# Patient Record
Sex: MALE | Race: BLACK OR AFRICAN AMERICAN | Employment: UNEMPLOYED | URBAN - METROPOLITAN AREA
[De-identification: names, ages, dates, MRNs, and addresses within clinical notes are randomized per-mention and may not be internally consistent; named-entity substitution may affect disease eponyms.]

---

## 2021-04-20 ENCOUNTER — TRANSCRIBE ORDER (OUTPATIENT)
Dept: SCHEDULING | Age: 56
End: 2021-04-20

## 2021-04-20 DIAGNOSIS — M21.379 FOOT DROP: Primary | ICD-10-CM

## 2021-05-06 ENCOUNTER — HOSPITAL ENCOUNTER (OUTPATIENT)
Dept: CT IMAGING | Age: 56
Discharge: HOME | End: 2021-05-06
Payer: MEDICARE

## 2021-05-06 DIAGNOSIS — M21.379 FOOT DROP: ICD-10-CM

## 2021-05-06 PROCEDURE — 72131 CT LUMBAR SPINE W/O DYE: CPT

## 2021-07-12 ENCOUNTER — TRANSCRIBE ORDER (OUTPATIENT)
Dept: SCHEDULING | Age: 56
End: 2021-07-12

## 2021-07-12 DIAGNOSIS — M21.372 LEFT FOOT DROP: Primary | ICD-10-CM

## 2021-07-28 ENCOUNTER — HOSPITAL ENCOUNTER (OUTPATIENT)
Dept: MRI IMAGING | Age: 56
Discharge: HOME | End: 2021-07-28
Attending: PHYSICAL MEDICINE & REHABILITATION
Payer: MEDICARE

## 2021-07-28 DIAGNOSIS — M21.372 LEFT FOOT DROP: ICD-10-CM

## 2021-07-28 PROCEDURE — 72148 MRI LUMBAR SPINE W/O DYE: CPT

## 2022-04-20 ENCOUNTER — TRANSCRIBE ORDER (OUTPATIENT)
Dept: SCHEDULING | Age: 57
End: 2022-04-20

## 2022-04-20 DIAGNOSIS — N18.30 CHRONIC KIDNEY DISEASE, STAGE III (MODERATE) (HCC): Primary | ICD-10-CM

## 2022-04-27 ENCOUNTER — HOSPITAL ENCOUNTER (OUTPATIENT)
Dept: ULTRASOUND IMAGING | Age: 57
Discharge: HOME | End: 2022-04-27
Payer: MEDICARE

## 2022-04-27 DIAGNOSIS — N18.30 CHRONIC KIDNEY DISEASE, STAGE III (MODERATE) (HCC): ICD-10-CM

## 2022-04-27 PROCEDURE — 76770 US EXAM ABDO BACK WALL COMP: CPT

## 2024-05-28 ENCOUNTER — HOSPITAL ENCOUNTER (OUTPATIENT)
Facility: HOSPITAL | Age: 59
Discharge: HOME | End: 2024-05-30
Payer: MEDICARE

## 2024-05-28 DIAGNOSIS — R40.20 COMA, UNSPECIFIED COMA DEPTH (HCC): ICD-10-CM

## 2024-05-28 LAB
VAS LEFT CCA DIST EDV: 14.1 CM/S
VAS LEFT CCA DIST PSV: 57.8 CM/S
VAS LEFT CCA PROX EDV: 23.3 CM/S
VAS LEFT CCA PROX PSV: 106.2 CM/S
VAS LEFT ECA EDV: 7.2 CM/S
VAS LEFT ECA PSV: 89.3 CM/S
VAS LEFT ICA DIST EDV: 36.1 CM/S
VAS LEFT ICA DIST PSV: 97.8 CM/S
VAS LEFT ICA MID EDV: 29 CM/S
VAS LEFT ICA MID PSV: 73.8 CM/S
VAS LEFT ICA PROX EDV: 21.7 CM/S
VAS LEFT ICA PROX PSV: 69 CM/S
VAS LEFT ICA/CCA PSV: 1.7 NO UNITS
VAS LEFT VERTEBRAL EDV: 8.7 CM/S
VAS LEFT VERTEBRAL PSV: 42.8 CM/S
VAS RIGHT CCA DIST EDV: 15.2 CM/S
VAS RIGHT CCA DIST PSV: 68.9 CM/S
VAS RIGHT CCA PROX EDV: 12.7 CM/S
VAS RIGHT CCA PROX PSV: 78.3 CM/S
VAS RIGHT ECA EDV: 0 CM/S
VAS RIGHT ECA PSV: 85.6 CM/S
VAS RIGHT ICA DIST EDV: 34.3 CM/S
VAS RIGHT ICA DIST PSV: 101.4 CM/S
VAS RIGHT ICA MID EDV: 32.5 CM/S
VAS RIGHT ICA MID PSV: 90.5 CM/S
VAS RIGHT ICA PROX EDV: 23.5 CM/S
VAS RIGHT ICA PROX PSV: 89.3 CM/S
VAS RIGHT ICA/CCA PSV: 1.5 NO UNITS
VAS RIGHT VERTEBRAL EDV: 19.8 CM/S
VAS RIGHT VERTEBRAL PSV: 65.1 CM/S

## 2024-05-28 PROCEDURE — 93880 EXTRACRANIAL BILAT STUDY: CPT

## 2024-05-28 PROCEDURE — 93880 EXTRACRANIAL BILAT STUDY: CPT | Performed by: INTERNAL MEDICINE

## 2024-06-25 ENCOUNTER — HOSPITAL ENCOUNTER (INPATIENT)
Facility: HOSPITAL | Age: 59
LOS: 9 days | Discharge: NURSING, REHAB OR LONG-TERM CARE | End: 2024-07-05
Attending: EMERGENCY MEDICINE | Admitting: STUDENT IN AN ORGANIZED HEALTH CARE EDUCATION/TRAINING PROGRAM
Payer: MEDICARE

## 2024-06-25 DIAGNOSIS — R26.89 IMBALANCE: ICD-10-CM

## 2024-06-25 DIAGNOSIS — N18.6 ESRD (END STAGE RENAL DISEASE) (HCC): ICD-10-CM

## 2024-06-25 DIAGNOSIS — R29.898 WEAKNESS OF BOTH LOWER EXTREMITIES: ICD-10-CM

## 2024-06-25 DIAGNOSIS — I63.9 CEREBROVASCULAR ACCIDENT (CVA), UNSPECIFIED MECHANISM (HCC): Primary | ICD-10-CM

## 2024-06-25 DIAGNOSIS — G62.9 PERIPHERAL POLYNEUROPATHY: ICD-10-CM

## 2024-06-25 LAB
ALBUMIN SERPL-MCNC: 2.8 G/DL (ref 3.5–5)
ALBUMIN/GLOB SERPL: 0.7 (ref 1.1–2.2)
ALP SERPL-CCNC: 81 U/L (ref 45–117)
ALT SERPL-CCNC: 11 U/L (ref 12–78)
ANION GAP SERPL CALC-SCNC: 5 MMOL/L (ref 5–15)
AST SERPL-CCNC: 13 U/L (ref 15–37)
BASOPHILS # BLD: 0 K/UL (ref 0–0.1)
BASOPHILS NFR BLD: 1 % (ref 0–1)
BILIRUB SERPL-MCNC: 0.8 MG/DL (ref 0.2–1)
BUN SERPL-MCNC: 57 MG/DL (ref 6–20)
BUN/CREAT SERPL: 6 (ref 12–20)
CALCIUM SERPL-MCNC: 8.8 MG/DL (ref 8.5–10.1)
CHLORIDE SERPL-SCNC: 107 MMOL/L (ref 97–108)
CO2 SERPL-SCNC: 25 MMOL/L (ref 21–32)
CREAT SERPL-MCNC: 9.26 MG/DL (ref 0.7–1.3)
DIFFERENTIAL METHOD BLD: ABNORMAL
EOSINOPHIL # BLD: 0.2 K/UL (ref 0–0.4)
EOSINOPHIL NFR BLD: 2 % (ref 0–7)
ERYTHROCYTE [DISTWIDTH] IN BLOOD BY AUTOMATED COUNT: 12.6 % (ref 11.5–14.5)
GLOBULIN SER CALC-MCNC: 4 G/DL (ref 2–4)
GLUCOSE SERPL-MCNC: 154 MG/DL (ref 65–100)
HCT VFR BLD AUTO: 30.3 % (ref 36.6–50.3)
HGB BLD-MCNC: 10 G/DL (ref 12.1–17)
IMM GRANULOCYTES # BLD AUTO: 0 K/UL (ref 0–0.04)
IMM GRANULOCYTES NFR BLD AUTO: 0 % (ref 0–0.5)
LYMPHOCYTES # BLD: 1.5 K/UL (ref 0.8–3.5)
LYMPHOCYTES NFR BLD: 21 % (ref 12–49)
MCH RBC QN AUTO: 28.5 PG (ref 26–34)
MCHC RBC AUTO-ENTMCNC: 33 G/DL (ref 30–36.5)
MCV RBC AUTO: 86.3 FL (ref 80–99)
MONOCYTES # BLD: 0.5 K/UL (ref 0–1)
MONOCYTES NFR BLD: 7 % (ref 5–13)
NEUTS SEG # BLD: 4.9 K/UL (ref 1.8–8)
NEUTS SEG NFR BLD: 69 % (ref 32–75)
NRBC # BLD: 0 K/UL (ref 0–0.01)
NRBC BLD-RTO: 0 PER 100 WBC
PLATELET # BLD AUTO: 257 K/UL (ref 150–400)
PMV BLD AUTO: 9.9 FL (ref 8.9–12.9)
POTASSIUM SERPL-SCNC: 4.1 MMOL/L (ref 3.5–5.1)
PROT SERPL-MCNC: 6.8 G/DL (ref 6.4–8.2)
RBC # BLD AUTO: 3.51 M/UL (ref 4.1–5.7)
SODIUM SERPL-SCNC: 137 MMOL/L (ref 136–145)
WBC # BLD AUTO: 7.1 K/UL (ref 4.1–11.1)

## 2024-06-25 PROCEDURE — 99285 EMERGENCY DEPT VISIT HI MDM: CPT

## 2024-06-25 PROCEDURE — 93005 ELECTROCARDIOGRAM TRACING: CPT | Performed by: STUDENT IN AN ORGANIZED HEALTH CARE EDUCATION/TRAINING PROGRAM

## 2024-06-25 PROCEDURE — 80053 COMPREHEN METABOLIC PANEL: CPT

## 2024-06-25 PROCEDURE — 36415 COLL VENOUS BLD VENIPUNCTURE: CPT

## 2024-06-25 PROCEDURE — 85025 COMPLETE CBC W/AUTO DIFF WBC: CPT

## 2024-06-25 ASSESSMENT — LIFESTYLE VARIABLES
HOW OFTEN DO YOU HAVE A DRINK CONTAINING ALCOHOL: NEVER
HOW MANY STANDARD DRINKS CONTAINING ALCOHOL DO YOU HAVE ON A TYPICAL DAY: PATIENT DOES NOT DRINK

## 2024-06-26 ENCOUNTER — APPOINTMENT (OUTPATIENT)
Facility: HOSPITAL | Age: 59
End: 2024-06-26
Payer: MEDICARE

## 2024-06-26 PROBLEM — N18.9 CKD (CHRONIC KIDNEY DISEASE): Status: ACTIVE | Noted: 2024-06-26

## 2024-06-26 LAB
CK SERPL-CCNC: 96 U/L (ref 39–308)
EKG ATRIAL RATE: 80 BPM
EKG DIAGNOSIS: NORMAL
EKG P AXIS: 68 DEGREES
EKG P-R INTERVAL: 148 MS
EKG Q-T INTERVAL: 380 MS
EKG QRS DURATION: 86 MS
EKG QTC CALCULATION (BAZETT): 438 MS
EKG R AXIS: -15 DEGREES
EKG T AXIS: 85 DEGREES
EKG VENTRICULAR RATE: 80 BPM
GLUCOSE BLD STRIP.AUTO-MCNC: 152 MG/DL (ref 65–117)
GLUCOSE BLD STRIP.AUTO-MCNC: 160 MG/DL (ref 65–117)
GLUCOSE BLD STRIP.AUTO-MCNC: 182 MG/DL (ref 65–117)
MAGNESIUM SERPL-MCNC: 2.2 MG/DL (ref 1.6–2.4)
SERVICE CMNT-IMP: ABNORMAL
URATE SERPL-MCNC: 7.9 MG/DL (ref 3.5–7.2)

## 2024-06-26 PROCEDURE — 6360000002 HC RX W HCPCS: Performed by: STUDENT IN AN ORGANIZED HEALTH CARE EDUCATION/TRAINING PROGRAM

## 2024-06-26 PROCEDURE — 82962 GLUCOSE BLOOD TEST: CPT

## 2024-06-26 PROCEDURE — 76770 US EXAM ABDO BACK WALL COMP: CPT

## 2024-06-26 PROCEDURE — 97530 THERAPEUTIC ACTIVITIES: CPT

## 2024-06-26 PROCEDURE — 83735 ASSAY OF MAGNESIUM: CPT

## 2024-06-26 PROCEDURE — 84550 ASSAY OF BLOOD/URIC ACID: CPT

## 2024-06-26 PROCEDURE — 6360000002 HC RX W HCPCS: Performed by: INTERNAL MEDICINE

## 2024-06-26 PROCEDURE — 2580000003 HC RX 258: Performed by: STUDENT IN AN ORGANIZED HEALTH CARE EDUCATION/TRAINING PROGRAM

## 2024-06-26 PROCEDURE — 1100000003 HC PRIVATE W/ TELEMETRY

## 2024-06-26 PROCEDURE — 36415 COLL VENOUS BLD VENIPUNCTURE: CPT

## 2024-06-26 PROCEDURE — 97161 PT EVAL LOW COMPLEX 20 MIN: CPT

## 2024-06-26 PROCEDURE — 82550 ASSAY OF CK (CPK): CPT

## 2024-06-26 PROCEDURE — 2580000003 HC RX 258: Performed by: INTERNAL MEDICINE

## 2024-06-26 RX ORDER — SODIUM CHLORIDE 0.9 % (FLUSH) 0.9 %
5-40 SYRINGE (ML) INJECTION PRN
Status: DISCONTINUED | OUTPATIENT
Start: 2024-06-26 | End: 2024-07-05 | Stop reason: HOSPADM

## 2024-06-26 RX ORDER — GLUCAGON 1 MG/ML
1 KIT INJECTION PRN
Status: DISCONTINUED | OUTPATIENT
Start: 2024-06-26 | End: 2024-07-05 | Stop reason: HOSPADM

## 2024-06-26 RX ORDER — LOSARTAN POTASSIUM AND HYDROCHLOROTHIAZIDE 25; 100 MG/1; MG/1
TABLET ORAL
Status: ON HOLD | COMMUNITY
Start: 2023-07-10 | End: 2024-07-05 | Stop reason: HOSPADM

## 2024-06-26 RX ORDER — ATORVASTATIN CALCIUM 10 MG/1
1 TABLET, FILM COATED ORAL DAILY
COMMUNITY
End: 2024-06-26 | Stop reason: ALTCHOICE

## 2024-06-26 RX ORDER — HYDRALAZINE HYDROCHLORIDE 20 MG/ML
10 INJECTION INTRAMUSCULAR; INTRAVENOUS EVERY 6 HOURS PRN
Status: DISCONTINUED | OUTPATIENT
Start: 2024-06-26 | End: 2024-06-28

## 2024-06-26 RX ORDER — DEXTROSE MONOHYDRATE 100 MG/ML
INJECTION, SOLUTION INTRAVENOUS CONTINUOUS PRN
Status: DISCONTINUED | OUTPATIENT
Start: 2024-06-26 | End: 2024-07-05 | Stop reason: HOSPADM

## 2024-06-26 RX ORDER — TRAZODONE HYDROCHLORIDE 100 MG/1
TABLET ORAL
COMMUNITY
Start: 2024-06-13 | End: 2024-06-26 | Stop reason: ALTCHOICE

## 2024-06-26 RX ORDER — SODIUM CHLORIDE 9 MG/ML
INJECTION, SOLUTION INTRAVENOUS CONTINUOUS
Status: DISCONTINUED | OUTPATIENT
Start: 2024-06-26 | End: 2024-06-27

## 2024-06-26 RX ORDER — GLIMEPIRIDE 2 MG/1
TABLET ORAL
COMMUNITY
Start: 2022-09-19 | End: 2024-06-26 | Stop reason: ALTCHOICE

## 2024-06-26 RX ORDER — SODIUM CHLORIDE 9 MG/ML
INJECTION, SOLUTION INTRAVENOUS PRN
Status: DISCONTINUED | OUTPATIENT
Start: 2024-06-26 | End: 2024-07-05 | Stop reason: HOSPADM

## 2024-06-26 RX ORDER — ACETAMINOPHEN 325 MG/1
650 TABLET ORAL EVERY 6 HOURS PRN
Status: DISCONTINUED | OUTPATIENT
Start: 2024-06-26 | End: 2024-07-05 | Stop reason: HOSPADM

## 2024-06-26 RX ORDER — HEPARIN SODIUM 5000 [USP'U]/ML
5000 INJECTION, SOLUTION INTRAVENOUS; SUBCUTANEOUS EVERY 8 HOURS SCHEDULED
Status: DISCONTINUED | OUTPATIENT
Start: 2024-06-26 | End: 2024-06-28

## 2024-06-26 RX ORDER — SODIUM CHLORIDE 0.9 % (FLUSH) 0.9 %
5-40 SYRINGE (ML) INJECTION EVERY 12 HOURS SCHEDULED
Status: DISCONTINUED | OUTPATIENT
Start: 2024-06-26 | End: 2024-07-05 | Stop reason: HOSPADM

## 2024-06-26 RX ORDER — INSULIN LISPRO 100 [IU]/ML
0-4 INJECTION, SOLUTION INTRAVENOUS; SUBCUTANEOUS
Status: DISCONTINUED | OUTPATIENT
Start: 2024-06-26 | End: 2024-07-05 | Stop reason: HOSPADM

## 2024-06-26 RX ORDER — INSULIN LISPRO 100 [IU]/ML
0-4 INJECTION, SOLUTION INTRAVENOUS; SUBCUTANEOUS NIGHTLY
Status: DISCONTINUED | OUTPATIENT
Start: 2024-06-26 | End: 2024-07-05 | Stop reason: HOSPADM

## 2024-06-26 RX ORDER — ONDANSETRON 2 MG/ML
4 INJECTION INTRAMUSCULAR; INTRAVENOUS EVERY 6 HOURS PRN
Status: DISCONTINUED | OUTPATIENT
Start: 2024-06-26 | End: 2024-06-28

## 2024-06-26 RX ORDER — AMLODIPINE BESYLATE 10 MG/1
TABLET ORAL
COMMUNITY
Start: 2023-05-01 | End: 2024-06-26 | Stop reason: ALTCHOICE

## 2024-06-26 RX ORDER — INSULIN GLARGINE 100 [IU]/ML
INJECTION, SOLUTION SUBCUTANEOUS NIGHTLY
Status: ON HOLD | COMMUNITY
Start: 2021-11-08 | End: 2024-07-05 | Stop reason: HOSPADM

## 2024-06-26 RX ORDER — ACETAMINOPHEN 650 MG/1
650 SUPPOSITORY RECTAL EVERY 6 HOURS PRN
Status: DISCONTINUED | OUTPATIENT
Start: 2024-06-26 | End: 2024-07-05 | Stop reason: HOSPADM

## 2024-06-26 RX ADMIN — SODIUM CHLORIDE, PRESERVATIVE FREE 10 ML: 5 INJECTION INTRAVENOUS at 10:07

## 2024-06-26 RX ADMIN — HYDRALAZINE HYDROCHLORIDE 10 MG: 20 INJECTION INTRAMUSCULAR; INTRAVENOUS at 10:06

## 2024-06-26 RX ADMIN — HEPARIN SODIUM 5000 UNITS: 5000 INJECTION INTRAVENOUS; SUBCUTANEOUS at 07:09

## 2024-06-26 RX ADMIN — HEPARIN SODIUM 5000 UNITS: 5000 INJECTION INTRAVENOUS; SUBCUTANEOUS at 14:44

## 2024-06-26 RX ADMIN — SODIUM CHLORIDE: 9 INJECTION, SOLUTION INTRAVENOUS at 14:44

## 2024-06-26 RX ADMIN — HEPARIN SODIUM 5000 UNITS: 5000 INJECTION INTRAVENOUS; SUBCUTANEOUS at 22:20

## 2024-06-26 NOTE — ED NOTES
Bed alarm placed,  patient on monitor x 3, and placed in a gown.   
  TempSrc:    Oral   SpO2:  98% 97% 98%   Weight: 83 kg (182 lb 15.7 oz)      Height: 1.88 m (6' 2\")        Deterioration Index (DI): Deterioration Index: 19.49  Deterioration Index (DI) Interventions Performed:    O2 Flow Rate:    O2 Device: O2 Device: None (Room air)  Cardiac Rhythm:    Critical Lab Results: [unfilled]  Cultures: Cultures:  NIH Score: NIH     Active LDA's:   Peripheral IV 06/25/24 Left Antecubital (Active)     Active Central Lines:                          Active Wounds:    Active Esparza's:    Active Feeding Tubes:      Administered Medications:   Medications   sodium chloride flush 0.9 % injection 5-40 mL (has no administration in time range)   sodium chloride flush 0.9 % injection 5-40 mL (has no administration in time range)   0.9 % sodium chloride infusion (has no administration in time range)   heparin (porcine) injection 5,000 Units (has no administration in time range)   acetaminophen (TYLENOL) tablet 650 mg (has no administration in time range)     Or   acetaminophen (TYLENOL) suppository 650 mg (has no administration in time range)     Last documented pain medication administration: none  Pertinent or High Risk Medications/Drips: no   If Yes, please provide details:   Blood Product Administration: no  If Yes, please provide details:   Process Protocols/Bundles:     Recommendation  Incomplete STAT orders: None  Overdue Medications: none  Patient Belongings:    Additional Comments:   If any further questions, please call Sending RN at [phone]      Admitting Unit Notification  Name of person notified and time: VALERIA Jackson [id number]      Electronically signed by: Electronically signed by Humberto Bryant RN on 6/26/2024 at 6:44 AM

## 2024-06-26 NOTE — PROGRESS NOTES
Occupational Therapy   06.26.2024    Orders acknowledged and chart reviewed in prep for OT evaluation. Patient BRENTON. Will defer and follow up as able and medically appropriate. Thank you.     Tamia Dangelo MS, OTR/L

## 2024-06-26 NOTE — PROGRESS NOTES
Pt had a large emesis this morning.  Pt states that this happens almost daily.  Does not feel nauseated at this time, had a bowel movement this morning and no abdominal pain or distention noted.

## 2024-06-26 NOTE — PROGRESS NOTES
Admission Medication History Technician Note:    Hemodialysis patient: None    Patient preferred pharmacy Confirmed:    [id number] - [place], [place] - [address]  - P [phone] - F [phone]  [address]  [facility] [address]  Phone: [phone] Fax: [phone]      Information obtained from¹: Patient and Rx Query    Does patient manage their medications: yes    Comments/Recommendations: Updated PTA meds/reviewed patient's allergies.    1)  Medication issues identified: per patient not on or taking Trazodone, however it was filled on 6/13/24 for 90 day supply.    2)  Medication changes (since last review):  Added      Removed  - Atorvastatin  - Amlodipine  - Glimepiride  - Trazodone    Adjusted  Lantus: Old: 25 units nightly -->New: 40-45 units nightly      3)  Pertinent Pharmacy Findings:  Identified High Alert Medication Information  None     ¹RxQuery pharmacy benefit data reflects medications filled and processed through the patient's insurance, however this data does NOT capture whether the medication was picked up or is currently being taken by the patient.    Allergies:  Patient has no known allergies.    Chief Complaint for this Admission:    Chief Complaint   Patient presents with    Extremity Weakness     Pt arrives via EMS from home with cc of extremity and generalized weakness. Pt had a stroke 2 months ago. Pt ambulated to bed from stretcher with assistance and gait was unsteady. Pt also had two falls today with no injuries, LOC.      Prior to Admission Medications:   Current Outpatient Medications   Medication Instructions    insulin glargine (LANTUS SOLOSTAR) 100 UNIT/ML injection pen Inject into the skin nightly 40-45 units    losartan-hydroCHLOROthiazide (HYZAAR) 100-25 MG per tablet 1 Tablet 1 Tablet Once A Day       Thank you,  Carmen Puga  Medication History Pharmacy Technician

## 2024-06-26 NOTE — ED PROVIDER NOTES
[facility] EMERGENCY DEPT  EMERGENCY DEPARTMENT ENCOUNTER         Pt Name: Gerald Yuen  MRN: [id number]  Birthdate 1965  Date of evaluation: 6/25/2024  Provider: Michelle Marsh MD   PCP: Bro Lynch APRN - NP  Note Started: 12:38 AM 6/26/24     CHIEF COMPLAINT       Chief Complaint   Patient presents with   • Extremity Weakness     Pt arrives via EMS from home with cc of extremity and generalized weakness. Pt had a stroke 2 months ago. Pt ambulated to bed from stretcher with assistance and gait was unsteady. Pt also had two falls today with no injuries, LOC.         HISTORY OF PRESENT ILLNESS: 1 or more elements      History From: Patient, EMS, and Patient's Daughter  HPI Limitations: None     Gerald Yuen is a 58 y.o. male whose medical history is listed below and includes insulin-dependent diabetes, neuropathy, prior CVA, presents to the emergency department with weakness in his legs which has been ongoing for the past few days.  He has no not able to go up and down the stairs due to the weakness.  Today had 2 falls because his legs could no longer hold him up, says that he was able to stand up after the first fall on his own, the second time required some assistance.  During the falls he felt on his benign, did not hit his head or lose consciousness.  Denies any injuries or pain from the falls.  No recent fever, chills or other illness.  He says that he is not had a good appetite and very poor p.o. intake over the last few days.  Also says that he has had similar episodes like this previously and is not sure what caused them.      Please see more comprehensive history below under MDM  Nursing Notes were all reviewed in real time as they are made available. Any disagreements addressed in the HPI/MDM.     REVIEW OF SYSTEMS      Review of Systems   Constitutional:  Positive for fatigue. Negative for chills and fever.   HENT:  Negative for facial swelling and trouble swallowing.    Eyes:  Negative for

## 2024-06-26 NOTE — H&P
Hospitalist Admission Note    NAME: Gerald Yuen   :  1965   MRN:  [id number]     Date/Time:  2024 8:49 AM    Patient PCP: Bro Lynch APRN - NP  ______________________________________________________________________  Given the patient's current clinical presentation, I have a high level of concern for decompensation if discharged from the emergency department.  Given the patient's current clinical presentation, I have a high level of concern for decompensation if patient is discharged from the emergency department.  Patient will be admitted as an inpatient with an estimated LOS of 2 days    My assessment of this patient's clinical condition and my plan of care is as follows.    Assessment / Plan:    SHAHRAM on CKD, unspecified  -He was told in the past that his kidneys were weak but no recent baseline creatinine.  Suspect progression of underlying diabetic nephropathy or hypertensive nephrosclerosis  -Hold losartan/HCTZ  -Check renal ultrasound  -Consult nephrology  -Serial labs    HTN  -Holding losartan HCTZ as above  -IV hydralazine as needed    DM2  -Check A1c  -Hold Lantus due to SHAHRAM  -Start with correctional sliding scale insulin    Generalized weakness, s/p fall x 2  -Denies head trauma.  -PT OT eval and treat    Lumbosacral spondylosis  Lumbar spinal stenosis  -Denies significant back pain or radiation of pain down his legs.  Monitor    Medical Decision Making:  Labs reviewed by myself: CBC, BMP  Diagnostic data reviewed by myself:  CXR  Toxic drug monitoring:  Discussed case with emergency room physician . After discussion I am in agreement that acuity of patient's medical condition necessitates hospital stay.        Code Status: Full  Surrogate Decision Maker: Children are next of kin.  Parents still alive    DVT Prophylaxis: Heparin subcu    Baseline: .  Uses a cane.  Lives with a brother in with his parents.    Subjective:   CHIEF COMPLAINT: Worsening weakness    HISTORY  turgor normal  Skin:     Not pale.  Not Jaundiced  No rashes   Psych:  Good insight.  Not anxious or agitated.  Neurologic: EOMs intact. No facial asymmetry. No aphasia or slurred speech. Symmetrical strength, Sensation grossly intact. Alert and oriented X 4    LAB DATA REVIEWED:    Recent Results (from the past 12 hour(s))   EKG 12 Lead    Collection Time: 06/25/24 10:58 PM   Result Value Ref Range    Ventricular Rate 80 BPM    Atrial Rate 80 BPM    P-R Interval 148 ms    QRS Duration 86 ms    Q-T Interval 380 ms    QTc Calculation (Bazett) 438 ms    P Axis 68 degrees    R Axis -15 degrees    T Axis 85 degrees    Diagnosis       Normal sinus rhythm  Cannot rule out Inferior infarct , age undetermined  Possible Anterior infarct , age undetermined  No previous ECGs available     CBC with Auto Differential    Collection Time: 06/25/24 11:16 PM   Result Value Ref Range    WBC 7.1 4.1 - 11.1 K/uL    RBC 3.51 (L) 4.10 - 5.70 M/uL    Hemoglobin 10.0 (L) 12.1 - 17.0 g/dL    Hematocrit 30.3 (L) 36.6 - 50.3 %    MCV 86.3 80.0 - 99.0 FL    MCH 28.5 26.0 - 34.0 PG    MCHC 33.0 30.0 - 36.5 g/dL    RDW 12.6 11.5 - 14.5 %    Platelets 257 150 - 400 K/uL    MPV 9.9 8.9 - 12.9 FL    Nucleated RBCs 0.0 0  WBC    nRBC 0.00 0.00 - 0.01 K/uL    Neutrophils % 69 32 - 75 %    Lymphocytes % 21 12 - 49 %    Monocytes % 7 5 - 13 %    Eosinophils % 2 0 - 7 %    Basophils % 1 0 - 1 %    Immature Granulocytes % 0 0.0 - 0.5 %    Neutrophils Absolute 4.9 1.8 - 8.0 K/UL    Lymphocytes Absolute 1.5 0.8 - 3.5 K/UL    Monocytes Absolute 0.5 0.0 - 1.0 K/UL    Eosinophils Absolute 0.2 0.0 - 0.4 K/UL    Basophils Absolute 0.0 0.0 - 0.1 K/UL    Immature Granulocytes Absolute 0.0 0.00 - 0.04 K/UL    Differential Type AUTOMATED     Comprehensive Metabolic Panel    Collection Time: 06/25/24 11:16 PM   Result Value Ref Range    Sodium 137 136 - 145 mmol/L    Potassium 4.1 3.5 - 5.1 mmol/L    Chloride 107 97 - 108 mmol/L    CO2 25 21 - 32 mmol/L

## 2024-06-26 NOTE — CONSULTS
06/26/24        I have been asked to see this patient by Mp Coleman MD  for advice/opinion re: -----                                                        Assessment:          SHAHRAM on CKD  CKD stage IV  Diabetic nephropathy  Generalized weakness  Diabetic neuropathy  Hypertension associate with CKD   Discussion:     Renal ultrasound report reviewed and kidney size is not small  Will evaluate for proteinuria  Agree with holding losartan hydrochlorothiazide  D/w Hospitalist.    Plan:   Rehydrate with IV fluids and monitor urine output  If no improvement of kidney function, would need initiation of dialysis.                   Thanks for consulting me. Renal service will follow patient with you.Please don't hesitate to contact me if any questions arise of if I can assist in any manner.      Mary Lenz MD  Cell no- [phone]  Available on perfect serve.          Signed By: Mary Lenz MD     June 26, 2024           Consult Date: 6/26/2024    Inpatient consult to Nephrology  Consult performed by: Mary Lenz MD  Consult ordered by: Mp Coleman MD            Subjective   HISTORY OF PRESENTING ILLNESS :  Gerald Yuen is a 58 y.o.,male ,Black /  with    longstanding history of of diabetes with complications including diabetic neuropathy and diabetic nephropathy, chronic kidney disease, hypertension who presents to the hospital with generalized weakness and inability to ambulate.  Labs in the ED confirmed severe kidney disease.  He states he has not been able to void for the last 2 days.     he states that he has not been taking any NSAIDs.  He said in the past he saw a kidney doctor with [facility]-does not remember the specific doctor's name    PMH:  Past Medical History:   Diagnosis Date    Cerebral artery  RN)          US RETROPERITONEAL COMPLETE    (Results Pending)        Patient Active Problem List   Diagnosis    CKD (chronic kidney disease)      Full Code     Care Plan discussed with:  Patient x    Family  x    RN     Dialysis RN     Consultant:     ED     Intensivist     Hospitalist         Comments   >50% of visit spent in counseling and coordination of care         This dictation was done by dragon, computer voice recognition software.  Often unanticipated grammatical, syntax, phones and other interpretive errors are inadvertently transcribed.  Please excuse errors that have escaped final proofreading. Please contact me if you suspect dictation or transcription errors.      Signed By: Mary Lenz MD     June 26, 2024       Dr Mary Lenz    Office No- [phone]  [place] [facility]  [address]  [address]  [place] [address]    Fax: [phone]

## 2024-06-26 NOTE — PLAN OF CARE
Problem: Safety - Adult  Goal: Free from fall injury  Outcome: Progressing     Problem: Chronic Conditions and Co-morbidities  Goal: Patient's chronic conditions and co-morbidity symptoms are monitored and maintained or improved  Outcome: Progressing     Problem: Physical Therapy - Adult  Goal: By Discharge: Performs mobility at highest level of function for planned discharge setting.  See evaluation for individualized goals.  Description: FUNCTIONAL STATUS PRIOR TO ADMISSION: Patient was independent and active without use of DME, though reports inactivity 2/2 weakness/low energy/decreased appetite over the last month.     HOME SUPPORT PRIOR TO ADMISSION: The patient lived with parents and brother but did not require assistance. Pt reports Dad requires assistance from Mom, brother had multiple surgeries and doesn't move very well.     Physical Therapy Goals  Initiated 6/26/2024  1.  Patient will move from supine to sit and sit to supine, scoot up and down, and roll side to side in bed with independence within 7 day(s).    2.  Patient will perform sit to stand with modified independence within 7 day(s).  3.  Patient will transfer from bed to chair and chair to bed with modified independence using the least restrictive device within 7 day(s).  4.  Patient will ambulate with modified independence for 50 feet with the least restrictive device within 7 day(s).   6/26/2024 1320 by Spring Brooks, PT  Outcome: Progressing     Problem: Skin/Tissue Integrity  Goal: Absence of new skin breakdown  Description: 1.  Monitor for areas of redness and/or skin breakdown  2.  Assess vascular access sites hourly  3.  Every 4-6 hours minimum:  Change oxygen saturation probe site  4.  Every 4-6 hours:  If on nasal continuous positive airway pressure, respiratory therapy assess nares and determine need for appliance change or resting period.  Outcome: Progressing

## 2024-06-26 NOTE — ED TRIAGE NOTES
Pt arrived with a cc of lower extremity weakness and increased fatigue over the last 3 days. Pt also complained of dizziness especially with ambulation. Pt had an unsteady gait when moving from EMS stretcher to bed. Pt ambulates with cane.

## 2024-06-26 NOTE — PLAN OF CARE
Problem: Physical Therapy - Adult  Goal: By Discharge: Performs mobility at highest level of function for planned discharge setting.  See evaluation for individualized goals.  Description: FUNCTIONAL STATUS PRIOR TO ADMISSION: Patient was independent and active without use of DME, though reports inactivity 2/2 weakness/low energy/decreased appetite over the last month.     HOME SUPPORT PRIOR TO ADMISSION: The patient lived with parents and brother but did not require assistance. Pt reports Dad requires assistance from Mom, brother had multiple surgeries and doesn't move very well.     Physical Therapy Goals  Initiated 6/26/2024  1.  Patient will move from supine to sit and sit to supine, scoot up and down, and roll side to side in bed with independence within 7 day(s).    2.  Patient will perform sit to stand with modified independence within 7 day(s).  3.  Patient will transfer from bed to chair and chair to bed with modified independence using the least restrictive device within 7 day(s).  4.  Patient will ambulate with modified independence for 50 feet with the least restrictive device within 7 day(s).   Outcome: Progressing      PHYSICAL THERAPY EVALUATION    Patient: Gerald Yuen (58 y.o. male)  Date: 6/26/2024  Primary Diagnosis: CKD (chronic kidney disease) [N18.9]       Precautions: Restrictions/Precautions: Bed Alarm, Fall Risk                      ASSESSMENT :   Pt seen for PT evaluation following admission for generalized weakness and falls, SHAHRAM. Pt encountered semi-reclined in bed, agreeable to participate in therapy. Pt demos good strength throughout BUE/BLE, transferred to sit at EOB with S. Pt performed stand step transfer to bedside chair, uncontrolled descent when sitting in chair. Pt reported lightheadedness, found with symptomatic orthostatic hypotension. BLE elevated and pt reclined in bedside chair, remained orthostatic. Chair > bed stand step transfer with CGA, posterior LOB with

## 2024-06-27 ENCOUNTER — APPOINTMENT (OUTPATIENT)
Facility: HOSPITAL | Age: 59
End: 2024-06-27
Payer: MEDICARE

## 2024-06-27 LAB
25(OH)D3 SERPL-MCNC: 13.6 NG/ML (ref 30–100)
ALBUMIN SERPL-MCNC: 2.9 G/DL (ref 3.5–5)
ALBUMIN/GLOB SERPL: 0.7 (ref 1.1–2.2)
ALP SERPL-CCNC: 78 U/L (ref 45–117)
ALT SERPL-CCNC: 14 U/L (ref 12–78)
ANION GAP SERPL CALC-SCNC: 5 MMOL/L (ref 5–15)
AST SERPL-CCNC: 12 U/L (ref 15–37)
BASOPHILS # BLD: 0 K/UL (ref 0–0.1)
BASOPHILS NFR BLD: 1 % (ref 0–1)
BILIRUB SERPL-MCNC: 0.9 MG/DL (ref 0.2–1)
BUN SERPL-MCNC: 59 MG/DL (ref 6–20)
BUN/CREAT SERPL: 7 (ref 12–20)
CALCIUM SERPL-MCNC: 9 MG/DL (ref 8.5–10.1)
CHLORIDE SERPL-SCNC: 111 MMOL/L (ref 97–108)
CO2 SERPL-SCNC: 24 MMOL/L (ref 21–32)
COMMENT:: NORMAL
CREAT SERPL-MCNC: 8.9 MG/DL (ref 0.7–1.3)
CREAT UR-MCNC: 189 MG/DL
DIFFERENTIAL METHOD BLD: ABNORMAL
EOSINOPHIL # BLD: 0.1 K/UL (ref 0–0.4)
EOSINOPHIL NFR BLD: 3 % (ref 0–7)
ERYTHROCYTE [DISTWIDTH] IN BLOOD BY AUTOMATED COUNT: 12.6 % (ref 11.5–14.5)
EST. AVERAGE GLUCOSE BLD GHB EST-MCNC: 151 MG/DL
FOLATE SERPL-MCNC: 10.2 NG/ML (ref 5–21)
GLOBULIN SER CALC-MCNC: 4 G/DL (ref 2–4)
GLUCOSE BLD STRIP.AUTO-MCNC: 133 MG/DL (ref 65–117)
GLUCOSE BLD STRIP.AUTO-MCNC: 146 MG/DL (ref 65–117)
GLUCOSE BLD STRIP.AUTO-MCNC: 181 MG/DL (ref 65–117)
GLUCOSE BLD STRIP.AUTO-MCNC: 181 MG/DL (ref 65–117)
GLUCOSE SERPL-MCNC: 143 MG/DL (ref 65–100)
HBA1C MFR BLD: 6.9 % (ref 4–5.6)
HCT VFR BLD AUTO: 31 % (ref 36.6–50.3)
HGB BLD-MCNC: 9.8 G/DL (ref 12.1–17)
IMM GRANULOCYTES # BLD AUTO: 0 K/UL (ref 0–0.04)
IMM GRANULOCYTES NFR BLD AUTO: 0 % (ref 0–0.5)
LYMPHOCYTES # BLD: 1.5 K/UL (ref 0.8–3.5)
LYMPHOCYTES NFR BLD: 30 % (ref 12–49)
MCH RBC QN AUTO: 28.2 PG (ref 26–34)
MCHC RBC AUTO-ENTMCNC: 31.6 G/DL (ref 30–36.5)
MCV RBC AUTO: 89.3 FL (ref 80–99)
MONOCYTES # BLD: 0.3 K/UL (ref 0–1)
MONOCYTES NFR BLD: 7 % (ref 5–13)
NEUTS SEG # BLD: 3.1 K/UL (ref 1.8–8)
NEUTS SEG NFR BLD: 59 % (ref 32–75)
NRBC # BLD: 0 K/UL (ref 0–0.01)
NRBC BLD-RTO: 0 PER 100 WBC
PHOSPHATE SERPL-MCNC: 2.7 MG/DL (ref 2.6–4.7)
PLATELET # BLD AUTO: 229 K/UL (ref 150–400)
PMV BLD AUTO: 9.6 FL (ref 8.9–12.9)
POTASSIUM SERPL-SCNC: 4.3 MMOL/L (ref 3.5–5.1)
PROT SERPL-MCNC: 6.9 G/DL (ref 6.4–8.2)
PROT UR-MCNC: 632 MG/DL (ref 0–11.9)
PROT/CREAT UR-RTO: 3.3
RBC # BLD AUTO: 3.47 M/UL (ref 4.1–5.7)
SERVICE CMNT-IMP: ABNORMAL
SODIUM SERPL-SCNC: 140 MMOL/L (ref 136–145)
SPECIMEN HOLD: NORMAL
VIT B12 SERPL-MCNC: 1535 PG/ML (ref 193–986)
WBC # BLD AUTO: 5.1 K/UL (ref 4.1–11.1)

## 2024-06-27 PROCEDURE — 85025 COMPLETE CBC W/AUTO DIFF WBC: CPT

## 2024-06-27 PROCEDURE — 82784 ASSAY IGA/IGD/IGG/IGM EACH: CPT

## 2024-06-27 PROCEDURE — 97116 GAIT TRAINING THERAPY: CPT

## 2024-06-27 PROCEDURE — 97530 THERAPEUTIC ACTIVITIES: CPT | Performed by: OCCUPATIONAL THERAPIST

## 2024-06-27 PROCEDURE — 97535 SELF CARE MNGMENT TRAINING: CPT | Performed by: OCCUPATIONAL THERAPIST

## 2024-06-27 PROCEDURE — 82962 GLUCOSE BLOOD TEST: CPT

## 2024-06-27 PROCEDURE — 36415 COLL VENOUS BLD VENIPUNCTURE: CPT

## 2024-06-27 PROCEDURE — 82607 VITAMIN B-12: CPT

## 2024-06-27 PROCEDURE — 70551 MRI BRAIN STEM W/O DYE: CPT

## 2024-06-27 PROCEDURE — 99222 1ST HOSP IP/OBS MODERATE 55: CPT | Performed by: INTERNAL MEDICINE

## 2024-06-27 PROCEDURE — 84156 ASSAY OF PROTEIN URINE: CPT

## 2024-06-27 PROCEDURE — 72148 MRI LUMBAR SPINE W/O DYE: CPT

## 2024-06-27 PROCEDURE — 80053 COMPREHEN METABOLIC PANEL: CPT

## 2024-06-27 PROCEDURE — 82306 VITAMIN D 25 HYDROXY: CPT

## 2024-06-27 PROCEDURE — 84165 PROTEIN E-PHORESIS SERUM: CPT

## 2024-06-27 PROCEDURE — 83036 HEMOGLOBIN GLYCOSYLATED A1C: CPT

## 2024-06-27 PROCEDURE — 84100 ASSAY OF PHOSPHORUS: CPT

## 2024-06-27 PROCEDURE — 97165 OT EVAL LOW COMPLEX 30 MIN: CPT | Performed by: OCCUPATIONAL THERAPIST

## 2024-06-27 PROCEDURE — 6360000002 HC RX W HCPCS: Performed by: INTERNAL MEDICINE

## 2024-06-27 PROCEDURE — 82746 ASSAY OF FOLIC ACID SERUM: CPT

## 2024-06-27 PROCEDURE — 1100000003 HC PRIVATE W/ TELEMETRY

## 2024-06-27 PROCEDURE — 97530 THERAPEUTIC ACTIVITIES: CPT

## 2024-06-27 PROCEDURE — 6360000002 HC RX W HCPCS: Performed by: STUDENT IN AN ORGANIZED HEALTH CARE EDUCATION/TRAINING PROGRAM

## 2024-06-27 PROCEDURE — 2580000003 HC RX 258: Performed by: INTERNAL MEDICINE

## 2024-06-27 PROCEDURE — 82570 ASSAY OF URINE CREATININE: CPT

## 2024-06-27 PROCEDURE — 86334 IMMUNOFIX E-PHORESIS SERUM: CPT

## 2024-06-27 RX ADMIN — HEPARIN SODIUM 5000 UNITS: 5000 INJECTION INTRAVENOUS; SUBCUTANEOUS at 08:31

## 2024-06-27 RX ADMIN — HYDRALAZINE HYDROCHLORIDE 10 MG: 20 INJECTION INTRAMUSCULAR; INTRAVENOUS at 21:59

## 2024-06-27 RX ADMIN — SODIUM CHLORIDE: 9 INJECTION, SOLUTION INTRAVENOUS at 15:11

## 2024-06-27 RX ADMIN — SODIUM CHLORIDE: 9 INJECTION, SOLUTION INTRAVENOUS at 02:36

## 2024-06-27 RX ADMIN — HEPARIN SODIUM 5000 UNITS: 5000 INJECTION INTRAVENOUS; SUBCUTANEOUS at 16:36

## 2024-06-27 RX ADMIN — HYDRALAZINE HYDROCHLORIDE 10 MG: 20 INJECTION INTRAMUSCULAR; INTRAVENOUS at 06:07

## 2024-06-27 NOTE — PLAN OF CARE
Problem: Safety - Adult  Goal: Free from fall injury  6/27/2024 0015 by Sarai Cox LPN  Outcome: Progressing  6/26/2024 1835 by Blanca Heck RN  Outcome: Progressing     Problem: Chronic Conditions and Co-morbidities  Goal: Patient's chronic conditions and co-morbidity symptoms are monitored and maintained or improved  6/27/2024 0015 by Sarai Cox LPN  Outcome: Progressing  6/26/2024 1835 by Blanca Heck RN  Outcome: Progressing     Problem: Physical Therapy - Adult  Goal: By Discharge: Performs mobility at highest level of function for planned discharge setting.  See evaluation for individualized goals.  Description: FUNCTIONAL STATUS PRIOR TO ADMISSION: Patient was independent and active without use of DME, though reports inactivity 2/2 weakness/low energy/decreased appetite over the last month.     HOME SUPPORT PRIOR TO ADMISSION: The patient lived with parents and brother but did not require assistance. Pt reports Dad requires assistance from Mom, brother had multiple surgeries and doesn't move very well.     Physical Therapy Goals  Initiated 6/26/2024  1.  Patient will move from supine to sit and sit to supine, scoot up and down, and roll side to side in bed with independence within 7 day(s).    2.  Patient will perform sit to stand with modified independence within 7 day(s).  3.  Patient will transfer from bed to chair and chair to bed with modified independence using the least restrictive device within 7 day(s).  4.  Patient will ambulate with modified independence for 50 feet with the least restrictive device within 7 day(s).   6/26/2024 1320 by Spring Brooks, BRIGIDA  Outcome: Progressing     Problem: Skin/Tissue Integrity  Goal: Absence of new skin breakdown  Description: 1.  Monitor for areas of redness and/or skin breakdown  2.  Assess vascular access sites hourly  3.  Every 4-6 hours minimum:  Change oxygen saturation probe site  4.  Every 4-6 hours:  If on nasal continuous positive  airway pressure, respiratory therapy assess nares and determine need for appliance change or resting period.  6/27/2024 0015 by Sarai Cox LPN  Outcome: Progressing  6/26/2024 1835 by Blanca Heck RN  Outcome: Progressing

## 2024-06-27 NOTE — CARE COORDINATION
Care Management Initial Assessment       RUR: 14% low risk  Readmission? No  1st IM letter given? Yes   1st  letter given: No    Met with pt at bedside to discuss discharge recommendation of IPR and to verify demographics. Confirmed information on face sheet, with exception of PCP; pt stated Janakfrederickezequiel Syed left practice and he believes the name of his new PCP is Lorena Iniguez (CM unable to confirm a Lorena Iniguez at a local practice, will follow up) and that his last visit was a few weeks ago. Per pt, he lives with parents and a brother, is independent for ADLs, uses a cane PRN, dad drives him to his appointments. Remote hx of either SNF or IPR when pt lived in [place], he is unsure level of care or name of facility. Discussed IPR recommendation, pt verbalized understanding and agreement with recommendation, CM advised that insurance and bed availability could be limiting factors for choice, pt verbalized understanding, agreeable to CM sending multiple referrals to check for insurance coverage and bed availability for pt to review available options when known. Referrals sent.     06/27/24 5815   Service Assessment   Patient Orientation Alert and Oriented   Cognition Alert   History Provided By Patient   Primary Caregiver Self   Support Systems Parent;Family Members   Patient's Healthcare Decision Maker is: Legal Next of Kin   PCP Verified by CM No  (pt stated PCP left practice, stated he thinks Lorena Iniguez is new PCP, CM unable to locate this name, will follow up)   Last Visit to PCP Within last 3 months   Prior Functional Level Independent in ADLs/IADLs   Current Functional Level Assistance with the following:;Bathing;Cooking;Housework;Shopping;Mobility   Can patient return to prior living arrangement Other (see comment)  (after IPR)   Ability to make needs known: Good   Family able to assist with home care needs: Other (comment)  (limited IADL assist, family unable to provide physical assistance)

## 2024-06-27 NOTE — PROGRESS NOTES
06/27/24        I have been asked to see this patient by Stefany Kim MD  for advice/opinion re: -----                                                        Assessment:          SHAHRAM on CKD  CKD stage IV  Diabetic nephropathy  Generalized weakness  Diabetic neuropathy  Hypertension associate with CKD   Discussion:     Renal ultrasound report reviewed and kidney size is not small  UPCR 3.3 g.  Clinical picture is suggestive of diabetic nephropathy in which kidney size is on the larger side.  Clinically he either has CKD stage V or an episode of SHAHRAM on CKD  Spoke to him about dialysis and need for dialysis.  He is agreeable to start dialysis  Agree with holding losartan hydrochlorothiazide  Appreciate neurology input  Plan:   N.p.o. from midnight  Discontinue IV fluids  Tunneled hemodialysis catheter tomorrow and first treatment of dialysis tomorrow  Second treatment of dialysis on Saturday  Kidney biopsy next week for definitive diagnosis of CKD stage V   to find OP dialysis centers.  Request HARINI so we can follow-up on him                 Thanks for consulting me. Renal service will follow patient with you.Please don't hesitate to contact me if any questions arise of if I can assist in any manner.      Mary Lenz MD  Cell no- [phone]  Available on perfect serve.          Signed By: Mary Lenz MD     June 27, 2024           Consult Date: 6/27/2024        Subjective   Seen and examined.  No new complaints.  Out of bed in chair.  Receiving IV fluids    PMH:  Past Medical History:   Diagnosis Date    Cerebral artery occlusion with cerebral infarction (HCC)     Chronic kidney disease     Diabetes mellitus (HCC)      PSH:  History reviewed. No pertinent surgical history.    Social history:        Family history:  No history of CKD or ESRD in the

## 2024-06-27 NOTE — CONSULTS
Date of Consultation:  June 27, 2024    Referring Physician: MD Judy     Reason for Consultation:  weakness     Chief Complaint   Patient presents with    Extremity Weakness     Pt arrives via EMS from home with cc of extremity and generalized weakness. Pt had a stroke 2 months ago. Pt ambulated to bed from stretcher with assistance and gait was unsteady. Pt also had two falls today with no injuries, LOC.        History of Present Illness:   Gerald Yuen is a 58 y.o. male with history of CKD, chronic left foot drop in the setting of severe lumbar stenosis and lumbar radiculopathy, and type 2 diabetes with diabetic peripheral neuropathy who presents with 2-week history of worsening gait, imbalance, weakness in the lower extremities.    Patient reports that for the last 6 months to a year he has noticed that the bottom of his feet are numb.  He has been told that he has diabetic neuropathy.  He does not have a neurologist.  He also reports that he has had episodes in the last 6 months where he feels that he has to stop walking and hold onto something due to feeling that his legs are going to give out or that he will fall.  He mostly is concerned with his balance, as he describes significant unsteadiness with walking.    He does have chronic back issues but denies any back pain.  He has chronic left foot drop.  He had an MRI of the lumbar spine in 2021 which showed severe lumbar radiculopathy and canal stenosis. He has not had any surgery.     He denies any other respiratory issues, facial weakness, difficulty chewing, swallowing or speaking, head drop/vision changes that are acute, ptosis. Denies any bladder or bowel incontinence. Has had several falls in the last 2 weeks.     Past Medical History:   Diagnosis Date    Cerebral artery occlusion with cerebral infarction (HCC)     Chronic kidney disease     Diabetes mellitus (HCC)         History reviewed. No pertinent surgical history.     History reviewed. No  light touch and pp   Facial movements symmetric; no facial droop  Hearing intact to soft rub bilaterally   Shoulder shrug symmetric and strong   Tongue protrusion full and midline     Motor:   Normal tone    Drift: No evidence of pronator drift   Finger tapping equal and symmetric      Strength testing:   deltoid triceps biceps Wrist ext. Wrist flex. intrinsics   Right 5 5 5 5 5 5   Left 5 5 5 5 5 5      Hip flex. Hip ext. Knee ext.  Knee flex Dorsi flex Plantar flex   Right  5 NT 5 5 5 5   Left  4 NT 5 5 3 chronic 5       Sensory:  Intact to light touch throughout, diminished to pp distal to the mid shins bilaterally.   Absent to vibration L toe, intact at the knee.   Diminished to vibration in the R toe, intact in the knee.   Intact to vibration in the UE   Absent proprioception in the feet, intact in the hands.     Reflexes:   Biceps Triceps  Brachiorad Patellar Achilles Plantar Hoffmans   Right  2 2 2 - - Down NT   Left  2 2 2 - - Down NT     Cerebellar testing:  No dysmetria. Normal rapid alternating movements; finger-to-nose and heel-to- shin testing are within normal limits.    Data:     Lab Results   Component Value Date/Time     06/27/2024 05:05 AM    K 4.3 06/27/2024 05:05 AM     06/27/2024 05:05 AM    BUN 59 06/27/2024 05:05 AM    WBC 5.1 06/27/2024 05:05 AM    HCT 31.0 06/27/2024 05:05 AM    HGB 9.8 06/27/2024 05:05 AM     06/27/2024 05:05 AM       Imaging:  Reviewed independently.    IMPRESSION/RECOMMENDATIONS:  Gerald Yuen is a 58 y.o. male who presents with subjective bilateral lower extremity weakness and balance issues more pronounced in the last 2 weeks, with chronic left foot drop, history of lumbar spinal stenosis and foraminal stenosis, diabetic induced neuropathy in the lower extremities.  His exam is remarkable for a length-dependent peripheral neuropathy as well as a chronic left foot drop and mild left psoas weakness, which I suspect is chronic given his MRI of the

## 2024-06-27 NOTE — PROGRESS NOTES
Comprehensive Nutrition Assessment    Type and Reason for Visit:  Initial, Positive Nutrition Screen    Nutrition Recommendations/Plan:   Adjust to 5 carb choice diet; discontinue potassium/phos restrictions   Add Glucerna BID     Malnutrition Assessment:  Malnutrition Status:  Insufficient data (06/27/24 1538)      Nutrition Assessment:    Patient medically noted for SHAHRAM on CKD, HTN, DM, and generalized weakness. Chart reviewed due to MST referral. Patient sitting up in the chair at time of visit. RN at bedside in the process of obtaining labs; NFPE deferred. He reports a 30# weight loss in 2-3 weeks with a UBW closer to 217#. No weight history to review per EMR. He states his appetite has been nonexistent. He has not tried supplements before. Feels appetite is starting to improve since admission. Will adjust to CCD; potassium and phos are normal so will discontinue restrictions in diet. Agreeable to trial of Glucerna shakes. Menu at bedside. Encouraged intake of meals as tolerated.     Nutrition Related Findings:    K+ 4.3, Phos 2.7, -[phone], A1c 6.9   BM 6/26   Humalog   Wound Type: None       Current Nutrition Intake & Therapies:          ADULT DIET; Regular; Low Potassium (Less than 3000 mg/day); Low Phosphorus (Less than 1000 mg)    Anthropometric Measures:  Height: 188 cm (6' 2\")  Ideal Body Weight (IBW): 190 lbs (86 kg)       Current Body Weight: 83 kg (182 lb 15.7 oz),   IBW.    Current BMI (kg/m2): 23.5                          BMI Categories: Normal Weight (BMI 18.5-24.9)    Estimated Daily Nutrient Needs:  Energy Requirements Based On: Formula  Weight Used for Energy Requirements: Current  Energy (kcal/day): 2249 kcals (BMR x 1.3AF)  Weight Used for Protein Requirements: Current  Protein (g/day): 83-100g (1.0-1.2 g/kg bw)  Method Used for Fluid Requirements: 1 ml/kcal  Fluid (ml/day): 2250 mL    Nutrition Diagnosis:   Inadequate protein-energy intake related to  (decreased appetite) as

## 2024-06-27 NOTE — PROGRESS NOTES
End of Shift Note    Bedside shift change report given to RN (oncoming nurse) by Sarai Cox LPN (offgoing nurse).  Report included the following information SBAR, Kardex, ED Summary, OR Summary, Intake/Output, MAR, and Accordion    Shift worked:  7p-7a     Shift summary and any significant changes:     Pt hypertensive   Given hydralazine for high BP  Pt bedrest for orthostatic eposide w/ PT  Pt voiding , UA sent off  No pain      Concerns for physician to address:  Blood pressure      Zone phone for oncoming shift:   [phone]     Activity:     Number times ambulated in hallways past shift: 0  Number of times OOB to chair past shift: 1    Cardiac:   Cardiac Monitoring: Yes           Access:  Current line(s): PIV     Genitourinary:   Urinary status: voiding    Respiratory:      Chronic home O2 use?: NO  Incentive spirometer at bedside: YES       GI:     Current diet:  ADULT DIET; Regular; Low Potassium (Less than 3000 mg/day); Low Phosphorus (Less than 1000 mg)  Passing flatus: YES  Tolerating current diet: YES       Pain Management:   Patient states pain is manageable on current regimen: YES    Skin:     Interventions: PT/OT consult, limit briefs, and internal/external urinary devices    Patient Safety:  Fall Score:    Interventions: bed/chair alarm, assistive device (walker, cane. etc), gripper socks, pt to call before getting OOB, and stay with me (per policy)       Length of Stay:  Expected LOS: 3  Actual LOS: 1      Sarai Cox LPN

## 2024-06-27 NOTE — PLAN OF CARE
Problem: Physical Therapy - Adult  Goal: By Discharge: Performs mobility at highest level of function for planned discharge setting.  See evaluation for individualized goals.  Description: FUNCTIONAL STATUS PRIOR TO ADMISSION: Patient was independent and active without use of DME, though reports inactivity 2/2 weakness/low energy/decreased appetite over the last month.     HOME SUPPORT PRIOR TO ADMISSION: The patient lived with parents and brother but did not require assistance. Pt reports Dad requires assistance from Mom, brother had multiple surgeries and doesn't move very well.     Physical Therapy Goals  Initiated 6/26/2024  1.  Patient will move from supine to sit and sit to supine, scoot up and down, and roll side to side in bed with independence within 7 day(s).    2.  Patient will perform sit to stand with modified independence within 7 day(s).  3.  Patient will transfer from bed to chair and chair to bed with modified independence using the least restrictive device within 7 day(s).  4.  Patient will ambulate with modified independence for 50 feet with the least restrictive device within 7 day(s).   Outcome: Progressing   PHYSICAL THERAPY TREATMENT    Patient: Gerald Yuen (58 y.o. male)  Date: 6/27/2024  Diagnosis: CKD (chronic kidney disease) [N18.9] CKD (chronic kidney disease)      Precautions: Bed Alarm, Fall Risk                      ASSESSMENT:  Patient continues to benefit from skilled PT services and is slowly progressing towards goals. Pt received for PT session supine in bed, agreeable to therapy. He performed bed mobility with rail and Supervision. Sit <> stand EOB with CGA-Min A and RW. Pt BP stable in all positions. Noted B knees flexing with fatigue in standing, per pt he vipul quickly and has burning in his legs (worst in feet). Endorses 6+ falls in last month. Returned to sitting, MMT performed with no overt weakness but L sided foot drop (per pt is chronic). Pt returned to

## 2024-06-27 NOTE — PLAN OF CARE
Problem: Safety - Adult  Goal: Free from fall injury  Outcome: Progressing  Flowsheets (Taken 6/27/2024 0830)  Free From Fall Injury: Instruct family/caregiver on patient safety     Problem: Chronic Conditions and Co-morbidities  Goal: Patient's chronic conditions and co-morbidity symptoms are monitored and maintained or improved  Outcome: Progressing     Problem: Skin/Tissue Integrity  Goal: Absence of new skin breakdown  Description: 1.  Monitor for areas of redness and/or skin breakdown  2.  Assess vascular access sites hourly  3.  Every 4-6 hours minimum:  Change oxygen saturation probe site  4.  Every 4-6 hours:  If on nasal continuous positive airway pressure, respiratory therapy assess nares and determine need for appliance change or resting period.  Outcome: Progressing     Problem: Occupational Therapy - Adult  Goal: By Discharge: Performs self-care activities at highest level of function for planned discharge setting.  See evaluation for individualized goals.  Description: FUNCTIONAL STATUS PRIOR TO ADMISSION:  Patient reports being independent with ADLs, IADLs and ambulation at baseline, but has declined over the past 3 months. He has had 6 falls in the past month due to BLE weakness.     HOME SUPPORT: Patient was living with his brother and elderly parents.     Occupational Therapy Goals:  Initiated 6/27/2024  1.  Patient will perform grooming standing at sink with Stand by Assist for 5 minutes within 7 day(s).  2.  Patient will perform lower body dressing with Stand by Assist within 7 day(s).  3.  Patient will perform toilet transfers with Stand by Assist  within 7 day(s).  4.  Patient will perform all aspects of toileting with Stand by Assist within 7 day(s).  5.  Patient will sponge bathing with Stand by Assist within 7 day(s).     6/27/2024 1510 by Perez Ingram, OTR/L  Outcome: Progressing     Problem: Nutrition Deficit:  Goal: Optimize nutritional status  Outcome: Progressing

## 2024-06-27 NOTE — PLAN OF CARE
Problem: Occupational Therapy - Adult  Goal: By Discharge: Performs self-care activities at highest level of function for planned discharge setting.  See evaluation for individualized goals.  Description: FUNCTIONAL STATUS PRIOR TO ADMISSION:  Patient reports being independent with ADLs, IADLs and ambulation at baseline, but has declined over the past 3 months. He has had 6 falls in the past month due to BLE weakness.     HOME SUPPORT: Patient was living with his brother and elderly parents.     Occupational Therapy Goals:  Initiated 6/27/2024  1.  Patient will perform grooming standing at sink with Stand by Assist for 5 minutes within 7 day(s).  2.  Patient will perform lower body dressing with Stand by Assist within 7 day(s).  3.  Patient will perform toilet transfers with Stand by Assist  within 7 day(s).  4.  Patient will perform all aspects of toileting with Stand by Assist within 7 day(s).  5.  Patient will sponge bathing with Stand by Assist within 7 day(s).     Outcome: Progressing    OCCUPATIONAL THERAPY EVALUATION    Patient: Gerald Yuen (58 y.o. male)  Date: 6/27/2024  Primary Diagnosis: CKD (chronic kidney disease) [N18.9]         Precautions: Bed Alarm, Fall Risk                  ASSESSMENT :  The patient is currently limited by BLE weakness, decreased activity tolerance, decreased safety awareness and decreased standing balance which is impairing his  functional independence. He was fully independent with ADLs, IADLs and ambulation 3 months ago, but has been experiencing BLE weakness, burning/tingling sensation in B feet and ankles and has had a decline in his standing balance over the past 3 months, having had 6 falls in the past month. He is now functioning below his independent baseline, performing ADLs at an independent to min A level and is supervision to min A for functional mobility. At this time the patient will continue to benefit from acute OT and depending on his progress acutely he  Exceptions  Safety Judgement: Decreased awareness of need for assistance    Hearing:   Hearing  Hearing: Within functional limits    Vision/Perceptual:    Vision - Basic Assessment  Prior Vision: Wears glasses all the time  Visual History: Diabetic retinopathy (receives shots in L eye.)        Perception  Overall Perceptual Status: WFL    Range of Motion:   AROM: Within functional limits         Strength:  Strength: Generally decreased, functional (generally decreased, grossly 4/5 in BLEs , withe the exception of having baseline L foot drop. BUE s are WFL,5/5 t/o)      Coordination:  Coordination: Within functional limits            Tone & Sensation:   Tone: Normal  Sensation: Impaired (burning and tingling in feet with weight bearing.)          Functional Mobility and Transfers for ADLs:    Bed Mobility:     Bed Mobility Training  Overall Level of Assistance: Supervision  Supine to Sit: Supervision  Scooting: Supervision    Transfers:     Transfer Training  Transfer Training: Yes  Sit to Stand: Contact-guard assistance  Stand to Sit: Minimum assistance  Bed to Chair: Minimum assistance (ambulating with a RW)  Ambulation:  CGA to min A ambulating with a RW  Balance:     Balance  Sitting: Intact  Standing: Impaired  Standing - Static: Constant support;Good  Standing - Dynamic: Constant support;Fair        ADL Assessment:     Feeding: Independent       Grooming: Contact guard assistance       UE Bathing: Supervision;Setup  UE Bathing Skilled Clinical Factors: if seated.    LE Bathing: Minimal assistance       UE Dressing: Supervision;Setup       LE Dressing: Minimal assistance       Toileting: Minimal assistance         Pain Rating:  Burning pain in ankles and feet with weight bearing.   Pain Intervention(s):   rest    Activity Tolerance:   Fair   BP stable with positional changes.   After treatment:   Patient left in no apparent distress sitting up in chair, Call bell within reach, and Bed/ chair alarm

## 2024-06-27 NOTE — PROGRESS NOTES
Hospitalist Progress Note    NAME:   Gerald Yuen   : 1965   MRN: [id number]     Date/Time: 2024 9:12 AM  Patient PCP: Bro Lynch APRN - NP    Estimated discharge date:  Barriers: Nephrology clearance, neurology clearance      Assessment / Plan:    SHAHRAM on CKD, unspecified  -He was told in the past that his kidneys were weak but no recent baseline creatinine.  Suspect progression of underlying diabetic nephropathy or hypertensive nephrosclerosis  -Hold losartan/HCTZ  - renal ultrasound showed no acute abnormality  -Consult nephrology  -Serial labs     HTN  -Holding losartan HCTZ as above  -IV hydralazine as needed     DM2    -Continue correctional sliding scale insulin     Generalized weakness, s/p fall x 2  -Denies head trauma.  -PT OT eval and treat     Lumbosacral spondylosis  Lumbar spinal stenosis  Weakness bilateral lower extremities    Neurology consultation  Follow-up brain MRI  Follow-up lumbar spine MRI          Medical Decision Making:   I personally reviewed labs: CBC, BMP  I personally reviewed imaging:  I personally reviewed EKG: Yes  Toxic drug monitoring:   Discussed case with: Nurse, IDR        Code Status: Full  DVT Prophylaxis: Heparin  GI Prophylaxis:    Subjective:     Chief Complaint / Reason for Physician Visit  \"\".  Discussed with RN events overnight.       Objective:     VITALS:   Last 24hrs VS reviewed since prior progress note. Most recent are:  Patient Vitals for the past 24 hrs:   BP Temp Temp src Pulse Resp SpO2   24 0830 (!) 146/74 97.7 °F (36.5 °C) Oral 87 16 95 %   24 0600 (!) 186/90 -- -- -- -- --   24 0345 136/77 -- -- -- -- --   24 0337 (!) 191/99 -- -- 85 17 100 %   24 2307 (!) 163/85 98.4 °F (36.9 °C) -- 85 17 98 %   24 2035 (!) 168/87 98.4 °F (36.9 °C) Oral 81 16 99 %   24 1444 (!) 163/82 98.2 °F (36.8 °C) -- 88 16 100 %   24 1142 122/67 -- -- 82 -- --   24 1139 111/64 -- -- 82 -- --

## 2024-06-28 ENCOUNTER — APPOINTMENT (OUTPATIENT)
Facility: HOSPITAL | Age: 59
End: 2024-06-28
Payer: MEDICARE

## 2024-06-28 ENCOUNTER — APPOINTMENT (OUTPATIENT)
Facility: HOSPITAL | Age: 59
End: 2024-06-28
Attending: INTERNAL MEDICINE
Payer: MEDICARE

## 2024-06-28 LAB
ALBUMIN SERPL-MCNC: 2.5 G/DL (ref 3.5–5)
ALBUMIN/GLOB SERPL: 0.7 (ref 1.1–2.2)
ALP SERPL-CCNC: 70 U/L (ref 45–117)
ALT SERPL-CCNC: 14 U/L (ref 12–78)
ANION GAP SERPL CALC-SCNC: 6 MMOL/L (ref 5–15)
AST SERPL-CCNC: 13 U/L (ref 15–37)
BASOPHILS # BLD: 0 K/UL (ref 0–0.1)
BASOPHILS NFR BLD: 1 % (ref 0–1)
BILIRUB SERPL-MCNC: 0.6 MG/DL (ref 0.2–1)
BUN SERPL-MCNC: 63 MG/DL (ref 6–20)
BUN/CREAT SERPL: 7 (ref 12–20)
CALCIUM SERPL-MCNC: 8 MG/DL (ref 8.5–10.1)
CHLORIDE SERPL-SCNC: 109 MMOL/L (ref 97–108)
CO2 SERPL-SCNC: 24 MMOL/L (ref 21–32)
CREAT SERPL-MCNC: 8.77 MG/DL (ref 0.7–1.3)
DIFFERENTIAL METHOD BLD: ABNORMAL
ECHO AO ROOT DIAM: 2.7 CM
ECHO AO ROOT INDEX: 1.29 CM/M2
ECHO AV AREA PEAK VELOCITY: 3.7 CM2
ECHO AV AREA PEAK VELOCITY: 3.8 CM2
ECHO AV AREA VTI: 4.1 CM2
ECHO AV AREA/BSA VTI: 2 CM2/M2
ECHO AV CUSP MM: 1.7 CM
ECHO AV MEAN GRADIENT: 3 MMHG
ECHO AV MEAN VELOCITY: 0.7 M/S
ECHO AV PEAK GRADIENT: 5 MMHG
ECHO AV PEAK GRADIENT: 5 MMHG
ECHO AV PEAK VELOCITY: 1.1 M/S
ECHO AV PEAK VELOCITY: 1.1 M/S
ECHO AV VTI: 18.8 CM
ECHO BSA: 2.08 M2
ECHO LA VOL A-L A2C: 52 ML (ref 18–58)
ECHO LA VOL A-L A4C: 39 ML (ref 18–58)
ECHO LA VOL MOD A2C: 48 ML (ref 18–58)
ECHO LA VOL MOD A4C: 37 ML (ref 18–58)
ECHO LA VOLUME AREA LENGTH: 49 ML
ECHO LA VOLUME INDEX A-L A2C: 25 ML/M2 (ref 16–34)
ECHO LA VOLUME INDEX A-L A4C: 19 ML/M2 (ref 16–34)
ECHO LA VOLUME INDEX AREA LENGTH: 23 ML/M2 (ref 16–34)
ECHO LA VOLUME INDEX MOD A2C: 23 ML/M2 (ref 16–34)
ECHO LA VOLUME INDEX MOD A4C: 18 ML/M2 (ref 16–34)
ECHO LV E' LATERAL VELOCITY: 7 CM/S
ECHO LV E' SEPTAL VELOCITY: 6 CM/S
ECHO LVOT AREA: 3.8 CM2
ECHO LVOT AV VTI INDEX: 1.14
ECHO LVOT DIAM: 2.2 CM
ECHO LVOT MEAN GRADIENT: 3 MMHG
ECHO LVOT PEAK GRADIENT: 5 MMHG
ECHO LVOT PEAK GRADIENT: 5 MMHG
ECHO LVOT PEAK VELOCITY: 1.1 M/S
ECHO LVOT PEAK VELOCITY: 1.2 M/S
ECHO LVOT STROKE VOLUME INDEX: 39.1 ML/M2
ECHO LVOT SV: 81.7 ML
ECHO LVOT VTI: 21.5 CM
ECHO MV A VELOCITY: 1.24 M/S
ECHO MV AREA VTI: 4.1 CM2
ECHO MV E DECELERATION TIME (DT): 185.9 MS
ECHO MV E VELOCITY: 0.61 M/S
ECHO MV E/A RATIO: 0.49
ECHO MV E/E' LATERAL: 8.71
ECHO MV E/E' RATIO (AVERAGED): 9.44
ECHO MV E/E' SEPTAL: 10.17
ECHO MV LVOT VTI INDEX: 0.93
ECHO MV MAX VELOCITY: 1.3 M/S
ECHO MV MEAN GRADIENT: 3 MMHG
ECHO MV MEAN VELOCITY: 0.8 M/S
ECHO MV PEAK GRADIENT: 7 MMHG
ECHO MV VTI: 20.1 CM
ECHO PV MAX VELOCITY: 1.1 M/S
ECHO PV PEAK GRADIENT: 5 MMHG
ECHO RV FREE WALL PEAK S': 17 CM/S
ECHO RV INTERNAL DIMENSION: 2.6 CM
ECHO RVOT PEAK GRADIENT: 1 MMHG
ECHO RVOT PEAK VELOCITY: 0.6 M/S
EOSINOPHIL # BLD: 0.2 K/UL (ref 0–0.4)
EOSINOPHIL NFR BLD: 4 % (ref 0–7)
ERYTHROCYTE [DISTWIDTH] IN BLOOD BY AUTOMATED COUNT: 12.8 % (ref 11.5–14.5)
GLOBULIN SER CALC-MCNC: 3.6 G/DL (ref 2–4)
GLUCOSE BLD STRIP.AUTO-MCNC: 163 MG/DL (ref 65–117)
GLUCOSE BLD STRIP.AUTO-MCNC: 163 MG/DL (ref 65–117)
GLUCOSE BLD STRIP.AUTO-MCNC: 174 MG/DL (ref 65–117)
GLUCOSE BLD STRIP.AUTO-MCNC: 221 MG/DL (ref 65–117)
GLUCOSE SERPL-MCNC: 192 MG/DL (ref 65–100)
HBV SURFACE AB SER QL: NONREACTIVE
HBV SURFACE AB SER-ACNC: <3.1 MIU/ML
HBV SURFACE AG SER QL: <0.1 INDEX
HBV SURFACE AG SER QL: NEGATIVE
HCT VFR BLD AUTO: 26.9 % (ref 36.6–50.3)
HGB BLD-MCNC: 8.5 G/DL (ref 12.1–17)
IMM GRANULOCYTES # BLD AUTO: 0 K/UL (ref 0–0.04)
IMM GRANULOCYTES NFR BLD AUTO: 0 % (ref 0–0.5)
LYMPHOCYTES # BLD: 1.6 K/UL (ref 0.8–3.5)
LYMPHOCYTES NFR BLD: 28 % (ref 12–49)
MCH RBC QN AUTO: 28.1 PG (ref 26–34)
MCHC RBC AUTO-ENTMCNC: 31.6 G/DL (ref 30–36.5)
MCV RBC AUTO: 89.1 FL (ref 80–99)
MONOCYTES # BLD: 0.4 K/UL (ref 0–1)
MONOCYTES NFR BLD: 7 % (ref 5–13)
NEUTS SEG # BLD: 3.3 K/UL (ref 1.8–8)
NEUTS SEG NFR BLD: 60 % (ref 32–75)
NRBC # BLD: 0 K/UL (ref 0–0.01)
NRBC BLD-RTO: 0 PER 100 WBC
PLATELET # BLD AUTO: 247 K/UL (ref 150–400)
PMV BLD AUTO: 9.9 FL (ref 8.9–12.9)
POTASSIUM SERPL-SCNC: 4.2 MMOL/L (ref 3.5–5.1)
PROT SERPL-MCNC: 6.1 G/DL (ref 6.4–8.2)
RBC # BLD AUTO: 3.02 M/UL (ref 4.1–5.7)
SERVICE CMNT-IMP: ABNORMAL
SODIUM SERPL-SCNC: 139 MMOL/L (ref 136–145)
WBC # BLD AUTO: 5.5 K/UL (ref 4.1–11.1)

## 2024-06-28 PROCEDURE — 85025 COMPLETE CBC W/AUTO DIFF WBC: CPT

## 2024-06-28 PROCEDURE — 86706 HEP B SURFACE ANTIBODY: CPT

## 2024-06-28 PROCEDURE — 6370000000 HC RX 637 (ALT 250 FOR IP): Performed by: INTERNAL MEDICINE

## 2024-06-28 PROCEDURE — 6360000002 HC RX W HCPCS: Performed by: STUDENT IN AN ORGANIZED HEALTH CARE EDUCATION/TRAINING PROGRAM

## 2024-06-28 PROCEDURE — 90935 HEMODIALYSIS ONE EVALUATION: CPT

## 2024-06-28 PROCEDURE — 36415 COLL VENOUS BLD VENIPUNCTURE: CPT

## 2024-06-28 PROCEDURE — 1100000003 HC PRIVATE W/ TELEMETRY

## 2024-06-28 PROCEDURE — 5A1D70Z PERFORMANCE OF URINARY FILTRATION, INTERMITTENT, LESS THAN 6 HOURS PER DAY: ICD-10-PCS | Performed by: INTERNAL MEDICINE

## 2024-06-28 PROCEDURE — 71045 X-RAY EXAM CHEST 1 VIEW: CPT

## 2024-06-28 PROCEDURE — 70547 MR ANGIOGRAPHY NECK W/O DYE: CPT

## 2024-06-28 PROCEDURE — 82962 GLUCOSE BLOOD TEST: CPT

## 2024-06-28 PROCEDURE — 97116 GAIT TRAINING THERAPY: CPT

## 2024-06-28 PROCEDURE — 99233 SBSQ HOSP IP/OBS HIGH 50: CPT

## 2024-06-28 PROCEDURE — 70544 MR ANGIOGRAPHY HEAD W/O DYE: CPT

## 2024-06-28 PROCEDURE — 84207 ASSAY OF VITAMIN B-6: CPT

## 2024-06-28 PROCEDURE — 6360000002 HC RX W HCPCS: Performed by: INTERNAL MEDICINE

## 2024-06-28 PROCEDURE — 2580000003 HC RX 258: Performed by: STUDENT IN AN ORGANIZED HEALTH CARE EDUCATION/TRAINING PROGRAM

## 2024-06-28 PROCEDURE — 2580000003 HC RX 258: Performed by: INTERNAL MEDICINE

## 2024-06-28 PROCEDURE — 02HV33Z INSERTION OF INFUSION DEVICE INTO SUPERIOR VENA CAVA, PERCUTANEOUS APPROACH: ICD-10-PCS | Performed by: INTERNAL MEDICINE

## 2024-06-28 PROCEDURE — 76937 US GUIDE VASCULAR ACCESS: CPT

## 2024-06-28 PROCEDURE — 2500000003 HC RX 250 WO HCPCS: Performed by: STUDENT IN AN ORGANIZED HEALTH CARE EDUCATION/TRAINING PROGRAM

## 2024-06-28 PROCEDURE — 80053 COMPREHEN METABOLIC PANEL: CPT

## 2024-06-28 PROCEDURE — 87340 HEPATITIS B SURFACE AG IA: CPT

## 2024-06-28 PROCEDURE — 6370000000 HC RX 637 (ALT 250 FOR IP)

## 2024-06-28 PROCEDURE — 93306 TTE W/DOPPLER COMPLETE: CPT

## 2024-06-28 RX ORDER — HYDRALAZINE HYDROCHLORIDE 20 MG/ML
10 INJECTION INTRAMUSCULAR; INTRAVENOUS EVERY 6 HOURS PRN
Status: DISCONTINUED | OUTPATIENT
Start: 2024-06-28 | End: 2024-07-05 | Stop reason: HOSPADM

## 2024-06-28 RX ORDER — ATORVASTATIN CALCIUM 40 MG/1
40 TABLET, FILM COATED ORAL NIGHTLY
Status: DISCONTINUED | OUTPATIENT
Start: 2024-06-28 | End: 2024-07-05 | Stop reason: HOSPADM

## 2024-06-28 RX ORDER — SODIUM CHLORIDE 0.9 % (FLUSH) 0.9 %
5-40 SYRINGE (ML) INJECTION PRN
Status: DISCONTINUED | OUTPATIENT
Start: 2024-06-28 | End: 2024-06-29

## 2024-06-28 RX ORDER — POLYETHYLENE GLYCOL 3350 17 G/17G
17 POWDER, FOR SOLUTION ORAL DAILY PRN
Status: DISCONTINUED | OUTPATIENT
Start: 2024-06-28 | End: 2024-07-05 | Stop reason: HOSPADM

## 2024-06-28 RX ORDER — ONDANSETRON 4 MG/1
4 TABLET, ORALLY DISINTEGRATING ORAL EVERY 8 HOURS PRN
Status: DISCONTINUED | OUTPATIENT
Start: 2024-06-28 | End: 2024-07-05 | Stop reason: HOSPADM

## 2024-06-28 RX ORDER — HEPARIN SODIUM 5000 [USP'U]/ML
5000 INJECTION, SOLUTION INTRAVENOUS; SUBCUTANEOUS EVERY 8 HOURS SCHEDULED
Status: DISCONTINUED | OUTPATIENT
Start: 2024-06-28 | End: 2024-07-05 | Stop reason: HOSPADM

## 2024-06-28 RX ORDER — SODIUM CHLORIDE 9 MG/ML
INJECTION, SOLUTION INTRAVENOUS PRN
Status: DISCONTINUED | OUTPATIENT
Start: 2024-06-28 | End: 2024-06-29

## 2024-06-28 RX ORDER — AMLODIPINE BESYLATE 5 MG/1
10 TABLET ORAL DAILY
Status: DISCONTINUED | OUTPATIENT
Start: 2024-06-28 | End: 2024-07-03

## 2024-06-28 RX ORDER — HEPARIN 100 UNIT/ML
300 SYRINGE INTRAVENOUS ONCE
Status: COMPLETED | OUTPATIENT
Start: 2024-06-28 | End: 2024-06-28

## 2024-06-28 RX ORDER — SODIUM CHLORIDE 0.9 % (FLUSH) 0.9 %
5-40 SYRINGE (ML) INJECTION EVERY 12 HOURS SCHEDULED
Status: DISCONTINUED | OUTPATIENT
Start: 2024-06-28 | End: 2024-06-29

## 2024-06-28 RX ORDER — HEPARIN SODIUM 200 [USP'U]/100ML
200 INJECTION, SOLUTION INTRAVENOUS ONCE
Status: DISCONTINUED | OUTPATIENT
Start: 2024-06-28 | End: 2024-07-05 | Stop reason: HOSPADM

## 2024-06-28 RX ORDER — CLOPIDOGREL BISULFATE 75 MG/1
75 TABLET ORAL DAILY
Status: DISCONTINUED | OUTPATIENT
Start: 2024-06-28 | End: 2024-07-05 | Stop reason: HOSPADM

## 2024-06-28 RX ORDER — ONDANSETRON 2 MG/ML
4 INJECTION INTRAMUSCULAR; INTRAVENOUS EVERY 6 HOURS PRN
Status: DISCONTINUED | OUTPATIENT
Start: 2024-06-28 | End: 2024-07-05 | Stop reason: HOSPADM

## 2024-06-28 RX ORDER — LIDOCAINE HYDROCHLORIDE 20 MG/ML
20 INJECTION, SOLUTION INFILTRATION; PERINEURAL ONCE
Status: COMPLETED | OUTPATIENT
Start: 2024-06-28 | End: 2024-06-28

## 2024-06-28 RX ORDER — VITAMIN B COMPLEX
5000 TABLET ORAL DAILY
Status: DISCONTINUED | OUTPATIENT
Start: 2024-06-28 | End: 2024-07-05 | Stop reason: HOSPADM

## 2024-06-28 RX ORDER — ASPIRIN 81 MG/1
81 TABLET, CHEWABLE ORAL DAILY
Status: DISCONTINUED | OUTPATIENT
Start: 2024-06-28 | End: 2024-07-05 | Stop reason: HOSPADM

## 2024-06-28 RX ADMIN — HEPARIN SODIUM 5000 UNITS: 5000 INJECTION INTRAVENOUS; SUBCUTANEOUS at 02:20

## 2024-06-28 RX ADMIN — HEPARIN SODIUM 40 ML: 200 INJECTION, SOLUTION INTRAVENOUS at 14:48

## 2024-06-28 RX ADMIN — CLOPIDOGREL BISULFATE 75 MG: 75 TABLET ORAL at 09:00

## 2024-06-28 RX ADMIN — ASPIRIN 81 MG: 81 TABLET, CHEWABLE ORAL at 09:00

## 2024-06-28 RX ADMIN — SODIUM CHLORIDE, PRESERVATIVE FREE 10 ML: 5 INJECTION INTRAVENOUS at 08:13

## 2024-06-28 RX ADMIN — Medication 260 UNITS: at 14:47

## 2024-06-28 RX ADMIN — SODIUM CHLORIDE, PRESERVATIVE FREE 10 ML: 5 INJECTION INTRAVENOUS at 21:05

## 2024-06-28 RX ADMIN — HEPARIN SODIUM 5000 UNITS: 5000 INJECTION INTRAVENOUS; SUBCUTANEOUS at 12:08

## 2024-06-28 RX ADMIN — Medication 5000 UNITS: at 12:08

## 2024-06-28 RX ADMIN — HEPARIN SODIUM 5000 UNITS: 5000 INJECTION INTRAVENOUS; SUBCUTANEOUS at 21:03

## 2024-06-28 RX ADMIN — HEPARIN SODIUM 5000 UNITS: 5000 INJECTION INTRAVENOUS; SUBCUTANEOUS at 08:13

## 2024-06-28 RX ADMIN — LIDOCAINE HYDROCHLORIDE 5 ML: 20 INJECTION, SOLUTION INFILTRATION; PERINEURAL at 14:47

## 2024-06-28 RX ADMIN — ATORVASTATIN CALCIUM 40 MG: 40 TABLET, FILM COATED ORAL at 21:00

## 2024-06-28 ASSESSMENT — PAIN - FUNCTIONAL ASSESSMENT: PAIN_FUNCTIONAL_ASSESSMENT: NONE - DENIES PAIN

## 2024-06-28 NOTE — PROGRESS NOTES
Occupational Therapy    Attempted to see patient for OT treatment, but the patient is currently off the floor, with plans for HD cath placement and first HD session today. OT will follow back on Monday at this point to see patient for treatment.     Perez Ingram, OTR/L

## 2024-06-28 NOTE — PLAN OF CARE
Problem: Safety - Adult  Goal: Free from fall injury  6/27/2024 2240 by Sarai Cox LPN  Outcome: Progressing  6/27/2024 1629 by Chloe Stapleton RN  Outcome: Progressing  Flowsheets (Taken 6/27/2024 0830)  Free From Fall Injury: Instruct family/caregiver on patient safety     Problem: Chronic Conditions and Co-morbidities  Goal: Patient's chronic conditions and co-morbidity symptoms are monitored and maintained or improved  6/27/2024 2240 by Sarai Cox LPN  Outcome: Progressing  6/27/2024 1629 by Chloe Stapleton RN  Outcome: Progressing     Problem: Physical Therapy - Adult  Goal: By Discharge: Performs mobility at highest level of function for planned discharge setting.  See evaluation for individualized goals.  Description: FUNCTIONAL STATUS PRIOR TO ADMISSION: Patient was independent and active without use of DME, though reports inactivity 2/2 weakness/low energy/decreased appetite over the last month.     HOME SUPPORT PRIOR TO ADMISSION: The patient lived with parents and brother but did not require assistance. Pt reports Dad requires assistance from Mom, brother had multiple surgeries and doesn't move very well.     Physical Therapy Goals  Initiated 6/26/2024  1.  Patient will move from supine to sit and sit to supine, scoot up and down, and roll side to side in bed with independence within 7 day(s).    2.  Patient will perform sit to stand with modified independence within 7 day(s).  3.  Patient will transfer from bed to chair and chair to bed with modified independence using the least restrictive device within 7 day(s).  4.  Patient will ambulate with modified independence for 50 feet with the least restrictive device within 7 day(s).   6/27/2024 1734 by Pia Ramos, BRIGIDA  Outcome: Progressing     Problem: Skin/Tissue Integrity  Goal: Absence of new skin breakdown  Description: 1.  Monitor for areas of redness and/or skin breakdown  2.  Assess vascular access sites hourly  3.  Every 4-6

## 2024-06-28 NOTE — PROGRESS NOTES
Speech Pathology Note    SLP orders received. Chart reviewed and discussed with RN. Note patient remains NPO for tunneled hemodialysis catheter placement on this date. Note MRI revealed \"Small foci of acute/subacute infarction involving the anterior right body of the corpus callosum/frontal lobe.\" Will defer SLP evaluation and will follow up as patient is cleared for PO intake.     Armen Garcia M.S., CCC-SLP

## 2024-06-28 NOTE — PROGRESS NOTES
06/28/24        I have been asked to see this patient by Maricarmen Weiss MD  for advice/opinion re: -----                                                        Assessment:          SHAHRAM on CKD  CKD stage IV or progressive CKD 5  Diabetic nephropathy  Generalized weakness  Diabetic neuropathy  Hypertension associate with CKD   Discussion:     Initially in the morning, on the phone he refused dialysis.  But after speaking to in person and explained to him again, he change his mind and agrees to dialysis.  He has consumed lunch though.  Renal ultrasound report reviewed and kidney size is not small  UPCR 3.3 g.  Clinical picture is suggestive of diabetic nephropathy in which kidney size is on the larger side.  Clinically he either has CKD stage V or an episode of SHAHRAM on CKD  Spoke to him about dialysis and need for dialysis.  He is agreeable to start dialysis  Agree with holding losartan hydrochlorothiazide  Appreciate neurology input  Plan:   Temporary hemodialysis catheter placement.  First dialysis treatment today  Second dialysis treatment tomorrow  Biopsy next week if feasible to determine SHAHRAM on CKD versus CKD 5  Placement in outpatient dialysis center by case management                 Thanks for consulting me. Renal service will follow patient with you.Please don't hesitate to contact me if any questions arise of if I can assist in any manner.      Mary Lenz MD  Cell no- [phone]  Available on perfect serve.          Signed By: Mary Lenz MD     June 28, 2024           Consult Date: 6/28/2024        Subjective   Seen and examined.  Accompanied by RN.  Was eating lunch.  Asked me about what dialysis entails.  Explained to him the procedure of hemodialysis and the usual schedule of 4-hour treatment 3 times a week.  Answered his questions    PMH:  Past Medical  Collection Time: 06/28/24  4:47 AM   Result Value Ref Range    Hepatitis B Surface Ag <0.10 Index    Hep B S Ag Interp Negative NEG     POCT Glucose    Collection Time: 06/28/24  6:58 AM   Result Value Ref Range    POC Glucose 163 (H) 65 - 117 mg/dL    Performed by: Patrick Dejesus PCT    POCT Glucose    Collection Time: 06/28/24 12:09 PM   Result Value Ref Range    POC Glucose 174 (H) 65 - 117 mg/dL    Performed by: Tomer Ngo          MRA NECK WO CONTRAST   Final Result   No significant stenosis demonstrated.      Electronically signed by ELVIRA WOODS MD      MRA HEAD WO CONTRAST   Final Result   No significant abnormality.      Electronically signed by ELVIRA WOODS MD      MRI LUMBAR SPINE WO CONTRAST   Final Result      1. No acute fracture.   2. Unchanged severe central spinal canal stenosis at L3-L4.   3. New moderate-severe left foraminal stenosis at L4-L5.      Electronically signed by Mian Diaz      MRI BRAIN WO CONTRAST   Final Result   Small foci of acute/subacute infarction involving the anterior right body of the   corpus callosum/frontal lobe. Background of chronic white matter disease and   small chronic infarctions as above. Numerous chronic microhemorrhages may   indicate cerebral amyloid angiopathy.         Electronically signed by Jorge RODRIGUEZ RETROPERITONEAL COMPLETE   Final Result   No hydronephrosis.         Electronically signed by VIDYA FLORES      Vascular duplex carotid bilateral    (Results Pending)   IR NONTUNNELED VASCULAR CATHETER > 5 YEARS    (Results Pending)        Patient Active Problem List   Diagnosis    CKD (chronic kidney disease)      Full Code     Care Plan discussed with:  Patient x    Family  x    RN x    Dialysis RN     Consultant:     ED     Intensivist     Hospitalist         Comments   >50% of visit spent in counseling and coordination of care         This dictation was done by dragon, computer voice recognition software.  Often unanticipated grammatical,

## 2024-06-28 NOTE — PROGRESS NOTES
Patient tolerated PT services well and continues to progress toward PT POC goals. Progress to date less than anticipated 2/2 underlying medical status. Upon arrival, Pt admits increased hunger s/p change from NPO status d/t cancellation of tunnel cath for HD. Pt repeats several times during session goal for more information and MD Team consultation to \"tell me what this is all about before I make a decision\" to enable informed consent. Pt admits likely will do it in a few weeks, but only after learns more about dialysis process and lifelong outlook. Compassionate listening and pt education that undersigned will relay concerns to RN/MD Teams. When able, most tasks performed with cga/Bharat including bed mobility, functional transfers, room ambulation. Slightly unsteady gait and observed chair flop without reach back prior to sit. No LOB. Deferral of additional attempted activity per medical status and breakfast arrival. Per baseline status and ongoing functional mobility deficits, continued recommended dc to an inpatient/IPR setting prior to return home. Pt demo likely ability to benefit from and tolerate more aggressive 3 hour setting toward optimal PLOF return and recovery. Full PT note to follow.        06/28/24 0918 06/28/24 0919 06/28/24 0922   Vital Signs   BP (!) 176/89 129/76 (!) 141/60   MAP (Calculated) 118 94 87   MAP (mmHg) 114 89 83   Patient Position Supine Sitting Standing         **Pt AO x3 (thought it was end of May). Reports ~lightheadedness/tired and admits hungry and awaiting breakfast. No report dizziness. Chair flop at end of session**    Isabel Ramos, PT, DPT

## 2024-06-28 NOTE — PROGRESS NOTES
Jodi Mitchell notified via perfect serve that I was just notified by CT that contrast with ordered CTA cannot be given due to current creatinine levels and pt current refusing or diaylsis to be done. Call back [phone] given.

## 2024-06-28 NOTE — PROGRESS NOTES
HOSPITAL NEUROLOGY NOTE     Chief Complaint   Patient presents with    Extremity Weakness     Pt arrives via EMS from home with cc of extremity and generalized weakness. Pt had a stroke 2 months ago. Pt ambulated to bed from stretcher with assistance and gait was unsteady. Pt also had two falls today with no injuries, LOC.         HPI  History excerpted from Dr. Mitchell's note on 6/27/2024  Gerald Yuen is a 58 y.o. male  with history of CKD, chronic left foot drop in the setting of severe lumbar stenosis and lumbar radiculopathy, and type 2 diabetes with diabetic peripheral neuropathy who presents with 2-week history of worsening gait, imbalance, weakness in the lower extremities.     Patient reports that for the last 6 months to a year he has noticed that the bottom of his feet are numb.  He has been told that he has diabetic neuropathy.  He does not have a neurologist.  He also reports that he has had episodes in the last 6 months where he feels that he has to stop walking and hold onto something due to feeling that his legs are going to give out or that he will fall.  He mostly is concerned with his balance, as he describes significant unsteadiness with walking.     He does have chronic back issues but denies any back pain.  He has chronic left foot drop.  He had an MRI of the lumbar spine in 2021 which showed severe lumbar radiculopathy and canal stenosis. He has not had any surgery.      He denies any other respiratory issues, facial weakness, difficulty chewing, swallowing or speaking, head drop/vision changes that are acute, ptosis. Denies any bladder or bowel incontinence. Has had several falls in the last 2 weeks.         INTERIM DATA: At the time of my evaluation this morning, patient was awake, alert, and oriented.  He denied any new or worsening weakness in his extremities, numbness, visual disturbances, speech, or swallowing difficulty.  He verbalized experiencing bilateral lower extremity  Prediabetes  >6.5              Consider Diabetes          LDL : pending     IMPRESSION:  Gerald Yuen is a 58 y.o. male who presents with subjective bilateral lower extremity weakness and balance issues more pronounced in the last 2 weeks, with chronic left foot drop, history of lumbar spinal stenosis and foraminal stenosis, diabetic induced neuropathy in the lower extremities.  His current neurological examination revealed no focal sensory deficits.  Slight weakness left lower extremity and a length dependent peripheral neuropathy as well as chronic left foot drop and mild left psoas weakness, suspected to be chronic given his MRI of the lumbar spine in 2021.    His symptoms could be multifactorial.  Differential diagnosis include spinal stenosis and lumbar radiculopathy given his left foot drop, neuropathy likely related due to his diabetes causing sensation of unsteady gait, recrudescence of stroke symptoms, acute stroke.  There is no signs and symptoms to suspect any other neuromuscular condition such as GBS.    - Brain MRI without contrast revealed small foci of acute/subacute infarction involving the anterior right body of the corpus callosum/frontal lobe. Background of chronic white matter disease and small chronic infarctions as above. Numerous chronic microhemorrhages may indicate cerebral amyloid angiopathy.    - MRI lumbar spine without contrast showed no acute fracture. Unchanged severe central spinal canal stenosis at L3-L4. New moderate-severe left foraminal stenosis at L4-L5.    - MRA head: Pending    - MRA neck: Pending    - TTE: pending .  Patient did say he had an echo completed recently but was available to review.    - Recent carotids completed on 5/28/2024 showed mild stenosis in bilateral internal carotid artery.  Heterogeneous plaque in bilateral internal carotid artery.  Normal antegrade flow involving bilateral vertebral artery.      RECOMMENDATIONS:  Acute stroke involving anterior

## 2024-06-28 NOTE — PLAN OF CARE
Problem: Safety - Adult  Goal: Free from fall injury  6/28/2024 0848 by Jeni Jeff RN  Outcome: Progressing  6/27/2024 2240 by Sarai Cox LPN  Outcome: Progressing     Problem: Chronic Conditions and Co-morbidities  Goal: Patient's chronic conditions and co-morbidity symptoms are monitored and maintained or improved  6/28/2024 0848 by Jeni Jeff RN  Outcome: Progressing  6/27/2024 2240 by Sarai Cox LPN  Outcome: Progressing     Problem: Skin/Tissue Integrity  Goal: Absence of new skin breakdown  Description: 1.  Monitor for areas of redness and/or skin breakdown  2.  Assess vascular access sites hourly  3.  Every 4-6 hours minimum:  Change oxygen saturation probe site  4.  Every 4-6 hours:  If on nasal continuous positive airway pressure, respiratory therapy assess nares and determine need for appliance change or resting period.  6/28/2024 0848 by Jeni Jeff RN  Outcome: Progressing  6/27/2024 2240 by Sarai Cox LPN  Outcome: Progressing     Problem: Nutrition Deficit:  Goal: Optimize nutritional status  6/28/2024 0848 by Jeni Jeff RN  Outcome: Progressing  6/27/2024 2240 by Sarai Cox LPN  Outcome: Progressing

## 2024-06-28 NOTE — PROGRESS NOTES
06/28/24 0918 06/28/24 0919 06/28/24 0922   Vital Signs   BP (!) 176/89 129/76 (!) 141/60   MAP (Calculated) 118 94 87   MAP (mmHg) 114 89 83   Patient Position Supine Sitting Standing       **Pt AO x3 (thought it was end of May). Reports ~lightheadedness/tired and admits hungry and awaiting breakfast. No report dizziness. Chair flop at end of session**    Isabel Ramos, PT, DPT

## 2024-06-28 NOTE — PROGRESS NOTES
I understand  the patient has changed his mind and does not want to do dialysis.  I spoke to him in the morning and he confirmed it.  Will cancel dialysis procedure today.  His diet can be resumed

## 2024-06-28 NOTE — PROGRESS NOTES
End of Shift Note    Bedside shift change report given to RN (oncoming nurse) by Sarai Cox LPN (offgoing nurse).  Report included the following information SBAR, Kardex, ED Summary, OR Summary, Intake/Output, MAR, and Accordion    Shift worked:  7p-7a     Shift summary and any significant changes:     Pt hypertensive   MRI during beginning of shft   Given hydralazine for high BP x1 , resolved  Pt bedrest for orthostatic eposide w/ PT  Bed alarm on   Pt voiding in urinal low output    NPO midnight for dialysis placed   Pt expressed concern for surgery and \"does not want to move forward with surgery to seek other options\"   No pain      Concerns for physician to address:  Blood pressure     Zone phone for oncoming shift:   [phone]     Activity:     Number times ambulated in hallways past shift: 0  Number of times OOB to chair past shift: 1    Cardiac:   Cardiac Monitoring: Yes           Access:  Current line(s): PIV     Genitourinary:   Urinary status: voiding    Respiratory:      Chronic home O2 use?: NO  Incentive spirometer at bedside: YES       GI:     Current diet:  Diet NPO  Passing flatus: YES  Tolerating current diet: YES       Pain Management:   Patient states pain is manageable on current regimen: YES    Skin:     Interventions: PT/OT consult, limit briefs, and internal/external urinary devices    Patient Safety:  Fall Score:    Interventions: bed/chair alarm, assistive device (walker, cane. etc), gripper socks, pt to call before getting OOB, and stay with me (per policy)       Length of Stay:  Expected LOS: 7  Actual LOS: 2      Sarai Cox LPN

## 2024-06-28 NOTE — FLOWSHEET NOTE
Primary RN SBAR: Marimar Medrano, RN  Patient Education provided: Hd procedure, common complications explained, access care, infection control  Incapacitated Nurse edu. provided: YES  Preferred Education method and Primary language: english, verbal  Hospital associated wait time; reason: NA  Hepatitis B Surface Ag   Date/Time Value Ref Range Status   06/28/2024 04:47 AM <0.10 Index Final     Hep B S Ag Interp   Date/Time Value Ref Range Status   06/28/2024 04:47 AM Negative NEG   Final     Hep B S Ab   Date/Time Value Ref Range Status   06/28/2024 04:47 AM <3.10 mIU/mL Final     Hep B S Ab Interp   Date/Time Value Ref Range Status   06/28/2024 04:47 AM NONREACTIVE NR   Final     Comment:     (NOTE)  The [employer] Centaur Anti-HBs2 assay is traceable to the World Health   Organization (WHO) Hepatitis B Immunoglobulin 1st International   Reference Preparation (1977). Samples with a calculated value of 10   mIU/mL or greater are considered reactive (protective) in accordance   with the CDC guidelines. The accepted criteria for immunity to HBV is   anti-HBs activity greater than or equal to 10 mIU/mL, as defined by   the WHO International Reference Preparation.  Assay performance has not been established in pregnant women,   patients who are immunosuppressed or immunocompromised, nor have   performance characteristics been established in conjunction with   other 's assays for specific HBV serologic markers. This   assay does not differentiate between vaccine induced immune response   and a response due to infection with HBV. Passively acquired anti-HBs   may be identified following patient transfusion, receipt of   immunoglobulin products, etc.       PRE HD   06/28/24 1720   Vital Signs   BP (!) 198/112   Temp 97.7 °F (36.5 °C)   Pulse 81   Respirations 16   Pain Assessment   Pain Assessment None - Denies Pain   Treatment   Time On 1726   Time Off 1926   Treatment Goal 1L/2H   Observations & Evaluations   Level of

## 2024-06-28 NOTE — PROGRESS NOTES
Hospitalist Progress Note    NAME:   Gerald Yuen   : 1965   MRN: [id number]     Date/Time: 2024 3:00 PM  Patient PCP: Bro Lynch APRN - NP    Estimated discharge date: TBD  Barriers: Nephrology clearance, neurology clearance, stroke workup, new HD  Echo, MRa    Assessment / Plan:    SHAHRAM on CKD, ?  Stage IV  -He was told in the past that his kidneys were weak but no recent baseline creatinine.  Suspect progression of underlying diabetic nephropathy   -Hold losartan/HCTZ  - renal ultrasound showed no acute abnormality  Appreciate nephrology consult  Patient agreeable for dialysis today  Temporary dialysis cath placement and postdialysis today, second Allises tomorrow  Planning biopsy next week if feasible to determine SHAHRAM on CKD versus CKD stage V  Will need outpatient dialysis center on discharge    Acute stroke involving anterior right body of corpus callosum/frontal lobe  Neurochecks  Appreciate neurology consult  Stroke pathway  Lipid panel, A1c 6.9  MRA head and neck  Echo  Recommended starting on dual antiplatelets aspirin 81 and Plavix 75 mg x 21 days and then aspirin monotherapy  Permissive hypertension today and will aim for blood pressure less than 140/90 tomorrow  PT OT eval-IPR  Speech eval    HTN  -Holding losartan HCTZ as above  -IV hydralazine as needed  -Allowing permissive hypertension today    Vitamin D deficiency  Started on ergocalciferol     DM2    -Continue correctional sliding scale insulin     Generalized weakness, s/p fall x 2  -Denies head trauma.  -PT OT eval and treat IPR     Lumbosacral spondylosis  Lumbar spinal stenosis  Weakness bilateral lower extremities  MRI spine  No acute fracture.  2. Unchanged severe central spinal canal stenosis at L3-L4.  3. New moderate-severe left foraminal stenosis at L4-L5.             Medical Decision Making:   I personally reviewed labs: CBC, BMP  I personally reviewed imaging:  I personally reviewed EKG: Yes  Toxic drug

## 2024-06-28 NOTE — PLAN OF CARE
Problem: Physical Therapy - Adult  Goal: By Discharge: Performs mobility at highest level of function for planned discharge setting.  See evaluation for individualized goals.  Description: FUNCTIONAL STATUS PRIOR TO ADMISSION: Patient was independent and active without use of DME, though reports inactivity 2/2 weakness/low energy/decreased appetite over the last month.     HOME SUPPORT PRIOR TO ADMISSION: The patient lived with parents and brother but did not require assistance. Pt reports Dad requires assistance from Mom, brother had multiple surgeries and doesn't move very well.     Physical Therapy Goals  Initiated 6/26/2024  1.  Patient will move from supine to sit and sit to supine, scoot up and down, and roll side to side in bed with independence within 7 day(s).    2.  Patient will perform sit to stand with modified independence within 7 day(s).  3.  Patient will transfer from bed to chair and chair to bed with modified independence using the least restrictive device within 7 day(s).  4.  Patient will ambulate with modified independence for 50 feet with the least restrictive device within 7 day(s).   Outcome: Progressing     PHYSICAL THERAPY TREATMENT    Patient: Gerald Yuen (58 y.o. male)  Date: 6/28/2024  Diagnosis: CKD (chronic kidney disease) [N18.9] CKD (chronic kidney disease)      Precautions: Bed Alarm, Fall Risk                      ASSESSMENT:    Patient tolerated PT services well and continues to progress toward PT POC goals. Progress to date less than anticipated 2/2 underlying medical status. Upon arrival, Pt admits increased hunger s/p change from NPO status d/t cancellation of tunnel cath for HD. Pt repeats several times during session goal for more information and MD Team consultation to \"tell me what this is all about before I make a decision\" to enable informed consent. Pt admits likely will do it in a few weeks, but only after learns more about dialysis process and lifelong outlook.  Transl. 2022 Jul 16;4(3):308872. doi: 10.1016/j.arrct.2022.420932. PMID: 94180930; PMCID: [id number].  4. Noman ESCOBAR, Isabelle S, Nelli W, Janice GEE. AM-PAC Short Forms Manual 4.0. Revised 2/2020.                                                                                                                                                                                                                              Pain Rating:  No report or s/s pain.    Pain Intervention(s):   Not applicable.    Activity Tolerance:   Good    After treatment:   Patient left in no apparent distress sitting up in chair, Call bell within reach, and Bed/ chair alarm activated      COMMUNICATION/EDUCATION:   The patient's plan of care was discussed with: registered nurse and MD Team (via Perfect Serve)    Patient Education  Education Given To: Patient  Education Provided: Role of Therapy;Transfer Training;Equipment;Plan of Care;Energy Conservation;Fall Prevention Strategies;Orientation;Precautions  Education Method: Demonstration;Verbal;Teach Back  Barriers to Learning: None  Education Outcome: Verbalized understanding;Demonstrated understanding;Continued education needed      Isabel Ramos, PT, DPT

## 2024-06-28 NOTE — PROGRESS NOTES
Name of Procedure: Anastacio  Right Neck     Vital Signs:  Stable     Fluids Removed: NA     Samples sent to lab: NA     Any complications related to procedure: none identified at this time

## 2024-06-28 NOTE — PROGRESS NOTES
Dr. Weiss notified via perfect serve telemetry orders have , would you like to continue telemetry montioring or discontinue telemetry monitoring? Call back number [phone] provided.

## 2024-06-28 NOTE — PROGRESS NOTES
Pt has expressed several times overnight that he does not want tunneled hemodialysis catheter placed.  Pt has still complied  with NPO guidelines. Message sent to Nephrology MD @ 8315. MD made aware to pt refusal. No new orders at this time.    @ 0790 spoke with Dr. Mary Lenz about patient refusal. Pt spoke with MD. MD will cancel procedure for today and pt is taken off NPO. Per dr. Mary Lenz.No new orders at this time.

## 2024-06-28 NOTE — PROGRESS NOTES
0955: spoke to primary nurse Cortney. Pt has refused to allow procedure to be done today. MD has put in diet order for pt, and nurse advised if we don't do it today it will not get done until Monday at the earliest.

## 2024-06-28 NOTE — CARE COORDINATION
1120 - CM acknowledges receipt of consult placed yesterday evening to arrange HD chair; per Nephro note advising pt has declined HD, CM will disregard at present and follow for changes to plan.    Initial note - [facility] and [facility] have both advised that they are out of network with pt's insurance; [facility] is reviewing referral, following for updates re dialysis. Per Nephro note this morning, pt has declined dialysis at this time. Pt not medically stable for discharge. Will need [facility] Medicare auth for IPR admission.    Mima Eng, KATHY  Care Management  [phone]

## 2024-06-29 ENCOUNTER — APPOINTMENT (OUTPATIENT)
Facility: HOSPITAL | Age: 59
End: 2024-06-29
Payer: MEDICARE

## 2024-06-29 LAB
ALBUMIN SERPL-MCNC: 2.5 G/DL (ref 3.5–5)
ALBUMIN/GLOB SERPL: 0.7 (ref 1.1–2.2)
ALP SERPL-CCNC: 71 U/L (ref 45–117)
ALT SERPL-CCNC: 12 U/L (ref 12–78)
ANION GAP SERPL CALC-SCNC: 4 MMOL/L (ref 5–15)
ANION GAP SERPL CALC-SCNC: 4 MMOL/L (ref 5–15)
AST SERPL-CCNC: 16 U/L (ref 15–37)
BASOPHILS # BLD: 0 K/UL (ref 0–0.1)
BASOPHILS NFR BLD: 0 % (ref 0–1)
BILIRUB SERPL-MCNC: 0.5 MG/DL (ref 0.2–1)
BUN SERPL-MCNC: 34 MG/DL (ref 6–20)
BUN SERPL-MCNC: 42 MG/DL (ref 6–20)
BUN/CREAT SERPL: 6 (ref 12–20)
BUN/CREAT SERPL: 6 (ref 12–20)
CALCIUM SERPL-MCNC: 8.2 MG/DL (ref 8.5–10.1)
CALCIUM SERPL-MCNC: 8.5 MG/DL (ref 8.5–10.1)
CHLORIDE SERPL-SCNC: 106 MMOL/L (ref 97–108)
CHLORIDE SERPL-SCNC: 108 MMOL/L (ref 97–108)
CHOLEST SERPL-MCNC: 204 MG/DL
CO2 SERPL-SCNC: 26 MMOL/L (ref 21–32)
CO2 SERPL-SCNC: 29 MMOL/L (ref 21–32)
CREAT SERPL-MCNC: 5.62 MG/DL (ref 0.7–1.3)
CREAT SERPL-MCNC: 6.97 MG/DL (ref 0.7–1.3)
CREAT UR-MCNC: 118 MG/DL
DIFFERENTIAL METHOD BLD: ABNORMAL
EOSINOPHIL # BLD: 0.2 K/UL (ref 0–0.4)
EOSINOPHIL NFR BLD: 3 % (ref 0–7)
ERYTHROCYTE [DISTWIDTH] IN BLOOD BY AUTOMATED COUNT: 12.5 % (ref 11.5–14.5)
ERYTHROCYTE [DISTWIDTH] IN BLOOD BY AUTOMATED COUNT: 12.7 % (ref 11.5–14.5)
EST. AVERAGE GLUCOSE BLD GHB EST-MCNC: 143 MG/DL
GLOBULIN SER CALC-MCNC: 3.8 G/DL (ref 2–4)
GLUCOSE BLD STRIP.AUTO-MCNC: 137 MG/DL (ref 65–117)
GLUCOSE BLD STRIP.AUTO-MCNC: 156 MG/DL (ref 65–117)
GLUCOSE BLD STRIP.AUTO-MCNC: 182 MG/DL (ref 65–117)
GLUCOSE BLD STRIP.AUTO-MCNC: 187 MG/DL (ref 65–117)
GLUCOSE BLD STRIP.AUTO-MCNC: 210 MG/DL (ref 65–117)
GLUCOSE SERPL-MCNC: 147 MG/DL (ref 65–100)
GLUCOSE SERPL-MCNC: 206 MG/DL (ref 65–100)
HBA1C MFR BLD: 6.6 % (ref 4–5.6)
HCT VFR BLD AUTO: 26.8 % (ref 36.6–50.3)
HCT VFR BLD AUTO: 29.7 % (ref 36.6–50.3)
HDLC SERPL-MCNC: 28 MG/DL
HDLC SERPL: 7.3 (ref 0–5)
HGB BLD-MCNC: 8.7 G/DL (ref 12.1–17)
HGB BLD-MCNC: 9.7 G/DL (ref 12.1–17)
IMM GRANULOCYTES # BLD AUTO: 0 K/UL (ref 0–0.04)
IMM GRANULOCYTES NFR BLD AUTO: 0 % (ref 0–0.5)
LACTATE SERPL-SCNC: 1.4 MMOL/L (ref 0.4–2)
LDLC SERPL CALC-MCNC: 106.8 MG/DL (ref 0–100)
LYMPHOCYTES # BLD: 1.6 K/UL (ref 0.8–3.5)
LYMPHOCYTES NFR BLD: 26 % (ref 12–49)
MAGNESIUM SERPL-MCNC: 2 MG/DL (ref 1.6–2.4)
MCH RBC QN AUTO: 28.4 PG (ref 26–34)
MCH RBC QN AUTO: 28.9 PG (ref 26–34)
MCHC RBC AUTO-ENTMCNC: 32.5 G/DL (ref 30–36.5)
MCHC RBC AUTO-ENTMCNC: 32.7 G/DL (ref 30–36.5)
MCV RBC AUTO: 87.6 FL (ref 80–99)
MCV RBC AUTO: 88.4 FL (ref 80–99)
MONOCYTES # BLD: 0.4 K/UL (ref 0–1)
MONOCYTES NFR BLD: 7 % (ref 5–13)
NEUTS SEG # BLD: 3.9 K/UL (ref 1.8–8)
NEUTS SEG NFR BLD: 64 % (ref 32–75)
NRBC # BLD: 0 K/UL (ref 0–0.01)
NRBC # BLD: 0 K/UL (ref 0–0.01)
NRBC BLD-RTO: 0 PER 100 WBC
NRBC BLD-RTO: 0 PER 100 WBC
PHOSPHATE SERPL-MCNC: 2.2 MG/DL (ref 2.6–4.7)
PHOSPHATE SERPL-MCNC: 2.5 MG/DL (ref 2.6–4.7)
PLATELET # BLD AUTO: 241 K/UL (ref 150–400)
PLATELET # BLD AUTO: 258 K/UL (ref 150–400)
PMV BLD AUTO: 10.1 FL (ref 8.9–12.9)
PMV BLD AUTO: 9.7 FL (ref 8.9–12.9)
POTASSIUM SERPL-SCNC: 4 MMOL/L (ref 3.5–5.1)
POTASSIUM SERPL-SCNC: 4.2 MMOL/L (ref 3.5–5.1)
PROT SERPL-MCNC: 6.3 G/DL (ref 6.4–8.2)
PROT UR-MCNC: 478 MG/DL (ref 0–11.9)
PROT/CREAT UR-RTO: 4.1
RBC # BLD AUTO: 3.06 M/UL (ref 4.1–5.7)
RBC # BLD AUTO: 3.36 M/UL (ref 4.1–5.7)
SERVICE CMNT-IMP: ABNORMAL
SODIUM SERPL-SCNC: 138 MMOL/L (ref 136–145)
SODIUM SERPL-SCNC: 139 MMOL/L (ref 136–145)
TRIGL SERPL-MCNC: 346 MG/DL
VLDLC SERPL CALC-MCNC: 69.2 MG/DL
WBC # BLD AUTO: 6.2 K/UL (ref 4.1–11.1)
WBC # BLD AUTO: 6.8 K/UL (ref 4.1–11.1)

## 2024-06-29 PROCEDURE — 84100 ASSAY OF PHOSPHORUS: CPT

## 2024-06-29 PROCEDURE — 83735 ASSAY OF MAGNESIUM: CPT

## 2024-06-29 PROCEDURE — 85027 COMPLETE CBC AUTOMATED: CPT

## 2024-06-29 PROCEDURE — 85025 COMPLETE CBC W/AUTO DIFF WBC: CPT

## 2024-06-29 PROCEDURE — 84156 ASSAY OF PROTEIN URINE: CPT

## 2024-06-29 PROCEDURE — 6360000002 HC RX W HCPCS: Performed by: INTERNAL MEDICINE

## 2024-06-29 PROCEDURE — 6370000000 HC RX 637 (ALT 250 FOR IP): Performed by: INTERNAL MEDICINE

## 2024-06-29 PROCEDURE — 1100000003 HC PRIVATE W/ TELEMETRY

## 2024-06-29 PROCEDURE — 83605 ASSAY OF LACTIC ACID: CPT

## 2024-06-29 PROCEDURE — 2580000003 HC RX 258: Performed by: STUDENT IN AN ORGANIZED HEALTH CARE EDUCATION/TRAINING PROGRAM

## 2024-06-29 PROCEDURE — 82962 GLUCOSE BLOOD TEST: CPT

## 2024-06-29 PROCEDURE — 83036 HEMOGLOBIN GLYCOSYLATED A1C: CPT

## 2024-06-29 PROCEDURE — 80061 LIPID PANEL: CPT

## 2024-06-29 PROCEDURE — 6370000000 HC RX 637 (ALT 250 FOR IP)

## 2024-06-29 PROCEDURE — 80053 COMPREHEN METABOLIC PANEL: CPT

## 2024-06-29 PROCEDURE — 90935 HEMODIALYSIS ONE EVALUATION: CPT

## 2024-06-29 PROCEDURE — 36415 COLL VENOUS BLD VENIPUNCTURE: CPT

## 2024-06-29 PROCEDURE — 70450 CT HEAD/BRAIN W/O DYE: CPT

## 2024-06-29 PROCEDURE — 82570 ASSAY OF URINE CREATININE: CPT

## 2024-06-29 RX ORDER — HEPARIN SODIUM 1000 [USP'U]/ML
INJECTION, SOLUTION INTRAVENOUS; SUBCUTANEOUS
Status: DISPENSED
Start: 2024-06-29 | End: 2024-06-30

## 2024-06-29 RX ADMIN — SODIUM CHLORIDE, PRESERVATIVE FREE 10 ML: 5 INJECTION INTRAVENOUS at 21:06

## 2024-06-29 RX ADMIN — HEPARIN SODIUM 1300 UNITS: 1000 INJECTION INTRAVENOUS; SUBCUTANEOUS at 18:04

## 2024-06-29 RX ADMIN — CLOPIDOGREL BISULFATE 75 MG: 75 TABLET ORAL at 09:22

## 2024-06-29 RX ADMIN — ASPIRIN 81 MG: 81 TABLET, CHEWABLE ORAL at 09:22

## 2024-06-29 RX ADMIN — Medication 5000 UNITS: at 09:22

## 2024-06-29 RX ADMIN — AMLODIPINE BESYLATE 10 MG: 5 TABLET ORAL at 09:30

## 2024-06-29 RX ADMIN — HEPARIN SODIUM 5000 UNITS: 5000 INJECTION INTRAVENOUS; SUBCUTANEOUS at 21:03

## 2024-06-29 RX ADMIN — INSULIN LISPRO 1 UNITS: 100 INJECTION, SOLUTION INTRAVENOUS; SUBCUTANEOUS at 12:06

## 2024-06-29 RX ADMIN — HEPARIN SODIUM 5000 UNITS: 5000 INJECTION INTRAVENOUS; SUBCUTANEOUS at 06:51

## 2024-06-29 RX ADMIN — HEPARIN SODIUM 5000 UNITS: 5000 INJECTION INTRAVENOUS; SUBCUTANEOUS at 15:34

## 2024-06-29 RX ADMIN — ATORVASTATIN CALCIUM 40 MG: 40 TABLET, FILM COATED ORAL at 21:03

## 2024-06-29 RX ADMIN — SODIUM CHLORIDE, PRESERVATIVE FREE 10 ML: 5 INJECTION INTRAVENOUS at 09:32

## 2024-06-29 ASSESSMENT — PAIN SCALES - GENERAL: PAINLEVEL_OUTOF10: 0

## 2024-06-29 NOTE — CARE COORDINATION
Transition of Care Plan:    RUR: 15% (moderate RUR)   Prior Level of Functioning: Independent  Disposition: IPR and then home with new outpatient HD  If SNF or IPR: Date FOC offered: 06/27/24  Date FOC received: 06/27/24  Accepting facility: [facility] following, they are the only facility in network   Date authorization started with reference number: Will need [facility] auth   Date authorization received and expires: Pending auth start  Follow up appointments: PCP/Specialists as indicated  DME needed: None at this time  Transportation at discharge: Family to transport  IM/IMM Medicare/ letter given: N/a  Is patient a  and connected with [facility]? N/a   If yes, was  transfer form completed and [facility] notified? N/a  Caregiver Contact: Traci Yuen; parent; [phone]  Discharge Caregiver contacted prior to discharge? Pt to contact  Care Conference needed? Not at this time  Barriers to discharge: outpatient HD set-up, medical clearance, clinical improvement    0833 - Assumed transitions of care planning from RONALD Eng. Chart review complete. Noted patient needing new HD outpatient set-up, consult received to arrange care with [facility]. [facility] [place] @ [phone] appears to be closest clinic to pt's home address. CM will work to initiate HD chair if able over the weekend.     CM attempted to contact [facility] @ [phone]. Unable to make contact, unsure if they are available over the weekend. CM will continue to make attempts.     6269 - Attempted again contact [facility], unable to reach them. CM made [facility] aware of efforts to establish HD chair prior to transition to IPR. [facility] yet to accept. CARLITOS updated.     Tatum Caldera [facility]  Care Management  [facility]  [phone]      Tatum Caldera [facility]  Care Management  [facility]  [phone]

## 2024-06-29 NOTE — PROGRESS NOTES
End of Shift Note    Bedside shift change report given to  VALERIA Denise  (oncoming nurse) by Marimar Medrano RN .        Shift worked:  7a-7p   Shift summary and any significant changes:    HD done today.  No complaints of pain. Patient slept on and off throughout of the day.     Concerns for physician to address:  None   Zone phone for oncoming shift:   [phone]     Patient Information  Gerald Yuen  58 y.o.  6/25/2024 10:54 PM by Matthew Hart MD. Gerald Yuen was admitted from [facility]    Problem List  Patient Active Problem List    Diagnosis Date Noted    CKD (chronic kidney disease) 06/26/2024     Past Medical History:   Diagnosis Date    Cerebral artery occlusion with cerebral infarction (HCC)     Chronic kidney disease     Diabetes mellitus (HCC)        Core Measures:  CVA: yes  CHF: no  PNA: no    Activity:     Number times ambulated in hallways past shift: 0  Number of times OOB to chair past shift: 0    Cardiac:   Cardiac Monitoring: yes, SR    Access:   Current line(s): PIV, HD catheter right neck    Respiratory:   O2 Device: None (Room air)    GI:  Last BM (including prior to admit): 06/26/24  Current diet:  ADULT DIET; Regular; Low Fat/Low Chol/High Fiber/2 gm Na; Low Potassium (Less than 3000 mg/day); Low Phosphorus (Less than 1000 mg)  DIET ONE TIME MESSAGE;  Tolerating current diet: Yes    Pain Management:   Patient states pain is manageable on current regimen: yes    Skin:  Sanket Scale Score: 20  Interventions: N/A  Pressure injury: no    Patient Safety:  Fall Score: Gilbert Total Score: 85  Interventions: bed alarm  Self-release roll belt: No  Dexterity to release roll belt: yes   (must document dexterity  here by stating Yes or No here, otherwise this is a restraint and must follow restraint documentation policy.)    DVT prophylaxis:  DVT prophylaxis: meds    Active Consults:  IP CONSULT TO NEPHROLOGY  IP CONSULT TO NEUROLOGY  IP CONSULT TO NEUROLOGY  IP CONSULT TO CASE MANAGEMENT    Length of

## 2024-06-29 NOTE — PROGRESS NOTES
End of Shift Note    Bedside shift change report given to VALERIA Minaya  (oncoming nurse) by Camelia Choe RN .        Shift worked:  7p - 7a   Shift summary and any significant changes:    No acute changes. MRI done. 1 Ltr from Dialysis.         Concerns for physician to address:  None   Zone phone for oncoming shift:   [phone]     Patient Information  Gerald Yuen  58 y.o.  6/25/2024 10:54 PM by Matthew Hart MD. Gerald Yuen was admitted from [facility]    Problem List  Patient Active Problem List    Diagnosis Date Noted    CKD (chronic kidney disease) 06/26/2024     Past Medical History:   Diagnosis Date    Cerebral artery occlusion with cerebral infarction (HCC)     Chronic kidney disease     Diabetes mellitus (HCC)        Core Measures:  CVA: yes  CHF: no  PNA: no    Activity:     Number times ambulated in hallways past shift: 0  Number of times OOB to chair past shift: 0    Cardiac:   Cardiac Monitoring: yes, SR    Access:   Current line(s): PIV, HD catheter right neck    Respiratory:   O2 Device: None (Room air)    GI:  Last BM (including prior to admit): 06/26/24  Current diet:  Diet NPO  Tolerating current diet: Yes    Pain Management:   Patient states pain is manageable on current regimen: yes    Skin:  Sanket Scale Score: 20  Interventions: N/A  Pressure injury: no    Patient Safety:  Fall Score: Gilbert Total Score: 85  Interventions: bed alarm  Self-release roll belt: No  Dexterity to release roll belt: yes   (must document dexterity  here by stating Yes or No here, otherwise this is a restraint and must follow restraint documentation policy.)    DVT prophylaxis:  DVT prophylaxis: meds    Active Consults:  IP CONSULT TO NEPHROLOGY  IP CONSULT TO NEUROLOGY  IP CONSULT TO NEUROLOGY  IP CONSULT TO CASE MANAGEMENT    Length of Stay:  Expected LOS: 5  Actual LOS: 3    Camelia Choe RN

## 2024-06-29 NOTE — PROGRESS NOTES
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  Had HD 6/28.  Renal ultrasound report reviewed and kidney size is not small  UPCR 3.3 g.  Clinical picture is suggestive of diabetic nephropathy in which kidney size is on the larger side.  Clinically he either has CKD stage V or an episode of SHAHRAM on CKD  Spoke to him about dialysis and need for dialysis.  He is agreeable to continue dialysis  Agree with holding losartan hydrochlorothiazide  Appreciate neurology input  Plan:   Temporary hemodialysis catheter placement.  HD # 2 today.  Possible biopsy next week if feasible to determine SHAHRAM on CKD versus CKD 5  Placement in outpatient dialysis center by case management                       Signed By: Memo Gaspar MD     June 29, 2024           Consult Date: 6/29/2024        Subjective   Seen and examined.    Hiding under bed sheets        PMH:  Past Medical History:   Diagnosis Date    Cerebral artery occlusion with cerebral infarction (HCC)     Chronic kidney disease     Diabetes mellitus (HCC)      PSH:  Past Surgical History:   Procedure Laterality Date    IR NONTUNNELED VASCULAR CATHETER  6/28/2024    IR NONTUNNELED VASCULAR CATHETER 6/28/2024 Iqra Rolle APRN - NP [facility] IR       Social history:        Family history:  No history of CKD or ESRD in the family.     No Known Allergies    Current Facility-Administered Medications   Medication Dose Route Frequency Provider Last Rate Last Admin    aspirin chewable tablet 81 mg  81 mg Oral Daily Stephen

## 2024-06-29 NOTE — FLOWSHEET NOTE
06/29/24 1800   Vital Signs   BP (!) 157/86   Temp 98.1 °F (36.7 °C)   Pulse 83   Respirations 18   Pain Assessment   Pain Assessment None - Denies Pain   Post-Hemodialysis Assessment   Post-Treatment Procedures Blood returned;Catheter capped, clamped and heparinized x 2 ports   Machine Disinfection Process Acid/Vinegar Clean;Heat Disinfect;Exterior Machine Disinfection   Rinseback Volume (ml) 300 ml   Blood Volume Processed (Liters) 28.4 L   Dialyzer Clearance Clear   Duration of Treatment (minutes) 120 minutes   Hemodialysis Intake (ml) 500 ml   Hemodialysis Output (ml) 1500 ml   NET Removed (ml) 1000   Tolerated Treatment Good   Bilateral Breath Sounds Clear   Edema None   Time Off 1800   Patient Disposition Return to room   Observations & Evaluations   Level of Consciousness 0   Oriented X 4   Heart Rhythm Regular   Respiratory Quality/Effort Unlabored   O2 Device None (Room air)   Skin Condition/Temp Dry;Warm   Appetite Good   Abdomen Inspection Soft   Bowel Sounds (All Quadrants) Active     Primary RN SBAR: Marimar Medrano, RN  Comments: Pt tolerated treatment well without any complaints or complications. 1000ml removed.

## 2024-06-29 NOTE — FLOWSHEET NOTE
Primary RN SBAR: Marimar Medrano, VALERIA  Patient Education provided: HD treatment  Incapacitated Nurse claribel. provided: to patient  Preferred Education method and Primary language: Verbal, English  Hospital associated wait time; reason: 22 min wait for transport  Hepatitis B Surface Ag   Date/Time Value Ref Range Status   06/28/2024 04:47 AM <0.10 Index Final     Hep B S Ag Interp   Date/Time Value Ref Range Status   06/28/2024 04:47 AM Negative NEG   Final     Hep B S Ab   Date/Time Value Ref Range Status   06/28/2024 04:47 AM <3.10 mIU/mL Final     Hep B S Ab Interp   Date/Time Value Ref Range Status   06/28/2024 04:47 AM NONREACTIVE NR   Final     Comment:     (NOTE)  The [employer] Centaur Anti-HBs2 assay is traceable to the World Health   Organization (WHO) Hepatitis B Immunoglobulin 1st International   Reference Preparation (1977). Samples with a calculated value of 10   mIU/mL or greater are considered reactive (protective) in accordance   with the CDC guidelines. The accepted criteria for immunity to HBV is   anti-HBs activity greater than or equal to 10 mIU/mL, as defined by   the WHO International Reference Preparation.  Assay performance has not been established in pregnant women,   patients who are immunosuppressed or immunocompromised, nor have   performance characteristics been established in conjunction with   other 's assays for specific HBV serologic markers. This   assay does not differentiate between vaccine induced immune response   and a response due to infection with HBV. Passively acquired anti-HBs   may be identified following patient transfusion, receipt of   immunoglobulin products, etc.          06/29/24 1600   Vital Signs   BP (!) 199/99   Temp 98 °F (36.7 °C)   Pulse 86   Respirations 18   Pain Assessment   Pain Assessment None - Denies Pain   Treatment   Time On 1600   Treatment Goal 1000 ml   Observations & Evaluations   Level of Consciousness 0   Oriented X 4   Heart Rhythm Regular

## 2024-06-29 NOTE — PROGRESS NOTES
Hospitalist Progress Note    NAME:   Gerald Yuen   : 1965   MRN: [id number]     Date/Time: 2024 2:41 PM  Patient PCP: Bro Lynch APRN - NP    Estimated discharge date: TBD  Barriers: Nephrology clearance, neurology clearance, stroke workup, new HD  Echo, MRa    Assessment / Plan:    SHAHRAM on CKD, ?  Stage IV  -He was told in the past that his kidneys were weak but no recent baseline creatinine.  Suspect progression of underlying diabetic nephropathy   -Hold losartan/HCTZ  - renal ultrasound showed no acute abnormality  Appreciate nephrology consult  Status post temporary dialysis cath placement on , HD on ,   Planning biopsy next week if feasible to determine SHAHRAM on CKD versus CKD stage V  Will need outpatient dialysis center on discharge    Acute stroke involving anterior right body of corpus callosum/frontal lobe  Neurochecks  Appreciate neurology consult  Stroke pathway  Lipid panel, A1c 6.9  MRA head and neck unremarkable  Echo 60 to 65% EF, no thrombus mentioned on report  Recommended starting on dual antiplatelets aspirin 81 and Plavix 75 mg x 21 days and then aspirin monotherapy  Blood pressure goal less than 140/90 t  PT OT eval-IPR  Speech eval regular diet    HTN  -Holding losartan HCTZ as above  -IV hydralazine as needed  Started on amlodipine 10 mg  May add blood pressure medication as needed  Blood pressure currently uncontrolled    Vitamin D deficiency  Started on ergocalciferol     DM2    -Continue correctional sliding scale insulin     Generalized weakness, s/p fall x 2  -Denies head trauma.  -PT OT eval and treat IPR     Lumbosacral spondylosis  Lumbar spinal stenosis  Weakness bilateral lower extremities  MRI spine  No acute fracture.  2. Unchanged severe central spinal canal stenosis at L3-L4.  3. New moderate-severe left foraminal stenosis at L4-L5.             Medical Decision Making:   I personally reviewed labs: CBC, BMP  I personally reviewed imaging:

## 2024-06-29 NOTE — PLAN OF CARE
Problem: Safety - Adult  Goal: Free from fall injury  6/29/2024 1042 by Marimar Medrano RN  Outcome: Progressing  6/28/2024 2315 by Camelia Choe RN  Outcome: Progressing     Problem: Chronic Conditions and Co-morbidities  Goal: Patient's chronic conditions and co-morbidity symptoms are monitored and maintained or improved  6/29/2024 1042 by Marimar Medrano RN  Outcome: Progressing  6/28/2024 2315 by Camelia Choe RN  Outcome: Progressing     Problem: Skin/Tissue Integrity  Goal: Absence of new skin breakdown  Description: 1.  Monitor for areas of redness and/or skin breakdown  2.  Assess vascular access sites hourly  3.  Every 4-6 hours minimum:  Change oxygen saturation probe site  4.  Every 4-6 hours:  If on nasal continuous positive airway pressure, respiratory therapy assess nares and determine need for appliance change or resting period.  6/29/2024 1042 by Marimar Medrano RN  Outcome: Progressing  6/28/2024 2315 by Camelia Choe RN  Outcome: Progressing     Problem: Nutrition Deficit:  Goal: Optimize nutritional status  6/29/2024 1042 by Marimar eMdrano RN  Outcome: Progressing  6/28/2024 2315 by Camelia Choe RN  Outcome: Progressing     Problem: Discharge Planning  Goal: Discharge to home or other facility with appropriate resources  6/29/2024 1042 by Marimar Medrano RN  Outcome: Progressing  6/28/2024 2315 by Camelia Choe RN  Outcome: Progressing

## 2024-06-29 NOTE — PROGRESS NOTES
Noted small R frontal CVA in posterior area.Patient denies any speech or swallowing issues. He passed a Angela Swallow screen and is on a regular diet.   SLP evaluation deferred at this time.

## 2024-06-29 NOTE — CASE COMMUNICATION
COMMUNICATION NOTE - Neurology Service    Name:  Gerald Yuen     MRN: [id number]         Communication Note:   I discussed with the hospitalist team.  MRA No significant stenosis demonstrated.   Small foci of acute/subacute infarction involving the anterior right body of the  corpus callosum/frontal lobe.  DAPT for 21 days (81 mg ASA and 75 mg Plavix daily) followed by transition back to ASA monotherapy. Continue high intensity statin once daily. Avoid PPI and NSAIDs while on DAPT. Okay to take Tums if needed for acid reflux while on DAPT.  Obtain outpatient cardiac monitoring to eval for cardioembolic source of stroke  I explained in detail about the current condition.   Rest of the management per primary team.  Neurology will sign off.   Please do not hesitate to call with questions or concerns.  Please let us know if we can provide any additional information.

## 2024-06-30 LAB
ALBUMIN SERPL-MCNC: 2.8 G/DL (ref 3.5–5)
ANION GAP SERPL CALC-SCNC: 6 MMOL/L (ref 5–15)
BASOPHILS # BLD: 0 K/UL (ref 0–0.1)
BASOPHILS NFR BLD: 1 % (ref 0–1)
BUN SERPL-MCNC: 35 MG/DL (ref 6–20)
BUN/CREAT SERPL: 6 (ref 12–20)
CALCIUM SERPL-MCNC: 8.7 MG/DL (ref 8.5–10.1)
CHLORIDE SERPL-SCNC: 105 MMOL/L (ref 97–108)
CO2 SERPL-SCNC: 27 MMOL/L (ref 21–32)
CREAT SERPL-MCNC: 5.71 MG/DL (ref 0.7–1.3)
DIFFERENTIAL METHOD BLD: ABNORMAL
EOSINOPHIL # BLD: 0.2 K/UL (ref 0–0.4)
EOSINOPHIL NFR BLD: 3 % (ref 0–7)
ERYTHROCYTE [DISTWIDTH] IN BLOOD BY AUTOMATED COUNT: 12.6 % (ref 11.5–14.5)
GLUCOSE BLD STRIP.AUTO-MCNC: 183 MG/DL (ref 65–117)
GLUCOSE BLD STRIP.AUTO-MCNC: 184 MG/DL (ref 65–117)
GLUCOSE BLD STRIP.AUTO-MCNC: 187 MG/DL (ref 65–117)
GLUCOSE BLD STRIP.AUTO-MCNC: 191 MG/DL (ref 65–117)
GLUCOSE SERPL-MCNC: 136 MG/DL (ref 65–100)
HCT VFR BLD AUTO: 27.9 % (ref 36.6–50.3)
HGB BLD-MCNC: 8.8 G/DL (ref 12.1–17)
IMM GRANULOCYTES # BLD AUTO: 0 K/UL (ref 0–0.04)
IMM GRANULOCYTES NFR BLD AUTO: 0 % (ref 0–0.5)
LYMPHOCYTES # BLD: 1.9 K/UL (ref 0.8–3.5)
LYMPHOCYTES NFR BLD: 28 % (ref 12–49)
MCH RBC QN AUTO: 28 PG (ref 26–34)
MCHC RBC AUTO-ENTMCNC: 31.5 G/DL (ref 30–36.5)
MCV RBC AUTO: 88.9 FL (ref 80–99)
MONOCYTES # BLD: 0.6 K/UL (ref 0–1)
MONOCYTES NFR BLD: 9 % (ref 5–13)
NEUTS SEG # BLD: 4 K/UL (ref 1.8–8)
NEUTS SEG NFR BLD: 59 % (ref 32–75)
NRBC # BLD: 0 K/UL (ref 0–0.01)
NRBC BLD-RTO: 0 PER 100 WBC
PHOSPHATE SERPL-MCNC: 2.4 MG/DL (ref 2.6–4.7)
PLATELET # BLD AUTO: 257 K/UL (ref 150–400)
PMV BLD AUTO: 10.3 FL (ref 8.9–12.9)
POTASSIUM SERPL-SCNC: 4.1 MMOL/L (ref 3.5–5.1)
RBC # BLD AUTO: 3.14 M/UL (ref 4.1–5.7)
SERVICE CMNT-IMP: ABNORMAL
SODIUM SERPL-SCNC: 138 MMOL/L (ref 136–145)
WBC # BLD AUTO: 6.7 K/UL (ref 4.1–11.1)

## 2024-06-30 PROCEDURE — 1100000003 HC PRIVATE W/ TELEMETRY

## 2024-06-30 PROCEDURE — 6370000000 HC RX 637 (ALT 250 FOR IP): Performed by: INTERNAL MEDICINE

## 2024-06-30 PROCEDURE — 95813 EEG EXTND MNTR 61-119 MIN: CPT | Performed by: PSYCHIATRY & NEUROLOGY

## 2024-06-30 PROCEDURE — 6360000002 HC RX W HCPCS: Performed by: INTERNAL MEDICINE

## 2024-06-30 PROCEDURE — 85025 COMPLETE CBC W/AUTO DIFF WBC: CPT

## 2024-06-30 PROCEDURE — 80069 RENAL FUNCTION PANEL: CPT

## 2024-06-30 PROCEDURE — 6370000000 HC RX 637 (ALT 250 FOR IP)

## 2024-06-30 PROCEDURE — 2580000003 HC RX 258: Performed by: INTERNAL MEDICINE

## 2024-06-30 PROCEDURE — 36415 COLL VENOUS BLD VENIPUNCTURE: CPT

## 2024-06-30 PROCEDURE — 82962 GLUCOSE BLOOD TEST: CPT

## 2024-06-30 PROCEDURE — 2580000003 HC RX 258: Performed by: STUDENT IN AN ORGANIZED HEALTH CARE EDUCATION/TRAINING PROGRAM

## 2024-06-30 RX ORDER — 0.9 % SODIUM CHLORIDE 0.9 %
250 INTRAVENOUS SOLUTION INTRAVENOUS ONCE
Status: COMPLETED | OUTPATIENT
Start: 2024-06-30 | End: 2024-06-30

## 2024-06-30 RX ADMIN — CLOPIDOGREL BISULFATE 75 MG: 75 TABLET ORAL at 10:46

## 2024-06-30 RX ADMIN — SODIUM CHLORIDE 250 ML: 9 INJECTION, SOLUTION INTRAVENOUS at 18:00

## 2024-06-30 RX ADMIN — ASPIRIN 81 MG: 81 TABLET, CHEWABLE ORAL at 10:46

## 2024-06-30 RX ADMIN — Medication 5000 UNITS: at 10:47

## 2024-06-30 RX ADMIN — SODIUM CHLORIDE, PRESERVATIVE FREE 10 ML: 5 INJECTION INTRAVENOUS at 10:47

## 2024-06-30 RX ADMIN — ATORVASTATIN CALCIUM 40 MG: 40 TABLET, FILM COATED ORAL at 21:08

## 2024-06-30 RX ADMIN — SODIUM CHLORIDE, PRESERVATIVE FREE 10 ML: 5 INJECTION INTRAVENOUS at 21:08

## 2024-06-30 RX ADMIN — AMLODIPINE BESYLATE 10 MG: 5 TABLET ORAL at 10:45

## 2024-06-30 RX ADMIN — HEPARIN SODIUM 5000 UNITS: 5000 INJECTION INTRAVENOUS; SUBCUTANEOUS at 05:44

## 2024-06-30 ASSESSMENT — PAIN SCALES - GENERAL
PAINLEVEL_OUTOF10: 0

## 2024-06-30 NOTE — PROGRESS NOTES
End of Shift Note    Bedside shift change report given to Marimar RN  (oncoming nurse) by Camelia Choe RN .        Shift worked:  7p - 7a   Shift summary and any significant changes:    Rapid response called during shift change.  CT done.  Neuro consult ordered.      Concerns for physician to address:  None   Zone phone for oncoming shift:   [phone]     Patient Information  Gerald Yuen  58 y.o.  6/25/2024 10:54 PM by Matthew Hart MD. Gerald Yuen was admitted from [facility]    Problem List  Patient Active Problem List    Diagnosis Date Noted    CKD (chronic kidney disease) 06/26/2024     Past Medical History:   Diagnosis Date    Cerebral artery occlusion with cerebral infarction (HCC)     Chronic kidney disease     Diabetes mellitus (HCC)        Core Measures:  CVA: yes  CHF: no  PNA: no    Activity:     Number times ambulated in hallways past shift: 0  Number of times OOB to chair past shift: 0    Cardiac:   Cardiac Monitoring: yes, SR    Access:   Current line(s): PIV, HD catheter right neck    Respiratory:   O2 Device: None (Room air)    GI:  Last BM (including prior to admit): 06/26/24  Current diet:  ADULT DIET; Regular; Low Fat/Low Chol/High Fiber/2 gm Na; Low Potassium (Less than 3000 mg/day); Low Phosphorus (Less than 1000 mg)  DIET ONE TIME MESSAGE;  Tolerating current diet: Yes    Pain Management:   Patient states pain is manageable on current regimen: yes    Skin:  Sanket Scale Score: 20  Interventions: N/A  Pressure injury: no    Patient Safety:  Fall Score: Gilbert Total Score: 85  Interventions: bed alarm  Self-release roll belt: No  Dexterity to release roll belt: yes   (must document dexterity  here by stating Yes or No here, otherwise this is a restraint and must follow restraint documentation policy.)    DVT prophylaxis:  DVT prophylaxis: meds    Active Consults:  IP CONSULT TO NEPHROLOGY  IP CONSULT TO NEUROLOGY  IP CONSULT TO NEUROLOGY  IP CONSULT TO CASE MANAGEMENT  IP CONSULT TO

## 2024-06-30 NOTE — PROGRESS NOTES
Responded to Rapid response call and on arrival patient  received in bathroom with staff after a syncopal episode. Patient assisted to bed and vitals obtained with observed SpO2 100% and HR 94bpm on room air. No respiratory intervention needed at this time.

## 2024-06-30 NOTE — CARE COORDINATION
Consult noted to arrange outpatient HD chair with [facility]; chart review completed. CM called [facility] [phone], no answer, recording stated that new referrals should be faxed to [phone] for review. Faxed referral packet, unit CM to follow up during business hours.    Mima Eng [facility]  Care Management  [phone]

## 2024-06-30 NOTE — PLAN OF CARE
Problem: Safety - Adult  Goal: Free from fall injury  6/30/2024 1025 by Marimar Medrano RN  Outcome: Progressing  6/29/2024 2203 by Camelia Choe RN  Outcome: Progressing     Problem: Chronic Conditions and Co-morbidities  Goal: Patient's chronic conditions and co-morbidity symptoms are monitored and maintained or improved  6/30/2024 1025 by Marimar Medrano RN  Outcome: Progressing  6/29/2024 2203 by Camelia Choe RN  Outcome: Progressing     Problem: Skin/Tissue Integrity  Goal: Absence of new skin breakdown  Description: 1.  Monitor for areas of redness and/or skin breakdown  2.  Assess vascular access sites hourly  3.  Every 4-6 hours minimum:  Change oxygen saturation probe site  4.  Every 4-6 hours:  If on nasal continuous positive airway pressure, respiratory therapy assess nares and determine need for appliance change or resting period.  6/30/2024 1025 by Marimar Medrano RN  Outcome: Progressing  6/29/2024 2203 by Camelia Choe RN  Outcome: Progressing     Problem: Nutrition Deficit:  Goal: Optimize nutritional status  6/30/2024 1025 by Marimar Medrano RN  Outcome: Progressing  6/29/2024 2203 by Camelia Choe RN  Outcome: Progressing     Problem: Discharge Planning  Goal: Discharge to home or other facility with appropriate resources  6/30/2024 1025 by Marimar Medrano RN  Outcome: Progressing  6/29/2024 2203 by Camelia Choe RN  Outcome: Progressing

## 2024-06-30 NOTE — PROGRESS NOTES
RAPID RESPONSE TEAM    Overhead rapid response paged to room # 130/01  at [id number]    Reason for rapid response: Unresponsiveness     Initial assessment:   Upon arrival, patient is sitting on the toilet with the help of the tech. Patient's head is down, he is vomiting, very diaphoretic and will not respond to any stimuli. Patient opened eyes after a minute and eyes were deviated to the right. Patient still would not respond to any stimuli at this time. A few minutes later, patient came to and began talking again. Patient was brought back to the bed with maximum assistance. When back in the bed patient's vitals were as follows; HR: 91, BP: 200/102, RR:16, T:98.1, O2 sat 100%(room air). Blood glucose checked and was normal. When back in the bed patient said that he did not remember what had happened. Patient also stated that he has these episodes often at home and that they do not always happen on the toilet. Patient states that he usually does vomit with these episodes at home also.    Dr. MASOUD Hart at bedside, orders received for the following Interventions:   -STAT head CT  -CBC, BMP, Mag, Phos  -Rapid EEG  -Complete bedrest    Outcome:   pt to remain in room # 130/01         Please call with any questions or concerns    Ame Frost RN  Rapid Response Team  Ext [phone]     Recent Results (from the past 8 hour(s))   POCT Glucose    Collection Time: 06/29/24  8:31 PM   Result Value Ref Range    POC Glucose 187 (H) 65 - 117 mg/dL    Performed by: Sweta Duncan PCT    POCT Glucose    Collection Time: 06/29/24 10:55 PM   Result Value Ref Range    POC Glucose 137 (H) 65 - 117 mg/dL    Performed by: Дмитрий Denise RN    CBC    Collection Time: 06/29/24 11:06 PM   Result Value Ref Range    WBC 6.8 4.1 - 11.1 K/uL    RBC 3.36 (L) 4.10 - 5.70 M/uL    Hemoglobin 9.7 (L) 12.1 - 17.0 g/dL    Hematocrit 29.7 (L) 36.6 - 50.3 %    MCV 88.4 80.0 - 99.0 FL    MCH 28.9 26.0 - 34.0 PG    MCHC 32.7 30.0 - 36.5 g/dL    RDW 12.5 11.5 -  14.5 %    Platelets 258 150 - 400 K/uL    MPV 9.7 8.9 - 12.9 FL    Nucleated RBCs 0.0 0  WBC    nRBC 0.00 0.00 - 0.01 K/uL   Basic Metabolic Panel    Collection Time: 06/29/24 11:06 PM   Result Value Ref Range    Sodium 139 136 - 145 mmol/L    Potassium 4.0 3.5 - 5.1 mmol/L    Chloride 106 97 - 108 mmol/L    CO2 29 21 - 32 mmol/L    Anion Gap 4 (L) 5 - 15 mmol/L    Glucose 147 (H) 65 - 100 mg/dL    BUN 34 (H) 6 - 20 MG/DL    Creatinine 5.62 (H) 0.70 - 1.30 MG/DL    BUN/Creatinine Ratio 6 (L) 12 - 20      Est, Glom Filt Rate 11 (L) >60 ml/min/1.73m2    Calcium 8.5 8.5 - 10.1 MG/DL   Magnesium    Collection Time: 06/29/24 11:06 PM   Result Value Ref Range    Magnesium 2.0 1.6 - 2.4 mg/dL   Phosphorus    Collection Time: 06/29/24 11:06 PM   Result Value Ref Range    Phosphorus 2.2 (L) 2.6 - 4.7 MG/DL   Lactic Acid    Collection Time: 06/29/24 11:06 PM   Result Value Ref Range    Lactic Acid, Plasma 1.4 0.4 - 2.0 MMOL/L

## 2024-06-30 NOTE — PROGRESS NOTES
End of Shift Note    Bedside shift change report given to  VALERIA Laboy  (oncoming nurse) by Marimar Medrano RN .        Shift worked:  7a-7p   Shift summary and any significant changes:    No complaints of pain. No significant changes. See earlier note for orthostatics     Concerns for physician to address:  None   Zone phone for oncoming shift:   [phone]     Patient Information  Gerald Yuen  58 y.o.  6/25/2024 10:54 PM by Matthew Hart MD. Gerald Yuen was admitted from [facility]    Problem List  Patient Active Problem List    Diagnosis Date Noted    CKD (chronic kidney disease) 06/26/2024     Past Medical History:   Diagnosis Date    Cerebral artery occlusion with cerebral infarction (HCC)     Chronic kidney disease     Diabetes mellitus (HCC)        Core Measures:  CVA: yes  CHF: no  PNA: no    Activity:     Number times ambulated in hallways past shift: 0  Number of times OOB to chair past shift: 0    Cardiac:   Cardiac Monitoring: yes, SR    Access:   Current line(s): PIV, HD catheter right neck    Respiratory:   O2 Device: None (Room air)    GI:  Last BM (including prior to admit): 06/26/24  Current diet:  ADULT DIET; Regular; Low Fat/Low Chol/High Fiber/2 gm Na; Low Potassium (Less than 3000 mg/day); Low Phosphorus (Less than 1000 mg)  DIET ONE TIME MESSAGE;  Diet NPO  Tolerating current diet: Yes    Pain Management:   Patient states pain is manageable on current regimen: yes    Skin:  Sanket Scale Score: 20  Interventions: N/A  Pressure injury: no    Patient Safety:  Fall Score: Gilbert Total Score: 85  Interventions: bed alarm  Self-release roll belt: No  Dexterity to release roll belt: yes   (must document dexterity  here by stating Yes or No here, otherwise this is a restraint and must follow restraint documentation policy.)    DVT prophylaxis:  DVT prophylaxis: meds    Active Consults:  IP CONSULT TO NEPHROLOGY  IP CONSULT TO NEUROLOGY  IP CONSULT TO NEUROLOGY  IP CONSULT TO CASE MANAGEMENT  IP CONSULT TO

## 2024-06-30 NOTE — PROGRESS NOTES
06/30/24        I have been asked to see this patient by Maricarmen Weiss MD  for advice/opinion re: -----                                                        Assessment:          SHAHRAM on CKD  CKD stage IV or progressive CKD 5  Diabetic nephropathy  Generalized weakness  Diabetic neuropathy  Hypertension associate with CKD   Discussion:     Had refused HD previously.  Had HD 6/28 and 6/29  Renal ultrasound report reviewed and kidney size is not small  UPCR 3.3 g.  Clinical picture is suggestive of diabetic nephropathy in which kidney size is on the larger side.  Clinically he either has CKD stage V or an episode of SHAHRAM on CKD  Spoke to him about dialysis and need for dialysis.  He is agreeable to continue dialysis  Agree with holding losartan hydrochlorothiazide  Appreciate neurology input  Plan:   Temporary hemodialysis catheter placement.  No acute need for HD today.  Reassess in AM.  If ESRD, biopsy will not be of benefit.  Placement in outpatient dialysis center by case management      DW pt and family at bedside.    ADDENDUM: (4702) After a thorough review of his chart, I see where he has carried a diagnosis of CKD-5 and has been seen by Dr. Snyder.  It looks as if he was supposed to get an AVF last year but did not.  I suspect that he has ESRD.  I will call Dr. Snyder's office tomorrow to confirm this information.  He will need a permcath.  I will make him NPO after MN and hold heparin.  Hopefully IR can do tomorrow.  If the patient has been following with Dr. Snyder, then we'll need to set up  outpatient HD at one of his units.                       Signed By: Memo Gaspar MD     June 30, 2024           Consult Date: 6/30/2024        Subjective   Seen and examined.    \"I feel fine.\"  No N/V.  No dyspnea.          PMH:  Past Medical History:   Diagnosis Date    Cerebral  Status: He is oriented to person, place, and time.          Data Review:   Recent Results (from the past 24 hour(s))   POCT Glucose    Collection Time: 06/29/24 11:41 AM   Result Value Ref Range    POC Glucose 210 (H) 65 - 117 mg/dL    Performed by: Mitchell Minaya RN    Protein / creatinine ratio, urine    Collection Time: 06/29/24 12:22 PM   Result Value Ref Range    Protein, Urine, Random 478 (H) 0.0 - 11.9 mg/dL    Creatinine, Ur 118.00 mg/dL    PROTEIN/CREAT RATIO URINE RAN 4.1     POCT Glucose    Collection Time: 06/29/24  3:33 PM   Result Value Ref Range    POC Glucose 182 (H) 65 - 117 mg/dL    Performed by: Mitchell Minaya RN    POCT Glucose    Collection Time: 06/29/24  8:31 PM   Result Value Ref Range    POC Glucose 187 (H) 65 - 117 mg/dL    Performed by: Sweta DESHPANDE    POCT Glucose    Collection Time: 06/29/24 10:55 PM   Result Value Ref Range    POC Glucose 137 (H) 65 - 117 mg/dL    Performed by: Дмитрий Denise RN    CBC    Collection Time: 06/29/24 11:06 PM   Result Value Ref Range    WBC 6.8 4.1 - 11.1 K/uL    RBC 3.36 (L) 4.10 - 5.70 M/uL    Hemoglobin 9.7 (L) 12.1 - 17.0 g/dL    Hematocrit 29.7 (L) 36.6 - 50.3 %    MCV 88.4 80.0 - 99.0 FL    MCH 28.9 26.0 - 34.0 PG    MCHC 32.7 30.0 - 36.5 g/dL    RDW 12.5 11.5 - 14.5 %    Platelets 258 150 - 400 K/uL    MPV 9.7 8.9 - 12.9 FL    Nucleated RBCs 0.0 0  WBC    nRBC 0.00 0.00 - 0.01 K/uL   Basic Metabolic Panel    Collection Time: 06/29/24 11:06 PM   Result Value Ref Range    Sodium 139 136 - 145 mmol/L    Potassium 4.0 3.5 - 5.1 mmol/L    Chloride 106 97 - 108 mmol/L    CO2 29 21 - 32 mmol/L    Anion Gap 4 (L) 5 - 15 mmol/L    Glucose 147 (H) 65 - 100 mg/dL    BUN 34 (H) 6 - 20 MG/DL    Creatinine 5.62 (H) 0.70 - 1.30 MG/DL    BUN/Creatinine Ratio 6 (L) 12 - 20      Est, Glom Filt Rate 11 (L) >60 ml/min/1.73m2    Calcium 8.5 8.5 - 10.1 MG/DL   Magnesium    Collection Time: 06/29/24 11:06 PM   Result Value Ref Range    Magnesium 2.0 1.6 - 2.4 mg/dL

## 2024-06-30 NOTE — PROGRESS NOTES
Headband: removed  Date/Time: 6/30/24 0120  Recorder: recording stopped  Skin: intact  Highest Seizure [place] Percentage past hour: 0%    Patient could not tolerate the full two hours because he was uncomfortable, recording stopped after 1 hour 15 minutes.      General info regarding Seizure [place] %:  Minimum duration of study is 2 hours. If Seizure Br[place]en[place] has remained 0% throughout the entire 2-hour duration, communicate with provider to stop the recording.     Seizure [place] 0-10% - Continue to monitor and complete 2-hour study.  Seizure [place] 11-89% - Epileptiform activity present. Notify provider for next steps.  Seizure [place] >/= 90% - Epileptiform activity consistent with Status Epilepticus. Immediately notify provider.     *Patients with Seizure Brackenridge above 10% that persists may require a study longer than 2 hours. Maximum recording duration is 24 hours. Please update provider with a persistent increase in Seizure Brackenridge above 10%.

## 2024-06-30 NOTE — PROGRESS NOTES
Pt orthostatic positive   Supine BP high , cannot add midodrine  Recommend compression stocking   Will do 250 ivf bolus- New HD pt

## 2024-06-30 NOTE — PROGRESS NOTES
Hospitalist Progress Note    NAME:   Gerald Yuen   : 1965   MRN: [id number]     Date/Time: 2024 12:07 PM  Patient PCP: Bro Lynch APRN - NP    Estimated discharge date:7/3  Barriers: Nephrology clearance, , new HD, changing to permanent cath      Assessment / Plan:    SHAHRAM on CKD, ?  Stage IV  -He was told in the past that his kidneys were weak but no recent baseline creatinine.  Suspect progression of underlying diabetic nephropathy   -Hold losartan/HCTZ  - renal ultrasound showed no acute abnormality  Appreciate nephrology consult  Status post temporary dialysis cath placement on , HD on ,   Planning biopsy next week if feasible to determine SHAHRAM on CKD versus CKD stage V  Will need outpatient dialysis center on discharge    -will check with nephrology whether planning permanent cath on Monday.  If so we will place order    Acute stroke involving anterior right body of corpus callosum/frontal lobe  Neurochecks  Appreciate neurology consult  Stroke pathway  Lipid panel, A1c 6.9  MRA head and neck unremarkable  Echo 60 to 65% EF, no thrombus mentioned on report  Recommended starting on dual antiplatelets aspirin 81 and Plavix 75 mg x 21 days and then aspirin monotherapy  Avoid PPI and NSAIDs while on DAPT. Okay to take Tums if needed for acid reflux while on DAPT.   Blood pressure goal less than 140/90 t  PT OT eval-IPR  Speech eval regular diet  Will need outpatient event monitor-cardiology consult placed for the       overnight events  Syncopal episode while in toilet  Repeat CT head stable  Rapid EEG no seizure burden  Repeat neurology consult pending  ?  Orthostatic versus vasovagal event given patient no dialysis and received dialysis x 2  Check orthostatics  Will hold off adding new blood pressure medications today  Blood pressure 148/87      HTN  -Holding losartan HCTZ as above  -IV hydralazine as needed  Started on amlodipine 10 mg  May add blood pressure

## 2024-06-30 NOTE — PROGRESS NOTES
Tech walked patient to the bathroom.  Patient passed out and vomited while on the toilet.  RR called overhead at 2242.  Patient assisted to bed.  Vitals checked.  Blood sugar checked.  Blood drawn.  Patient cleaned up and taken to STAT CT.

## 2024-06-30 NOTE — PROGRESS NOTES
Orthostatic Vitals:      6/30/2024     4:09 PM   Orthostatic Vitals   Orthostatic B/P and Pulse? Yes   Blood Pressure Lying 158/87   Pulse Lying 87 PER MINUTE   Blood Pressure Sitting 127/64   Pulse Sitting 88 PER MINUTE   Blood Pressure Standing 83/46   Pulse Standing 92 PER MINUTE      Patient reported dizziness and lightheadedness while standing. He had to hold onto the side of the bed.

## 2024-06-30 NOTE — SIGNIFICANT EVENT
Rapid Response Note    S:   Rapid response called overhead for episode of unresponsiveness.  On my arrival, patient seated on toilet slumped to 1 side minimally responsive.    Shortly after, patient waking up however still groggy.  Was helped by staff to bed.    O:   Vitals were reviewed showing heart rate 91, blood pressure 200/102, respiratory rate normal, temperature normal.  Blood glucose was obtained and was normal in the 130s.    A/P:  Patient with witnessed episode of unresponsiveness while in the toilet.  Full episode lasted several minutes, he woke up and shortly after was back to apparent baseline, conversational and answering questions appropriately.  Discussed with the patient as well as his family and he states that he has had these episodes off and on for some time and family confirm this.  -Stat head CT was obtained which was negative for acute findings  -Labs were sent and reviewed with no acute findings  -Rapid EEG was obtained showing seizure burden of 0% after 1 hour and 15 minutes  -Bedrest for now  -Re-ordered Neurology consult        Matthew Hart MD  6/30/2024      I have spent 31 minutes of critical care time involved in lab review, consultations with specialist, family decision- making, bedside attention and documentation. During this entire length of time I was immediately available to the patient .  Management of conditions posed significant threat to the following systems and required intervention and frequent monitoring due to high risk of deterioration posting threat to life.      Critical Care:  The reason for providing this level of medical care for this critically ill patient was due to a critical illness that impaired one or more vital organ systems, such that there was a high probability of imminent or life threatening deterioration in the patient's condition. This care involved high complexity decision making to assess, manipulate, and support vital system functions, to treat

## 2024-07-01 ENCOUNTER — APPOINTMENT (OUTPATIENT)
Facility: HOSPITAL | Age: 59
End: 2024-07-01
Attending: INTERNAL MEDICINE
Payer: MEDICARE

## 2024-07-01 ENCOUNTER — HOSPITAL ENCOUNTER (INPATIENT)
Facility: HOSPITAL | Age: 59
Discharge: HOME | End: 2024-07-04
Payer: MEDICARE

## 2024-07-01 VITALS
SYSTOLIC BLOOD PRESSURE: 169 MMHG | OXYGEN SATURATION: 98 % | DIASTOLIC BLOOD PRESSURE: 90 MMHG | HEART RATE: 80 BPM | RESPIRATION RATE: 15 BRPM

## 2024-07-01 PROBLEM — R41.82 ACUTE ALTERATION IN MENTAL STATUS: Status: ACTIVE | Noted: 2024-07-01

## 2024-07-01 PROBLEM — R55 CONVULSIVE SYNCOPE: Status: ACTIVE | Noted: 2024-07-01

## 2024-07-01 PROBLEM — I63.9 CEREBROVASCULAR ACCIDENT (CVA) (HCC): Status: ACTIVE | Noted: 2024-07-01

## 2024-07-01 PROBLEM — I63.50 CEREBRAL ARTERY OCCLUSION WITH CEREBRAL INFARCTION (HCC): Status: ACTIVE | Noted: 2024-07-01

## 2024-07-01 LAB
ALBUMIN SERPL-MCNC: 2.5 G/DL (ref 3.5–5)
ANION GAP SERPL CALC-SCNC: 4 MMOL/L (ref 5–15)
BASOPHILS # BLD: 0 K/UL (ref 0–0.1)
BASOPHILS NFR BLD: 0 % (ref 0–1)
BUN SERPL-MCNC: 45 MG/DL (ref 6–20)
BUN/CREAT SERPL: 7 (ref 12–20)
CALCIUM SERPL-MCNC: 7.9 MG/DL (ref 8.5–10.1)
CHLORIDE SERPL-SCNC: 109 MMOL/L (ref 97–108)
CO2 SERPL-SCNC: 27 MMOL/L (ref 21–32)
CREAT SERPL-MCNC: 6.76 MG/DL (ref 0.7–1.3)
DIFFERENTIAL METHOD BLD: ABNORMAL
ECHO BSA: 2.08 M2
EOSINOPHIL # BLD: 0.2 K/UL (ref 0–0.4)
EOSINOPHIL NFR BLD: 3 % (ref 0–7)
ERYTHROCYTE [DISTWIDTH] IN BLOOD BY AUTOMATED COUNT: 12.8 % (ref 11.5–14.5)
GLUCOSE BLD STRIP.AUTO-MCNC: 109 MG/DL (ref 65–117)
GLUCOSE BLD STRIP.AUTO-MCNC: 130 MG/DL (ref 65–117)
GLUCOSE BLD STRIP.AUTO-MCNC: 156 MG/DL (ref 65–117)
GLUCOSE SERPL-MCNC: 188 MG/DL (ref 65–100)
HCT VFR BLD AUTO: 27.6 % (ref 36.6–50.3)
HGB BLD-MCNC: 8.9 G/DL (ref 12.1–17)
IMM GRANULOCYTES # BLD AUTO: 0 K/UL (ref 0–0.04)
IMM GRANULOCYTES NFR BLD AUTO: 0 % (ref 0–0.5)
LYMPHOCYTES # BLD: 1.5 K/UL (ref 0.8–3.5)
LYMPHOCYTES NFR BLD: 20 % (ref 12–49)
MCH RBC QN AUTO: 28.7 PG (ref 26–34)
MCHC RBC AUTO-ENTMCNC: 32.2 G/DL (ref 30–36.5)
MCV RBC AUTO: 89 FL (ref 80–99)
MONOCYTES # BLD: 0.5 K/UL (ref 0–1)
MONOCYTES NFR BLD: 7 % (ref 5–13)
NEUTS SEG # BLD: 5.1 K/UL (ref 1.8–8)
NEUTS SEG NFR BLD: 70 % (ref 32–75)
NRBC # BLD: 0 K/UL (ref 0–0.01)
NRBC BLD-RTO: 0 PER 100 WBC
PHOSPHATE SERPL-MCNC: 2.2 MG/DL (ref 2.6–4.7)
PLATELET # BLD AUTO: 226 K/UL (ref 150–400)
PMV BLD AUTO: 10 FL (ref 8.9–12.9)
POTASSIUM SERPL-SCNC: 4.7 MMOL/L (ref 3.5–5.1)
RBC # BLD AUTO: 3.1 M/UL (ref 4.1–5.7)
SERVICE CMNT-IMP: ABNORMAL
SERVICE CMNT-IMP: ABNORMAL
SERVICE CMNT-IMP: NORMAL
SODIUM SERPL-SCNC: 140 MMOL/L (ref 136–145)
VAS LEFT CCA DIST EDV: 11.8 CM/S
VAS LEFT CCA DIST PSV: 74.4 CM/S
VAS LEFT CCA PROX EDV: 21.2 CM/S
VAS LEFT CCA PROX PSV: 108.9 CM/S
VAS LEFT ECA EDV: 8.4 CM/S
VAS LEFT ECA PSV: 119.8 CM/S
VAS LEFT ICA DIST EDV: 30.2 CM/S
VAS LEFT ICA DIST PSV: 85.5 CM/S
VAS LEFT ICA MID EDV: 24.1 CM/S
VAS LEFT ICA MID PSV: 76.9 CM/S
VAS LEFT ICA PROX EDV: 15.5 CM/S
VAS LEFT ICA PROX PSV: 63.4 CM/S
VAS LEFT ICA/CCA PSV: 1.1 NO UNITS
VAS LEFT SUBCLAVIAN PROX EDV: 0 CM/S
VAS LEFT SUBCLAVIAN PROX PSV: 137.1 CM/S
VAS LEFT VERTEBRAL EDV: 12.8 CM/S
VAS LEFT VERTEBRAL PSV: 52.5 CM/S
VAS RIGHT CCA DIST EDV: 14.6 CM/S
VAS RIGHT CCA DIST PSV: 63.7 CM/S
VAS RIGHT ECA EDV: 7.9 CM/S
VAS RIGHT ECA PSV: 168.2 CM/S
VAS RIGHT ICA DIST EDV: 22.8 CM/S
VAS RIGHT ICA DIST PSV: 75.6 CM/S
VAS RIGHT ICA MID EDV: 25.3 CM/S
VAS RIGHT ICA MID PSV: 76.9 CM/S
VAS RIGHT ICA PROX EDV: 21.6 CM/S
VAS RIGHT ICA PROX PSV: 70.7 CM/S
VAS RIGHT ICA/CCA PSV: 1.2 NO UNITS
VAS RIGHT SUBCLAVIAN PROX EDV: 0 CM/S
VAS RIGHT SUBCLAVIAN PROX PSV: 89.3 CM/S
VAS RIGHT VERTEBRAL EDV: 7.5 CM/S
VAS RIGHT VERTEBRAL PSV: 39.6 CM/S
WBC # BLD AUTO: 7.3 K/UL (ref 4.1–11.1)

## 2024-07-01 PROCEDURE — 93880 EXTRACRANIAL BILAT STUDY: CPT | Performed by: PSYCHIATRY & NEUROLOGY

## 2024-07-01 PROCEDURE — 6360000002 HC RX W HCPCS: Performed by: PHYSICIAN ASSISTANT

## 2024-07-01 PROCEDURE — 2580000003 HC RX 258: Performed by: STUDENT IN AN ORGANIZED HEALTH CARE EDUCATION/TRAINING PROGRAM

## 2024-07-01 PROCEDURE — C1769 GUIDE WIRE: HCPCS

## 2024-07-01 PROCEDURE — 6370000000 HC RX 637 (ALT 250 FOR IP): Performed by: INTERNAL MEDICINE

## 2024-07-01 PROCEDURE — 6360000002 HC RX W HCPCS: Performed by: STUDENT IN AN ORGANIZED HEALTH CARE EDUCATION/TRAINING PROGRAM

## 2024-07-01 PROCEDURE — 82962 GLUCOSE BLOOD TEST: CPT

## 2024-07-01 PROCEDURE — 85025 COMPLETE CBC W/AUTO DIFF WBC: CPT

## 2024-07-01 PROCEDURE — 95706 EEG WO VID 2-12HR INTMT MNTR: CPT

## 2024-07-01 PROCEDURE — 1100000003 HC PRIVATE W/ TELEMETRY

## 2024-07-01 PROCEDURE — 0JH63XZ INSERTION OF TUNNELED VASCULAR ACCESS DEVICE INTO CHEST SUBCUTANEOUS TISSUE AND FASCIA, PERCUTANEOUS APPROACH: ICD-10-PCS | Performed by: STUDENT IN AN ORGANIZED HEALTH CARE EDUCATION/TRAINING PROGRAM

## 2024-07-01 PROCEDURE — 02PYX3Z REMOVAL OF INFUSION DEVICE FROM GREAT VESSEL, EXTERNAL APPROACH: ICD-10-PCS | Performed by: STUDENT IN AN ORGANIZED HEALTH CARE EDUCATION/TRAINING PROGRAM

## 2024-07-01 PROCEDURE — 6370000000 HC RX 637 (ALT 250 FOR IP)

## 2024-07-01 PROCEDURE — 2500000003 HC RX 250 WO HCPCS: Performed by: STUDENT IN AN ORGANIZED HEALTH CARE EDUCATION/TRAINING PROGRAM

## 2024-07-01 PROCEDURE — 80069 RENAL FUNCTION PANEL: CPT

## 2024-07-01 PROCEDURE — 93880 EXTRACRANIAL BILAT STUDY: CPT

## 2024-07-01 PROCEDURE — 36415 COLL VENOUS BLD VENIPUNCTURE: CPT

## 2024-07-01 PROCEDURE — 02H633Z INSERTION OF INFUSION DEVICE INTO RIGHT ATRIUM, PERCUTANEOUS APPROACH: ICD-10-PCS | Performed by: STUDENT IN AN ORGANIZED HEALTH CARE EDUCATION/TRAINING PROGRAM

## 2024-07-01 PROCEDURE — 90935 HEMODIALYSIS ONE EVALUATION: CPT

## 2024-07-01 RX ORDER — HEPARIN SODIUM 200 [USP'U]/100ML
200 INJECTION, SOLUTION INTRAVENOUS ONCE
Status: DISCONTINUED | OUTPATIENT
Start: 2024-07-01 | End: 2024-07-05 | Stop reason: HOSPADM

## 2024-07-01 RX ORDER — LIDOCAINE HYDROCHLORIDE 20 MG/ML
20 INJECTION, SOLUTION INFILTRATION; PERINEURAL ONCE
Status: COMPLETED | OUTPATIENT
Start: 2024-07-01 | End: 2024-07-01

## 2024-07-01 RX ORDER — FENTANYL CITRATE 50 UG/ML
INJECTION, SOLUTION INTRAMUSCULAR; INTRAVENOUS PRN
Status: COMPLETED | OUTPATIENT
Start: 2024-07-01 | End: 2024-07-01

## 2024-07-01 RX ORDER — MIDAZOLAM HYDROCHLORIDE 5 MG/5ML
INJECTION, SOLUTION INTRAMUSCULAR; INTRAVENOUS PRN
Status: COMPLETED | OUTPATIENT
Start: 2024-07-01 | End: 2024-07-01

## 2024-07-01 RX ORDER — HEPARIN SODIUM 5000 [USP'U]/ML
10000 INJECTION, SOLUTION INTRAVENOUS; SUBCUTANEOUS ONCE
Status: COMPLETED | OUTPATIENT
Start: 2024-07-01 | End: 2024-07-01

## 2024-07-01 RX ADMIN — ASPIRIN 81 MG: 81 TABLET, CHEWABLE ORAL at 15:39

## 2024-07-01 RX ADMIN — CLOPIDOGREL BISULFATE 75 MG: 75 TABLET ORAL at 15:39

## 2024-07-01 RX ADMIN — Medication 5000 UNITS: at 15:39

## 2024-07-01 RX ADMIN — AMLODIPINE BESYLATE 10 MG: 5 TABLET ORAL at 15:39

## 2024-07-01 RX ADMIN — SODIUM CHLORIDE, PRESERVATIVE FREE 10 ML: 5 INJECTION INTRAVENOUS at 20:14

## 2024-07-01 RX ADMIN — LIDOCAINE HYDROCHLORIDE 15 ML: 20 INJECTION, SOLUTION INFILTRATION; PERINEURAL at 10:53

## 2024-07-01 RX ADMIN — HEPARIN SODIUM 3800 UNITS: 5000 INJECTION, SOLUTION INTRAVENOUS; SUBCUTANEOUS at 10:52

## 2024-07-01 RX ADMIN — FENTANYL CITRATE 25 MCG: 50 INJECTION, SOLUTION INTRAMUSCULAR; INTRAVENOUS at 10:38

## 2024-07-01 RX ADMIN — ATORVASTATIN CALCIUM 40 MG: 40 TABLET, FILM COATED ORAL at 20:13

## 2024-07-01 RX ADMIN — MIDAZOLAM HYDROCHLORIDE 1 MG: 1 INJECTION, SOLUTION INTRAMUSCULAR; INTRAVENOUS at 10:38

## 2024-07-01 RX ADMIN — SODIUM CHLORIDE, PRESERVATIVE FREE 10 ML: 5 INJECTION INTRAVENOUS at 15:40

## 2024-07-01 ASSESSMENT — PAIN SCALES - GENERAL
PAINLEVEL_OUTOF10: 0

## 2024-07-01 NOTE — PROGRESS NOTES
End of Shift Note    Bedside shift change report given to  VALERIA Shaw  (oncoming nurse) by Narda Arias RN .        Shift worked:  Nights    Shift summary and any significant changes:    No complaints of pain. No significant changes. See earlier note for orthostatics. Plan for IR today for tunneled catheter.      Concerns for physician to address:  None   Zone phone for oncoming shift:   [phone]     Patient Information  Gerald Yuen  58 y.o.  6/25/2024 10:54 PM by Matthew Hart MD. Gerald Yuen was admitted from [facility]    Problem List  Patient Active Problem List    Diagnosis Date Noted    CKD (chronic kidney disease) 06/26/2024     Past Medical History:   Diagnosis Date    Cerebral artery occlusion with cerebral infarction (HCC)     Chronic kidney disease     Diabetes mellitus (HCC)        Core Measures:  CVA: yes  CHF: no  PNA: no    Activity:  Level of Assistance: Other (Comment) (bedrst)  Number times ambulated in hallways past shift: 0  Number of times OOB to chair past shift: 0    Cardiac:   Cardiac Monitoring: yes, SR    Access:   Current line(s): PIV, HD catheter right neck    Respiratory:   O2 Device: None (Room air)    GI:  Last BM (including prior to admit): 06/26/24  Current diet:  ADULT DIET; Regular; Low Fat/Low Chol/High Fiber/2 gm Na; Low Potassium (Less than 3000 mg/day); Low Phosphorus (Less than 1000 mg)  DIET ONE TIME MESSAGE;  Diet NPO  Tolerating current diet: Yes    Pain Management:   Patient states pain is manageable on current regimen: yes    Skin:  Sanket Scale Score: 18  Interventions: N/A  Pressure injury: no    Patient Safety:  Fall Score: Gilbert Total Score: 85  Interventions: bed alarm  Self-release roll belt: No  Dexterity to release roll belt: yes   (must document dexterity  here by stating Yes or No here, otherwise this is a restraint and must follow restraint documentation policy.)    DVT prophylaxis:  DVT prophylaxis: meds    Active Consults:  IP CONSULT TO NEPHROLOGY  IP

## 2024-07-01 NOTE — PROGRESS NOTES
End of Shift Note     Bedside shift change report given to VALERIA Laboy  (oncoming nurse) by Valreia Marino RN .          Shift worked: days   Shift summary and any significant changes:     Patient had tunneled HD cath placed today  Patient had dialysis today  CM following for DC needs  Cardiology came to bedside  Nephro following         Concerns for physician to address:  None   Zone phone for oncoming shift:   [phone]      Patient Information  Gerald Yuen  58 y.o.  6/25/2024 10:54 PM by Matthew Hart MD. Gerald Yuen was admitted from [facility]     Problem List       Patient Active Problem List     Diagnosis Date Noted    CKD (chronic kidney disease) 06/26/2024      Past Medical History        Past Medical History:   Diagnosis Date    Cerebral artery occlusion with cerebral infarction (HCC)      Chronic kidney disease      Diabetes mellitus (HCC)              Core Measures:  CVA: yes  CHF: no  PNA: no     Activity:  Level of Assistance: Other (Comment) (bedrst)  Number times ambulated in hallways past shift: 0  Number of times OOB to chair past shift: 0     Cardiac:   Cardiac Monitoring: yes, SR     Access:   Current line(s): PIV, HD catheter right neck     Respiratory:   O2 Device: None (Room air)     GI:  Last BM (including prior to admit): 06/26/24  Current diet:  ADULT DIET; Regular; Low Fat/Low Chol/High Fiber/2 gm Na; Low Potassium (Less than 3000 mg/day); Low Phosphorus (Less than 1000 mg)  DIET ONE TIME MESSAGE;  Diet NPO  Tolerating current diet: Yes     Pain Management:   Patient states pain is manageable on current regimen: yes     Skin:  Sanket Scale Score: 18  Interventions: N/A  Pressure injury: no    Patient Safety:  Fall Score: Gilbert Total Score: 85  Interventions: bed alarm  Self-release roll belt: No  Dexterity to release roll belt: yes   (must document dexterity  here by stating Yes or No here, otherwise this is a restraint and must follow restraint documentation policy.)     DVT

## 2024-07-01 NOTE — PROCEDURES
PROCEDURE NOTE  Date: 6/30/2024   Name: Gerald Yuen  YOB: 1965    Procedures      Test Date: June 30, 2024    History: Patient with syncopal episode on the commode, rule out seizures    Medications: See chart    Patient consent: Correct patient identified    Description of procedure: This EEG was obtained using a 10 lead, 8 channel system positioned circumferentially without any parasagittal coverage (rapid EEG). Computer selected EEG is reviewed as well as background features and all clinically significant events. Clarity algorithm utilized and implemented to provide analysis of underlying activity and seizure detection used to facilitate reading.    Description of recording: This is a rapid EEG on the patient for a syncopal episode that occurred when he was on the commode just before midnight, and patient passed out, he is for EEG to rule out seizures, rule out convulsive syncope, rule out epilepsy.  This study began on June 29 at 11:59 PM and ended on June 30 at 1:15 AM for total time of study 1 hour and 16 minutes.  During this recording there was considerable muscle and movement and electrode artifact in the recording but the study overall was interpretable.  The patient had a somewhat poorly formed very low amplitude 8 to 9 Hz posteriorly located occipital alpha rhythm that did attenuate some with eye opening.  The overall rhythms were somewhat low in amplitude throughout the recording, and there was a mild to moderate generalized slowing with an increase in 2 to 6 Hz activity seen throughout the recording.  The patient appeared to be in a state of sleep during the recording with some sleep spindles and K complexes seen.  Hyperventilation was not performed.  Photic stimulation was not performed.  During this recording there were no clear spike or spike and wave discharges seen, and no recorded dysrhythmic or electrographic spells of any type seen, and no epileptiform discharges, focal

## 2024-07-01 NOTE — CONSULTS
IP Cardiology Consult       Date of consult:  07/01/24  Date of admission: 6/25/2024  Primary Cardiologist: VIKA  Physician Requesting consult: Dr Kim       Assessment:    Problem list:   SHAHRAM over CKD, ESRD - now on HD since 6/28   Acute CVA, anterior right body of corpus callosum / frontal lobe   Syncope 6/29 - while on toilet, previous history of syncopal episode  Diabetes  Hypertension  Hyperlipidemia    Lives with brother, currently not working, previously working as a     CT head 6/29  No acute intracranial hemorrhage, mass or territorial infarct.  Nonvisualization of patient's known right corpus callosal body\frontal lobe  acute infarct, seen on prior MR dated June 27, 2024.    MRI head  - 6/27  Small foci of acute/subacute infarction involving the anterior right body of the  corpus callosum/frontal lobe. Background of chronic white matter disease and  small chronic infarctions as above. Numerous chronic microhemorrhages may  indicate cerebral amyloid angiopathy.    Carotid duplex: Mild Carotid artery disease     Echo in 6/2024-     Left Ventricle: Normal left ventricular systolic function with a visually estimated EF of 60 - 65%. Left ventricle size is normal. Normal wall thickness. Normal wall motion.          Recommendations:    Continue aspirin and Plavix for CVA per neuro  Continue Lipitor 40 mg daily  Continue amlodipine 10 mg daily  Titrate blood pressure medication as needed  Continue to monitor on telemetry  Event monitor as outpatient to evaluate for arrhythmia, follow-up with clinic for the same    Thank you for this consult and allowing me to take part in this patients care.  Please call with questions.        [x]        High complexity decision making was performed      CC / Reason for consult: CVA    History of the presenting illness:  Gerald Yuen is a 58 y.o. male with past medical history of diabetes, CKD, CVA presented to emergency room with bilateral lower  PSV 63.7 cm/s    Right CCA dist EDV 14.6 cm/s    Right ICA dist PSV 75.6 cm/s    Right ICA dist EDV 22.8 cm/s    Right ICA mid PSV 76.9 cm/s    Right ICA mid EDV 25.3 cm/s    Right ICA prox PSV 70.7 cm/s    Right ICA prox EDV 21.6 cm/s    Right ECA .2 cm/s    Right ECA EDV 7.90 cm/s    Right vertebral PSV 39.6 cm/s    Right vertebral EDV 7.50 cm/s    Right ICA/CCA PSV 1.2 no units    Left subclavian prox .1 cm/s    Left subclavian prox EDV 0.0 cm/s    Left CCA dist PSV 74.4 cm/s    Left CCA dist EDV 11.8 cm/s    Left CCA prox .9 cm/s    Left CCA prox EDV 21.2 cm/s    Left ICA dist PSV 85.5 cm/s    Left ICA dist EDV 30.2 cm/s    Left ICA mid PSV 76.9 cm/s    Left ICA mid EDV 24.1 cm/s    Left ICA prox PSV 63.4 cm/s    Left ICA prox EDV 15.5 cm/s    Left ECA .8 cm/s    Left ECA EDV 8.40 cm/s    Left vertebral PSV 52.5 cm/s    Left vertebral EDV 12.80 cm/s    Left ICA/CCA PSV 1.10 no units    Body Surface Area 2.08 m2          IR TUNNELED CVC PLACE WO SQ PORT/PUMP > 5 YEARS    Result Date: 2024  PATIENT NAME: SUMAN PRITCHARD                                            AGE, : 58 years, 1965 MRN: [id number] DATE: 2024 11:11 AM SUPERVISING PHYSICIAN: Parrish Mello MD PERFORMING PROVIDER: Jaylyn Oliva PA-C PROCEDURE: Conversion of a temporary hemodialysis catheter to a tunneled Permacath HISTORY: End-stage renal disease MEDICATION: Antibiotic: Ancef 2g Versed:  1   mg Fentanyl: 25 mcg Intraprocedure time: 10 minutes TECHNIQUE: After informed consent was obtained, and the risks and benefits of the procedure including infection and bleeding were discussed, the patient was brought to the angiographic suite and placed on the angiographic table in a supine position. The right neck and chest, including the indwelling catheter, were prepped and draped using maximum sterile barrier technique which includes: cap and mask, sterile gown, sterile gloves, and sterile body drape. A timeout

## 2024-07-01 NOTE — PROGRESS NOTES
Occupational Therapy    Chart reviewed and interventions attempted. Pt BRENTON at HD. Will defer and continue to follow.

## 2024-07-01 NOTE — CARE COORDINATION
Transition of Care Plan:     RUR: 16% (moderate RUR)   Prior Level of Functioning: Independent  Disposition: IPR -HDH-P  and then home with new outpatient HD  If SNF or IPR: Date FOC offered: 06/27/24  Date FOC received: 06/27/24  Accepting facility: [facility] accepted  Date authorization started with reference number: Will need [facility] auth   Date authorization received and expires: Pending auth start  Follow up appointments: PCP/Specialists as indicated  DME needed: None at this time  Transportation at discharge: Family to transport  IM/IMM Medicare/ letter given: N/a  Is patient a [place] and connected with [facility]? N/a              If yes, was  transfer form completed and [facility] notified? N/a  Caregiver Contact: Traci Yuen; parent; [phone]  Discharge Caregiver contacted prior to discharge? Pt to contact  Care Conference needed? Not at this time  Barriers to discharge: outpatient HD set-up, medical clearance, clinical improvement, auth for IPR    0812 - Chart review complete. Appreciate weekend CM faxing referral. CM contacted [facility] intake @ [phone]. They have received referral and will review today.     IPR still following, will not accept/start auth until HD chair is confirmed.     0928 - [facility] intake called back, they are working patient up for the [facility] office. CM provided additional information requested. HARINI inquiring if patient is SHAHRAM on CKD or ESRD. CM shared latest nephro note from 06/30, will fax additional clarifying clinicals as they become available.     1137 - IPR at [facility], will start auth once therapy notes become available.     1404 - Patient approved for outpatient HD. Info added to AVS, awaiting acceptance letter from [facility].     Facility: [facility]   Address: [address] [place] [id number]   Phone: [phone]  Chair time: MWF @ 3:30pm   Pt to report at 2:45pm on first day of HD with ID and insurance card.     CM will hand off to IPR to keep  HARINI in communication about d/c from IPR.     Tatum Caldera, MSW  Care Management  [facility]  [id number]

## 2024-07-01 NOTE — PROGRESS NOTES
Name of Procedure: Permcath placement     Sedation medications given:      Versed: 1 mg     Fentanyl:  25 mcg     Sedation Tolerated: Well     Procedure and sedation times are the same.      Sedation Start: 1038  Sedation End: 1044     Vital Signs:  Stable     Fluids Removed: None     Samples sent to lab: None     Any complications related to procedure: none identified at this time    This patient is at an increased risk of falling because they have received sedating medications. Please evaluate and implement fall precautions/fall prevention practices as appropriate.      Verbal report given to patient's primary nurse.

## 2024-07-01 NOTE — PROGRESS NOTES
Noted cardiology consult order.  However, no prior known contact with [facility].  [facility] on for unassigned consults. Please call S.

## 2024-07-01 NOTE — PROGRESS NOTES
Hospitalist Progress Note    NAME:   Gerald Yuen   : 1965   MRN: [id number]     Date/Time: 2024 3:44 PM  Patient PCP: Bro Lynch APRN - NP    Estimated discharge date:7/3  Barriers: Nephrology clearance, , new HD, changing to permanent cath      Assessment / Plan:    SHAHRAM on CKD, ?  Stage IV  -He was told in the past that his kidneys were weak but no recent baseline creatinine.  Suspect progression of underlying diabetic nephropathy   -Hold losartan/HCTZ  - renal ultrasound showed no acute abnormality  Appreciate nephrology consult  Status post temporary dialysis cath placement on , HD on ,   Planning biopsy next week if feasible to determine SHAHRAM on CKD versus CKD stage V  Will need outpatient dialysis center on discharge    -will check with nephrology whether planning permanent cath on Monday.  If so we will place order    Acute stroke involving anterior right body of corpus callosum/frontal lobe  Neurochecks  Appreciate neurology consult  Stroke pathway  Lipid panel, A1c 6.9  MRA head and neck unremarkable  Echo 60 to 65% EF, no thrombus mentioned on report  Recommended starting on dual antiplatelets aspirin 81 and Plavix 75 mg x 21 days and then aspirin monotherapy  Avoid PPI and NSAIDs while on DAPT. Okay to take Tums if needed for acid reflux while on DAPT.   Blood pressure goal less than 140/90 t  PT OT eval-IPR  Speech eval regular diet  Will need outpatient event monitor-cardiology consult placed for the       overnight events  Syncopal episode while in toilet  Repeat CT head stable  Rapid EEG no seizure burden  Repeat neurology consult pending  ?  Orthostatic versus vasovagal event given patient no dialysis and received dialysis x 2  Check orthostatics  Will hold off adding new blood pressure medications today  Blood pressure 148/87      HTN  -Holding losartan HCTZ as above  -IV hydralazine as needed  Started on amlodipine 10 mg  May add blood pressure   --    PHOS 2.5* 2.2* 2.4* 2.2*   BILITOT 0.5  --   --   --    AST 16  --   --   --    ALT 12  --   --   --          Signed: Stefany Kim MD

## 2024-07-01 NOTE — PROGRESS NOTES
07/01/24        I have been asked to see this patient by Stefany Kim MD  for advice/opinion re: -----                                                        Assessment:          SHAHRAM on CKD  CKD stage IV or progressive CKD 5  Diabetic nephropathy  Generalized weakness  Diabetic neuropathy  Hypertension associate with CKD   Discussion:     Had refused HD previously.  Had HD 6/28 and 6/29  After a thorough review of his chart, I see where he has carried a diagnosis of CKD-5 and has been seen by Dr. Snyder.  It looks as if he was supposed to get an AVF last year but did not.  I suspect that he has ESRD.  I have a call in to Dr. Snyder's office  UPCR 3.3 g.  Clinical picture is suggestive of diabetic nephropathy in which kidney size is on the larger side.  Spoke to him about dialysis and need for dialysis.  He is agreeable to continue dialysis  Agree with holding losartan hydrochlorothiazide  Appreciate neurology input  Plan:   Temporary hemodialysis catheter placement.  For permcath today.  HD today and then MWF  I suspect he is ESRD.  Would not biopsy at this point.  Placement in outpatient dialysis center by case management      DW pt                            Signed By: Memo Gaspar MD     July 1, 2024           Consult Date: 7/1/2024        Subjective   Seen and examined.    \"I feel fine.\"  No N/V.  No dyspnea.          PMH:  Past Medical History:   Diagnosis Date    Cerebral artery occlusion with cerebral infarction (HCC)     Chronic kidney disease     Diabetes mellitus (HCC)      PSH:  Past Surgical History:   Procedure Laterality Date    IR NONTUNNELED VASCULAR CATHETER  6/28/2024    IR NONTUNNELED VASCULAR CATHETER 6/28/2024 Iqra Rolle APRN - NP [facility] RAD ANGIO IR       Social history:   Social History     Tobacco Use    Smoking status: Never     Passive exposure:

## 2024-07-01 NOTE — PROGRESS NOTES
Physical Therapy: Defer    Chart reviewed in preparation for PT intervention. Patient off the floor for hemodialysis and unavailable at this time. Will continue to monitor status and initiate therapy when appropriate.    Thank you,  Emile Irving, PT, DPT

## 2024-07-01 NOTE — FLOWSHEET NOTE
Primary RN SBAR: Valeria Marino, RN  Patient Education provided: Procedural   Incapacitated Nurse edu. provided: Yes  Preferred Education method and Primary language: verbal/ english  Hospital associated wait time; reason: IR wait time 1.5 hours  Hepatitis B Surface Ag   Date/Time Value Ref Range Status   06/28/2024 04:47 AM <0.10 Index Final     Hep B S Ag Interp   Date/Time Value Ref Range Status   06/28/2024 04:47 AM Negative NEG   Final     Hep B S Ab   Date/Time Value Ref Range Status   06/28/2024 04:47 AM <3.10 mIU/mL Final     Hep B S Ab Interp   Date/Time Value Ref Range Status   06/28/2024 04:47 AM NONREACTIVE NR   Final     Comment:     (NOTE)  The [employer] Anti-HBs2 assay is traceable to the World Health   Organization (WHO) Hepatitis B Immunoglobulin 1st International   Reference Preparation (1977). Samples with a calculated value of 10   mIU/mL or greater are considered reactive (protective) in accordance   with the CDC guidelines. The accepted criteria for immunity to HBV is   anti-HBs activity greater than or equal to 10 mIU/mL, as defined by   the WHO International Reference Preparation.  Assay performance has not been established in pregnant women,   patients who are immunosuppressed or immunocompromised, nor have   performance characteristics been established in conjunction with   other 's assays for specific HBV serologic markers. This   assay does not differentiate between vaccine induced immune response   and a response due to infection with HBV. Passively acquired anti-HBs   may be identified following patient transfusion, receipt of   immunoglobulin products, etc.       Pre HD   07/01/24 1118   Observations & Evaluations   Level of Consciousness 0   Oriented X 4   Heart Rhythm Regular   Respiratory Quality/Effort Unlabored   O2 Device None (Room air)   Bilateral Breath Sounds Clear   Skin Color Hyperpigmentation   Skin Condition/Temp Dry   Abdomen Inspection Soft   Bowel  Taken Fluid bolus;Ultrafiltration goal decreased;Ultrafiltration stopped   Physician Notified Yes   Patient Disposition Return to room       Primary RN SBAR: Valeria Marino RN  Comments: 1 Hypotensive episode and relief with 300 cc flush of NS and UF turned off.

## 2024-07-01 NOTE — PROGRESS NOTES
0940- Patient arrived to IR Recovery AxOx4. Lucrecia has no complaints of pain and has been informed of intent to exchange Anastacio for Permcath. Dr. Parrish Mello to bedside to obtain consent.

## 2024-07-02 LAB
ALBUMIN SERPL-MCNC: 2.5 G/DL (ref 3.5–5)
ANION GAP SERPL CALC-SCNC: 4 MMOL/L (ref 5–15)
BASOPHILS # BLD: 0 K/UL (ref 0–0.1)
BASOPHILS NFR BLD: 0 % (ref 0–1)
BUN SERPL-MCNC: 35 MG/DL (ref 6–20)
BUN/CREAT SERPL: 6 (ref 12–20)
CALCIUM SERPL-MCNC: 8.3 MG/DL (ref 8.5–10.1)
CHLORIDE SERPL-SCNC: 103 MMOL/L (ref 97–108)
CO2 SERPL-SCNC: 31 MMOL/L (ref 21–32)
CREAT SERPL-MCNC: 5.78 MG/DL (ref 0.7–1.3)
DIFFERENTIAL METHOD BLD: ABNORMAL
EOSINOPHIL # BLD: 0.2 K/UL (ref 0–0.4)
EOSINOPHIL NFR BLD: 3 % (ref 0–7)
ERYTHROCYTE [DISTWIDTH] IN BLOOD BY AUTOMATED COUNT: 12.4 % (ref 11.5–14.5)
GLUCOSE BLD STRIP.AUTO-MCNC: 150 MG/DL (ref 65–117)
GLUCOSE BLD STRIP.AUTO-MCNC: 154 MG/DL (ref 65–117)
GLUCOSE BLD STRIP.AUTO-MCNC: 154 MG/DL (ref 65–117)
GLUCOSE BLD STRIP.AUTO-MCNC: 205 MG/DL (ref 65–117)
GLUCOSE SERPL-MCNC: 200 MG/DL (ref 65–100)
HCT VFR BLD AUTO: 28.2 % (ref 36.6–50.3)
HGB BLD-MCNC: 9 G/DL (ref 12.1–17)
IMM GRANULOCYTES # BLD AUTO: 0 K/UL (ref 0–0.04)
IMM GRANULOCYTES NFR BLD AUTO: 0 % (ref 0–0.5)
LYMPHOCYTES # BLD: 1.8 K/UL (ref 0.8–3.5)
LYMPHOCYTES NFR BLD: 27 % (ref 12–49)
MCH RBC QN AUTO: 28.5 PG (ref 26–34)
MCHC RBC AUTO-ENTMCNC: 31.9 G/DL (ref 30–36.5)
MCV RBC AUTO: 89.2 FL (ref 80–99)
MONOCYTES # BLD: 0.6 K/UL (ref 0–1)
MONOCYTES NFR BLD: 9 % (ref 5–13)
NEUTS SEG # BLD: 4.2 K/UL (ref 1.8–8)
NEUTS SEG NFR BLD: 61 % (ref 32–75)
NRBC # BLD: 0 K/UL (ref 0–0.01)
NRBC BLD-RTO: 0 PER 100 WBC
PHOSPHATE SERPL-MCNC: 3.8 MG/DL (ref 2.6–4.7)
PLATELET # BLD AUTO: 211 K/UL (ref 150–400)
PMV BLD AUTO: 9.9 FL (ref 8.9–12.9)
POTASSIUM SERPL-SCNC: 3.9 MMOL/L (ref 3.5–5.1)
RBC # BLD AUTO: 3.16 M/UL (ref 4.1–5.7)
SERVICE CMNT-IMP: ABNORMAL
SODIUM SERPL-SCNC: 138 MMOL/L (ref 136–145)
VIT B6 SERPL-MCNC: 12.2 UG/L (ref 3.4–65.2)
WBC # BLD AUTO: 6.9 K/UL (ref 4.1–11.1)

## 2024-07-02 PROCEDURE — 85025 COMPLETE CBC W/AUTO DIFF WBC: CPT

## 2024-07-02 PROCEDURE — 6370000000 HC RX 637 (ALT 250 FOR IP): Performed by: INTERNAL MEDICINE

## 2024-07-02 PROCEDURE — 36415 COLL VENOUS BLD VENIPUNCTURE: CPT

## 2024-07-02 PROCEDURE — 6370000000 HC RX 637 (ALT 250 FOR IP)

## 2024-07-02 PROCEDURE — 97530 THERAPEUTIC ACTIVITIES: CPT

## 2024-07-02 PROCEDURE — 1100000003 HC PRIVATE W/ TELEMETRY

## 2024-07-02 PROCEDURE — 2580000003 HC RX 258: Performed by: STUDENT IN AN ORGANIZED HEALTH CARE EDUCATION/TRAINING PROGRAM

## 2024-07-02 PROCEDURE — 80069 RENAL FUNCTION PANEL: CPT

## 2024-07-02 PROCEDURE — 97112 NEUROMUSCULAR REEDUCATION: CPT

## 2024-07-02 PROCEDURE — 82962 GLUCOSE BLOOD TEST: CPT

## 2024-07-02 PROCEDURE — 97535 SELF CARE MNGMENT TRAINING: CPT

## 2024-07-02 RX ORDER — MIDODRINE HYDROCHLORIDE 5 MG/1
2.5 TABLET ORAL
Status: DISCONTINUED | OUTPATIENT
Start: 2024-07-02 | End: 2024-07-03

## 2024-07-02 RX ADMIN — Medication 5000 UNITS: at 09:11

## 2024-07-02 RX ADMIN — SODIUM CHLORIDE, PRESERVATIVE FREE 10 ML: 5 INJECTION INTRAVENOUS at 20:45

## 2024-07-02 RX ADMIN — ATORVASTATIN CALCIUM 40 MG: 40 TABLET, FILM COATED ORAL at 20:45

## 2024-07-02 RX ADMIN — AMLODIPINE BESYLATE 10 MG: 5 TABLET ORAL at 09:11

## 2024-07-02 RX ADMIN — ASPIRIN 81 MG: 81 TABLET, CHEWABLE ORAL at 09:11

## 2024-07-02 RX ADMIN — SODIUM CHLORIDE, PRESERVATIVE FREE 10 ML: 5 INJECTION INTRAVENOUS at 09:13

## 2024-07-02 RX ADMIN — INSULIN LISPRO 1 UNITS: 100 INJECTION, SOLUTION INTRAVENOUS; SUBCUTANEOUS at 11:38

## 2024-07-02 RX ADMIN — CLOPIDOGREL BISULFATE 75 MG: 75 TABLET ORAL at 09:11

## 2024-07-02 RX ADMIN — MIDODRINE HYDROCHLORIDE 2.5 MG: 5 TABLET ORAL at 16:10

## 2024-07-02 ASSESSMENT — PAIN SCALES - GENERAL: PAINLEVEL_OUTOF10: 0

## 2024-07-02 NOTE — PROGRESS NOTES
Comprehensive Nutrition Assessment    Type and Reason for Visit:  Reassess    Nutrition Recommendations/Plan:   5 carb choice/2g Na diet  Glucerna BID     Malnutrition Assessment:  Malnutrition Status:  At risk for malnutrition (Comment) (07/02/24 1529)      Nutrition Assessment:    Chart reviewed; medically noted for ESRD and new HD, HTN, DM, and stroke. Patient reports an improved appetite since admission; poor intake PTA. Potassium and phos WNL; episodes of hyperglycemia noted. Adjusted to carb controlled diet and discontinued K+/phos restrictions. Patient agreeable to trial of Glucerna BID (prefers strawberry). Encouraged intake of meals. Insurance authorization pending for IPR.      Patient Vitals for the past 120 hrs:   PO Meals Eaten (%)   06/28/24 0918 76 - 100%     Nutrition Related Findings:    K+ 3.9, Phos 3.5, -154-200   BM 6/30   Norvasc, Atorvastatin, Plavix, Humalog, Vitamin D   Wound Type: None       Current Nutrition Intake & Therapies:    Average Meal Intake: %     ADULT DIET; Regular; 5 carb choices (75 gm/meal); Low Sodium (2 gm)  ADULT ORAL NUTRITION SUPPLEMENT; Breakfast, Dinner; Diabetic Oral Supplement    Anthropometric Measures:  Height: 188 cm (6' 2\")  Ideal Body Weight (IBW): 190 lbs (86 kg)       Current Body Weight: 83 kg (182 lb 15.7 oz),   IBW.    Current BMI (kg/m2): 23.5                          BMI Categories: Normal Weight (BMI 18.5-24.9)    Estimated Daily Nutrient Needs:  Energy Requirements Based On: Formula  Weight Used for Energy Requirements: Current  Energy (kcal/day): 2249 kcals (BMR x 1.3AF)  Weight Used for Protein Requirements: Current  Protein (g/day): 100g (1.2 g/kg bw)  Method Used for Fluid Requirements: Standard Renal  Fluid (ml/day): 1800 mL or per MD    Nutrition Diagnosis:   Inadequate protein-energy intake related to  (decreased appetite) as evidenced by poor intake prior to admission    Nutrition Interventions:   Food and/or Nutrient Delivery:

## 2024-07-02 NOTE — PROGRESS NOTES
Progress Note      7/2/2024 7:48 AM  NAME: Gerald Yuen   MRN:  [id number]   Admit Diagnosis: CKD (chronic kidney disease) [N18.9]        Primary Cardiologist: VIKA  Physician Requesting consult: Dr Kim         Assessment:     Problem list:   SHAHRAM over CKD, ESRD - now on HD since 6/28   Acute CVA, anterior right body of corpus callosum / frontal lobe   Syncope 6/29 - while on toilet, previous history of syncopal episode  Diabetes  Hypertension  Hyperlipidemia     Lives with brother, currently not working, previously working as a      CT head 6/29  No acute intracranial hemorrhage, mass or territorial infarct.  Nonvisualization of patient's known right corpus callosal body\frontal lobe  acute infarct, seen on prior MR dated June 27, 2024.     MRI head  - 6/27  Small foci of acute/subacute infarction involving the anterior right body of the  corpus callosum/frontal lobe. Background of chronic white matter disease and  small chronic infarctions as above. Numerous chronic microhemorrhages may  indicate cerebral amyloid angiopathy.     Carotid duplex: Mild Carotid artery disease      Echo in 6/2024-     Left Ventricle: Normal left ventricular systolic function with a visually estimated EF of 60 - 65%. Left ventricle size is normal. Normal wall thickness. Normal wall motion.            Recommendations:     Continue aspirin and Plavix for CVA per neuro  Continue Lipitor 40 mg daily  Continue amlodipine 10 mg daily  Add metoprolol if needed for BP     Renal following for HD  Continue to monitor on telemetry  Event monitor as outpatient to evaluate for arrhythmia, follow-up with clinic for the same     Ambulate, monitor orthostatic symptoms   Fu in clinic after discharge     Thank you for this consult and allowing me to take part in this patients care.  Please call with questions.          [x]        High complexity decision making was performed    Subjective:     HPI:   No CP or SOB  NO PALPITATIONS  sodium chloride  200 mL Irrigation Once              Desean Villanueva MD

## 2024-07-02 NOTE — PROGRESS NOTES
End of Shift Note     Bedside shift change report given to , RN  (oncoming nurse) by VALERIA Laboy .          Shift worked: Nights    Shift summary and any significant changes:     Patient had tunneled HD cath placed 7/1  CM following for DC needs  Cardiology came to bedside needs 30 day event monitor for dc   Nephro following         Concerns for physician to address:  None   Zone phone for oncoming shift:   [phone]      Patient Information  Gerald Yuen  58 y.o.  6/25/2024 10:54 PM by Matthew Hart MD. Gerald Yuen was admitted from [facility]     Problem List       Patient Active Problem List     Diagnosis Date Noted    CKD (chronic kidney disease) 06/26/2024      Past Medical History        Past Medical History:   Diagnosis Date    Cerebral artery occlusion with cerebral infarction (HCC)      Chronic kidney disease      Diabetes mellitus (HCC)              Core Measures:  CVA: yes  CHF: no  PNA: no     Activity:  Level of Assistance: Other (Comment) (bedrst)  Number times ambulated in hallways past shift: 0  Number of times OOB to chair past shift: 0     Cardiac:   Cardiac Monitoring: yes, SR     Access:   Current line(s): PIV, HD catheter right neck     Respiratory:   O2 Device: None (Room air)     GI:  Last BM (including prior to admit): 06/26/24  Current diet:  ADULT DIET; Regular; Low Fat/Low Chol/High Fiber/2 gm Na; Low Potassium (Less than 3000 mg/day); Low Phosphorus (Less than 1000 mg)  DIET ONE TIME MESSAGE;  Diet NPO  Tolerating current diet: Yes     Pain Management:   Patient states pain is manageable on current regimen: yes     Skin:  Sanket Scale Score: 18  Interventions: N/A  Pressure injury: no    Patient Safety:  Fall Score: Gilbert Total Score: 85  Interventions: bed alarm  Self-release roll belt: No  Dexterity to release roll belt: yes   (must document dexterity  here by stating Yes or No here, otherwise this is a restraint and must follow restraint documentation policy.)     DVT

## 2024-07-02 NOTE — PROGRESS NOTES
End of Shift Note    Bedside shift change report given to Emile RN  (oncoming nurse) by Jennifer Hogue RN .        Shift worked:  7a-7p   Shift summary and any significant changes:    No acute changes  PT/OT came to bedside  Positive for orthostatic vitals while working with therapy  HD catheter dressing changed      Concerns for physician to address:  None   Zone phone for oncoming shift:   [phone]     Patient Information  Gerald Yuen  58 y.o.  6/25/2024 10:54 PM by Matthew Hart MD. Gerald Yuen was admitted from [facility]    Problem List  Patient Active Problem List    Diagnosis Date Noted    Cerebral artery occlusion with cerebral infarction (HCC) 07/01/2024    Acute alteration in mental status 07/01/2024    Convulsive syncope 07/01/2024    Cerebrovascular accident (CVA) ([facility]) 07/01/2024    CKD (chronic kidney disease) 06/26/2024     Past Medical History:   Diagnosis Date    Cerebral artery occlusion with cerebral infarction ([facility])     Chronic kidney disease     Diabetes mellitus ([facility])        Core Measures:  CVA: yes  CHF: no  PNA: no    Activity:  Level of Assistance: Moderate assist, patient does 50-74%  Number times ambulated in hallways past shift: 0  Number of times OOB to chair past shift: 0    Cardiac:   Cardiac Monitoring: yes, SR    Access:   Current line(s): PIV, HD catheter right neck    Respiratory:   O2 Device: None (Room air)    GI:  Last BM (including prior to admit): 06/30/24  Current diet:  ADULT DIET; Regular; 5 carb choices (75 gm/meal); Low Sodium (2 gm)  ADULT ORAL NUTRITION SUPPLEMENT; Breakfast, Dinner; Diabetic Oral Supplement  Tolerating current diet: Yes    Pain Management:   Patient states pain is manageable on current regimen: yes    Skin:  Sanket Scale Score: 17  Interventions: N/A  Pressure injury: no    Patient Safety:  Fall Score: Gilbert Total Score: 85  Interventions: bed alarm  Self-release roll belt: No  Dexterity to release roll belt: yes   (must document dexterity  here

## 2024-07-02 NOTE — PROGRESS NOTES
Hospitalist Progress Note    NAME:   Gerald Yuen   : 1965   MRN: [id number]     Date/Time: 2024 3:56 PM  Patient PCP: Marcy Padilla MD    Estimated discharge date:7/3  Barriers: Need placement, orthostatic    Assessment / Plan:    SHAHRAM on CKD, ?  Stage IV  -He was told in the past that his kidneys were weak but no recent baseline creatinine.  Suspect progression of underlying diabetic nephropathy   -Hold losartan/HCTZ  - renal ultrasound showed no acute abnormality  Appreciate nephrology consult  Status post temporary dialysis cath placement on , HD on ,   Planning biopsy next week if feasible to determine SHAHRAM on CKD versus CKD stage V  Will need outpatient dialysis center on discharge    -s/p permcath placement     Orthostatic hypotension  Start patient on midodrine    Acute stroke involving anterior right body of corpus callosum/frontal lobe  Neurochecks  Appreciate neurology consult  Stroke pathway  Lipid panel, A1c 6.9  MRA head and neck unremarkable  Echo 60 to 65% EF, no thrombus mentioned on report  Recommended starting on dual antiplatelets aspirin 81 and Plavix 75 mg x 21 days and then aspirin monotherapy  Avoid PPI and NSAIDs while on DAPT. Okay to take Tums if needed for acid reflux while on DAPT.   Blood pressure goal less than 140/90 t  PT OT eval-IPR  Speech eval regular diet  Will need outpatient event monitor-cardiology consult placed for the       overnight events  Syncopal episode while in toilet  Repeat CT head stable  Rapid EEG no seizure burden  Repeat neurology consult pending  ?  Orthostatic versus vasovagal event given patient no dialysis and received dialysis x 2  Check orthostatics  Will hold off adding new blood pressure medications today  Blood pressure 148/87      HTN  -Holding losartan HCTZ as above  -IV hydralazine as needed  Started on amlodipine 10 mg  May add blood pressure medication as needed  Blood pressure improved    Vitamin D

## 2024-07-02 NOTE — CARE COORDINATION
Transition of Care Plan:     RUR: 16% (moderate RUR)   Prior Level of Functioning: Independent  Disposition: IPR -HDH-P  and then home with new outpatient HD  If SNF or IPR: Date FOC offered: 06/27/24  Date FOC received: 06/27/24  Accepting facility: [facility] accepted  Date authorization started with reference number: Facility started auth 07/02  Date authorization received and expires: Auth pending  Follow up appointments: PCP/Specialists as indicated  DME needed: None at this time  Transportation at discharge: Family to transport  IM/IMM Medicare/ letter given: N/a  Is patient a  and connected with [facility]? N/a              If yes, was [facility] transfer form completed and [facility] notified? N/a  Caregiver Contact: Traci Yuen; parent; [phone]  Discharge Caregiver contacted prior to discharge? Pt to contact  Care Conference needed? Not at this time  Barriers to discharge: medical clearance, clinical improvement, auth for IPR    0817 - HDH-P has started auth, pending at this time. CM following. HD chair confirmed yesterday, info added to AVS:     Facility: [facility]   Address: [address] [place] [address]   Phone: [phone]  Chair time: MWF @ 3:30pm   Pt to report at 2:45pm on first day of HD with ID and insurance card.     0956 - Gave HARINI letter of acceptance to patient. Copy on chart. Explained outpatient HD process, updated pt on auth pending for IPR.     1510 - HDH-P requesting updated PT/OT notes. OT saw today. CM reached out to PT.    AVELINO Sanchez  Care Management  [facility]  [phone]

## 2024-07-02 NOTE — PROGRESS NOTES
Function Panel    Collection Time: 07/02/24  2:58 AM   Result Value Ref Range    Sodium 138 136 - 145 mmol/L    Potassium 3.9 3.5 - 5.1 mmol/L    Chloride 103 97 - 108 mmol/L    CO2 31 21 - 32 mmol/L    Anion Gap 4 (L) 5 - 15 mmol/L    Glucose 200 (H) 65 - 100 mg/dL    BUN 35 (H) 6 - 20 MG/DL    Creatinine 5.78 (H) 0.70 - 1.30 MG/DL    BUN/Creatinine Ratio 6 (L) 12 - 20      Est, Glom Filt Rate 11 (L) >60 ml/min/1.73m2    Calcium 8.3 (L) 8.5 - 10.1 MG/DL    Phosphorus 3.8 2.6 - 4.7 MG/DL    Albumin 2.5 (L) 3.5 - 5.0 g/dL   POCT Glucose    Collection Time: 07/02/24  6:33 AM   Result Value Ref Range    POC Glucose 154 (H) 65 - 117 mg/dL    Performed by: CHANDLER MONSIVAIS TUNNELED CVC PLACE WO SQ PORT/PUMP > 5 YEARS   Final Result   Technically successful exchange of a temporary hemodialysis catheter   for a tunneled Permacath with fluoroscopic guidance.             Electronically signed by RICHARD PEDROZA      Vascular duplex carotid bilateral   Final Result      CT HEAD WO CONTRAST   Final Result   No acute intracranial hemorrhage, mass or territorial infarct.   Nonvisualization of patient's known right corpus callosal body\frontal lobe   acute infarct, seen on prior MR dated June 27, 2024.         Electronically signed by Abhay Valentin MD      IR NONTUNNELED VASCULAR CATHETER > 5 YEARS   Final Result   Technically successful ultrasound guided placement of a right internal jugular   vein temporary dialysis catheter.  A post procedure chest x-ray is pending.         Electronically signed by MANN QUIROZ      XR CHEST PORTABLE   Final Result      Dialysis catheter in place.      Electronically signed by ELVIRA WOODS MD      MRA NECK WO CONTRAST   Final Result   No significant stenosis demonstrated.      Electronically signed by ELVIRA WOODS MD      MRA HEAD WO CONTRAST   Final Result   No significant abnormality.      Electronically signed by ELVIRA WOODS MD      MRI LUMBAR SPINE WO CONTRAST   Final Result

## 2024-07-02 NOTE — PLAN OF CARE
Problem: Occupational Therapy - Adult  Goal: By Discharge: Performs self-care activities at highest level of function for planned discharge setting.  See evaluation for individualized goals.  Description: FUNCTIONAL STATUS PRIOR TO ADMISSION:  Patient reports being independent with ADLs, IADLs and ambulation at baseline, but has declined over the past 3 months. He has had 6 falls in the past month due to BLE weakness.     HOME SUPPORT: Patient was living with his brother and elderly parents.     Occupational Therapy Goals:  Initiated 6/27/2024  1.  Patient will perform grooming standing at sink with Stand by Assist for 5 minutes within 7 day(s).  2.  Patient will perform lower body dressing with Stand by Assist within 7 day(s).  3.  Patient will perform toilet transfers with Stand by Assist  within 7 day(s).  4.  Patient will perform all aspects of toileting with Stand by Assist within 7 day(s).  5.  Patient will sponge bathing with Stand by Assist within 7 day(s).     Outcome: Progressing   OCCUPATIONAL THERAPY TREATMENT  Patient: Gerald Yuen (58 y.o. male)  Date: 7/2/2024  Primary Diagnosis: CKD (chronic kidney disease) [N18.9]       Precautions: Bed Alarm, Fall Risk                Chart, occupational therapy assessment, plan of care, and goals were reviewed.    ASSESSMENT  Patient continues to benefit from skilled OT services and is slowly progressing towards goals. Pt tolerated OT session fair. Continues to present with decreased activity tolerance and was limited by presumed orthostatic hypotension. Pt was orthostatic from supine>sitting with mild symptoms, but improved with time. Unable to obtain BP in standing, as pt quickly presented with decreased responsiveness with significant posterior lateral leaning, therefore returned to sitting with minimal improvement in symptoms, therefore returned to supine with improvement in alertness. Pt remains below his baseline and will benefit from IPR to maximize  standing)  Sitting - Dynamic: Good (unsupported);Fair (occasional) (initially good, with fair at end 2/2 decreased responsiveness post standing)  Standing: Impaired  Standing - Static: Fair;Poor;Constant support (initial fair, declining to poor with L lateral posterior leaning 2/2 decreased responsiveness)      ADL Intervention:                                            UE Dressing: Independent  UE Dressing Skilled Clinical Factors: doff/donning gown                                    Skin Care: Chlorhexidine wipes;Bath wipes              Pain Ratin/10   Pain Intervention(s):         Activity Tolerance:   Fair  and signs and symptoms of orthostatic hypotension  Please refer to the flowsheet for vital signs taken during this treatment.    After treatment:   Patient left in no apparent distress in bed, Call bell within reach, Bed/ chair alarm activated, and Side rails x3    COMMUNICATION/EDUCATION:   The patient's plan of care was discussed with: physical therapist and registered nurse    Patient Education  Education Given To: Patient  Education Provided: Role of Therapy;Plan of Care;Fall Prevention Strategies  Education Method: Verbal  Barriers to Learning: None  Education Outcome: Verbalized understanding;Continued education needed    Thank you for this referral.  Marilyn Silva OT  Minutes: 24

## 2024-07-02 NOTE — PLAN OF CARE
Problem: Physical Therapy - Adult  Goal: By Discharge: Performs mobility at highest level of function for planned discharge setting.  See evaluation for individualized goals.  Description: FUNCTIONAL STATUS PRIOR TO ADMISSION: Patient was independent and active without use of DME, though reports inactivity 2/2 weakness/low energy/decreased appetite over the last month.     HOME SUPPORT PRIOR TO ADMISSION: The patient lived with parents and brother but did not require assistance. Pt reports Dad requires assistance from Mom, brother had multiple surgeries and doesn't move very well.     Physical Therapy Goals  Initiated 6/26/2024  1.  Patient will move from supine to sit and sit to supine, scoot up and down, and roll side to side in bed with independence within 7 day(s).    2.  Patient will perform sit to stand with modified independence within 7 day(s).  3.  Patient will transfer from bed to chair and chair to bed with modified independence using the least restrictive device within 7 day(s).  4.  Patient will ambulate with modified independence for 50 feet with the least restrictive device within 7 day(s).   Outcome: Progressing     PHYSICAL THERAPY TREATMENT    Patient: Gerald Yuen (58 y.o. male)  Date: 7/2/2024  Diagnosis: CKD (chronic kidney disease) [N18.9] CKD (chronic kidney disease)      Precautions: Bed Alarm, Fall Risk                    ASSESSMENT:  Patient continues to benefit from skilled PT services and is progressing towards goals. Able to progress to out of bed to chair and multiple sit to stands with rolling walker with impaired coordination/stance on L LE for steps. Patient with impaired standing balance/posture with L lateral and posterior lean increasingly so with fatigue but did improve with multi-modal cues and repetitions.  Did note intermittent hyperflexion on L LE during stance. Patient with intermittent decreased responsiveness while standing that improves with rest and BP with slight  decrease in standing but generally stable while up. He is a high risk for falls and is quite unsteady in standing. Initially plops into chair but practiced sit to/from stands with focus on use of UEs to control descent for stand to sit and patient did improve with further repetitions. Continue to recommend IPR. Acute PT to follow.     PLAN:  Patient continues to benefit from skilled intervention to address the above impairments.  Continue treatment per established plan of care.    Recommend with staff: therapy recommendations for staff: Recommend mobility with staff assist x1 using gait belt and rolling walker.    Recommend for next PT session: standing balance, sit to stand transfers, and bed to bedside chair transfers, trial gait as tolerated with assist    Recommendation for discharge: (in order for the patient to meet his/her long term goals): Therapy 3 hours/day 5-7 days/week    Other factors to consider for discharge: high risk for falls and new HD patient    IF patient discharges home will need the following DME: continuing to assess with progress     SUBJECTIVE:   Patient stated, \"I wonder why my blood pressure does that.\" \"My left leg gives out\"    OBJECTIVE DATA SUMMARY:   Critical Behavior:  Orientation  Overall Orientation Status: Within Normal Limits  Orientation Level: Oriented X4  Cognition  Overall Cognitive Status: WFL  Safety Judgement: Decreased awareness of need for assistance    Functional Mobility Training:  Bed Mobility:  Bed Mobility Training  Bed Mobility Training: Yes  Interventions: Verbal cues;Tactile cues  Supine to Sit: Contact-guard assistance  Sit to Supine: Minimum assistance  Scooting: Stand-by assistance  Transfers:  Transfer Training  Transfer Training: Yes  Interventions: Verbal cues  Sit to Stand: Minimum assistance  Stand to Sit: Minimum assistance  Bed to Chair: Minimum assistance;Adaptive equipment  Balance:  Balance  Sitting: Impaired  Sitting - Static: Good

## 2024-07-03 LAB
ALBUMIN SERPL ELPH-MCNC: 3 G/DL (ref 2.9–4.4)
ALBUMIN SERPL-MCNC: 2.7 G/DL (ref 3.5–5)
ALBUMIN/GLOB SERPL: 1 (ref 0.7–1.7)
ALPHA1 GLOB SERPL ELPH-MCNC: 0.2 G/DL (ref 0–0.4)
ALPHA2 GLOB SERPL ELPH-MCNC: 1 G/DL (ref 0.4–1)
ANION GAP SERPL CALC-SCNC: 7 MMOL/L (ref 5–15)
B-GLOBULIN SERPL ELPH-MCNC: 0.9 G/DL (ref 0.7–1.3)
BASOPHILS # BLD: 0 K/UL (ref 0–0.1)
BASOPHILS NFR BLD: 1 % (ref 0–1)
BUN SERPL-MCNC: 54 MG/DL (ref 6–20)
BUN/CREAT SERPL: 8 (ref 12–20)
CALCIUM SERPL-MCNC: 8.7 MG/DL (ref 8.5–10.1)
CHLORIDE SERPL-SCNC: 106 MMOL/L (ref 97–108)
CO2 SERPL-SCNC: 25 MMOL/L (ref 21–32)
CREAT SERPL-MCNC: 6.87 MG/DL (ref 0.7–1.3)
DIFFERENTIAL METHOD BLD: ABNORMAL
EOSINOPHIL # BLD: 0.2 K/UL (ref 0–0.4)
EOSINOPHIL NFR BLD: 3 % (ref 0–7)
ERYTHROCYTE [DISTWIDTH] IN BLOOD BY AUTOMATED COUNT: 12.6 % (ref 11.5–14.5)
GAMMA GLOB SERPL ELPH-MCNC: 1.1 G/DL (ref 0.4–1.8)
GLOBULIN SER-MCNC: 3.2 G/DL (ref 2.2–3.9)
GLUCOSE BLD STRIP.AUTO-MCNC: 126 MG/DL (ref 65–117)
GLUCOSE BLD STRIP.AUTO-MCNC: 138 MG/DL (ref 65–117)
GLUCOSE BLD STRIP.AUTO-MCNC: 140 MG/DL (ref 65–117)
GLUCOSE BLD STRIP.AUTO-MCNC: 166 MG/DL (ref 65–117)
GLUCOSE BLD STRIP.AUTO-MCNC: 216 MG/DL (ref 65–117)
GLUCOSE SERPL-MCNC: 137 MG/DL (ref 65–100)
HCT VFR BLD AUTO: 28.7 % (ref 36.6–50.3)
HGB BLD-MCNC: 9.2 G/DL (ref 12.1–17)
IGA SERPL-MCNC: 237 MG/DL (ref 90–386)
IGG SERPL-MCNC: 1310 MG/DL (ref 603–1613)
IGM SERPL-MCNC: 7 MG/DL (ref 20–172)
IMM GRANULOCYTES # BLD AUTO: 0 K/UL (ref 0–0.04)
IMM GRANULOCYTES NFR BLD AUTO: 0 % (ref 0–0.5)
INTERPRETATION SERPL IEP-IMP: ABNORMAL
LYMPHOCYTES # BLD: 2 K/UL (ref 0.8–3.5)
LYMPHOCYTES NFR BLD: 26 % (ref 12–49)
M PROTEIN SERPL ELPH-MCNC: ABNORMAL G/DL
MCH RBC QN AUTO: 28.7 PG (ref 26–34)
MCHC RBC AUTO-ENTMCNC: 32.1 G/DL (ref 30–36.5)
MCV RBC AUTO: 89.4 FL (ref 80–99)
MONOCYTES # BLD: 0.6 K/UL (ref 0–1)
MONOCYTES NFR BLD: 8 % (ref 5–13)
NEUTS SEG # BLD: 4.6 K/UL (ref 1.8–8)
NEUTS SEG NFR BLD: 62 % (ref 32–75)
NRBC # BLD: 0 K/UL (ref 0–0.01)
NRBC BLD-RTO: 0 PER 100 WBC
PHOSPHATE SERPL-MCNC: 4.2 MG/DL (ref 2.6–4.7)
PLATELET # BLD AUTO: 231 K/UL (ref 150–400)
PMV BLD AUTO: 9.9 FL (ref 8.9–12.9)
POTASSIUM SERPL-SCNC: 4.5 MMOL/L (ref 3.5–5.1)
PROT SERPL-MCNC: 6.2 G/DL (ref 6–8.5)
RBC # BLD AUTO: 3.21 M/UL (ref 4.1–5.7)
SERVICE CMNT-IMP: ABNORMAL
SODIUM SERPL-SCNC: 138 MMOL/L (ref 136–145)
WBC # BLD AUTO: 7.4 K/UL (ref 4.1–11.1)

## 2024-07-03 PROCEDURE — 85025 COMPLETE CBC W/AUTO DIFF WBC: CPT

## 2024-07-03 PROCEDURE — 36415 COLL VENOUS BLD VENIPUNCTURE: CPT

## 2024-07-03 PROCEDURE — 82962 GLUCOSE BLOOD TEST: CPT

## 2024-07-03 PROCEDURE — 6360000002 HC RX W HCPCS

## 2024-07-03 PROCEDURE — 97530 THERAPEUTIC ACTIVITIES: CPT

## 2024-07-03 PROCEDURE — 80069 RENAL FUNCTION PANEL: CPT

## 2024-07-03 PROCEDURE — 6370000000 HC RX 637 (ALT 250 FOR IP): Performed by: INTERNAL MEDICINE

## 2024-07-03 PROCEDURE — 90935 HEMODIALYSIS ONE EVALUATION: CPT

## 2024-07-03 PROCEDURE — 1100000003 HC PRIVATE W/ TELEMETRY

## 2024-07-03 PROCEDURE — 6370000000 HC RX 637 (ALT 250 FOR IP)

## 2024-07-03 PROCEDURE — 2580000003 HC RX 258: Performed by: STUDENT IN AN ORGANIZED HEALTH CARE EDUCATION/TRAINING PROGRAM

## 2024-07-03 PROCEDURE — 97110 THERAPEUTIC EXERCISES: CPT

## 2024-07-03 PROCEDURE — 6360000002 HC RX W HCPCS: Performed by: INTERNAL MEDICINE

## 2024-07-03 RX ORDER — MIDODRINE HYDROCHLORIDE 5 MG/1
5 TABLET ORAL
Status: DISCONTINUED | OUTPATIENT
Start: 2024-07-04 | End: 2024-07-05 | Stop reason: HOSPADM

## 2024-07-03 RX ORDER — HEPARIN SODIUM 1000 [USP'U]/ML
INJECTION, SOLUTION INTRAVENOUS; SUBCUTANEOUS
Status: COMPLETED
Start: 2024-07-03 | End: 2024-07-03

## 2024-07-03 RX ORDER — AMLODIPINE BESYLATE 5 MG/1
10 TABLET ORAL DAILY
Status: DISCONTINUED | OUTPATIENT
Start: 2024-07-03 | End: 2024-07-05 | Stop reason: HOSPADM

## 2024-07-03 RX ORDER — METOPROLOL SUCCINATE 25 MG/1
25 TABLET, EXTENDED RELEASE ORAL DAILY
Status: DISCONTINUED | OUTPATIENT
Start: 2024-07-03 | End: 2024-07-03

## 2024-07-03 RX ADMIN — ASPIRIN 81 MG: 81 TABLET, CHEWABLE ORAL at 12:00

## 2024-07-03 RX ADMIN — INSULIN LISPRO 1 UNITS: 100 INJECTION, SOLUTION INTRAVENOUS; SUBCUTANEOUS at 16:32

## 2024-07-03 RX ADMIN — CLOPIDOGREL BISULFATE 75 MG: 75 TABLET ORAL at 12:00

## 2024-07-03 RX ADMIN — MIDODRINE HYDROCHLORIDE 2.5 MG: 5 TABLET ORAL at 16:41

## 2024-07-03 RX ADMIN — SODIUM CHLORIDE, PRESERVATIVE FREE 10 ML: 5 INJECTION INTRAVENOUS at 21:19

## 2024-07-03 RX ADMIN — ATORVASTATIN CALCIUM 40 MG: 40 TABLET, FILM COATED ORAL at 21:19

## 2024-07-03 RX ADMIN — MIDODRINE HYDROCHLORIDE 2.5 MG: 5 TABLET ORAL at 12:00

## 2024-07-03 RX ADMIN — HEPARIN SODIUM 1300 UNITS: 1000 INJECTION INTRAVENOUS; SUBCUTANEOUS at 10:50

## 2024-07-03 RX ADMIN — MIDODRINE HYDROCHLORIDE 2.5 MG: 5 TABLET ORAL at 07:11

## 2024-07-03 RX ADMIN — Medication 5000 UNITS: at 12:00

## 2024-07-03 RX ADMIN — SODIUM CHLORIDE, PRESERVATIVE FREE 10 ML: 5 INJECTION INTRAVENOUS at 12:02

## 2024-07-03 RX ADMIN — AMLODIPINE BESYLATE 10 MG: 5 TABLET ORAL at 18:50

## 2024-07-03 NOTE — FLOWSHEET NOTE
Pre Dialysis:   07/03/24 0810   Treatment   Time On 0810   Time Off 1110   Treatment Goal 2L   Observations & Evaluations   Level of Consciousness 0   Heart Rhythm Regular   Respiratory Quality/Effort Unlabored   O2 Device None (Room air)   Skin Condition/Temp Dry;Warm   Abdomen Inspection Soft   Edema None   Vital Signs   BP (!) 168/60   Temp 98.2 °F (36.8 °C)   Pulse 82   Respirations 18   Pain Assessment   Pain Assessment None - Denies Pain   Technical Checks   Dialysis Machine No. 02   RO Machine Number [id number]   Dialyzer Lot No. [id number]   Tubing Lot Number [id number]   All Connections Secure Yes   NS Bag Yes   Saline Line Double Clamped Yes   Dialyzer Revaclear 300   Prime Volume (mL) 250 mL   ICEBOAT I;C;E;B;O;A;T   RO Machine Log Sheet Completed Yes   Machine Alarm Self Test Completed;Passed   Air Foam Detector Tested;Proper Function   Extracorporeal Circuit Tested for Integrity Yes   Machine Conductivity 13.9   Manual Ph 7.4   Bleach Test (Neg) Yes   Bath Temperature 98.6 °F (37 °C)   Treatment Initiation   Dialyze Hours 3   Treatment  Initiation Lewis Precautions maintained;Connections secured;Prime given;Venous Parameters set;Arterial Parameters set;Air foam detector engaged;Saline line double clamped;Revaclear Dialyzer   During Hemodialysis Assessment   Blood Flow Rate (ml/min) 300 ml/min   Arterial Pressure (mmHg) -120 mmHg   Venous Pressure (mmHg) 70   TMP 50      Access Visible Yes   Ultrafiltration Rate (ml/hr) 830 ml/hr   Ultrafiltration Removed (ml) 0 ml   Hemodialysis Central Access Right Neck   Placement Date/Time: 06/28/24 1435   Present on Admission/Arrival: No  Inserted by: JUSTIN Rolle NP  Insertion Practices: Chlorohexadine skin antisepsis;Hand hygiene;Maximal barrier precautions  Orientation: Right  Access Location: Neck  Catheter Size: 14....   Continued need for line? Yes   Site Assessment Clean, dry & intact   CVC Lumen Status Infusing   Venous Lumen Status Brisk blood

## 2024-07-03 NOTE — PROGRESS NOTES
x 2  Check orthostatics  Will hold off adding new blood pressure medications today  Blood pressure 148/87      HTN-labile with orthostasis especially with dialysis  -Holding losartan HCTZ as above  -IV hydralazine as needed  Started on amlodipine 10 mg  May add blood pressure medication as needed  Blood pressure improved    Vitamin D deficiency  Started on ergocalciferol     DM2    -Continue correctional sliding scale insulin     Generalized weakness, s/p fall x 2  -Denies head trauma.  -PT OT eval and treat IPR     Lumbosacral spondylosis  Lumbar spinal stenosis  Weakness bilateral lower extremities  MRI spine  No acute fracture.  2. Unchanged severe central spinal canal stenosis at L3-L4.  3. New moderate-severe left foraminal stenosis at L4-L5.             Medical Decision Making:   I personally reviewed labs: CBC, BMP  I personally reviewed imaging: CT  I personally reviewed EKG: Yes  Toxic drug monitoring:   Discussed case with: Patient RN  Daughter updated on 6/30        Code Status: Full  DVT Prophylaxis: Heparin  GI Prophylaxis:    Subjective:     Chief Complaint / Reason for Physician Visit  Patient seen with family at bedside overnight events noted.  Repeat CT reviewed.  Started on hemodialysis, permacath placed 7/1, inpatient rehab being arranged    Code as today during dialysis due to hypotension, canceled with improvement of symptoms rapidly.  Midodrine dose increased to 5 mg 3 times daily for orthostasis  Discharged to inpatient rehab delayed for now till BP stable      Objective:     VITALS:   Last 24hrs VS reviewed since prior progress note. Most recent are:  Patient Vitals for the past 24 hrs:   BP Temp Temp src Pulse Resp SpO2   07/03/24 1355 (!) 152/85 -- -- 93 -- --   07/03/24 1353 (!) 81/48 -- -- 93 -- --   07/03/24 1348 (!) 122/56 -- -- 91 -- --   07/03/24 1118 (!) 172/84 97.7 °F (36.5 °C) Oral 83 16 100 %   07/03/24 1048 (!) 154/75 -- -- 82 16 99 %   07/03/24 1045 (!) 163/82 97.8 °F (36.6 °C)    HCT 27.6* 28.2* 28.7*    211 231       Recent Labs     07/01/24  0217 07/02/24  0258 07/03/24  0214    138 138   K 4.7 3.9 4.5   * 103 106   CO2 27 31 25   GLUCOSE 188* 200* 137*   BUN 45* 35* 54*   CREATININE 6.76* 5.78* 6.87*   CALCIUM 7.9* 8.3* 8.7   PHOS 2.2* 3.8 4.2         Signed: Riley Villanueva MD    Total time 35 minutes

## 2024-07-03 NOTE — PROGRESS NOTES
End of Shift Note    Bedside shift change report given to (oncoming nurse) by Jennifer Hogue RN .        Shift worked:  Days   Shift summary and any significant changes:    Patient had dialysis with 1.5L removed. Code S called during dialysis. Full recollection of events in notes. Patient had a near syncopal episode once back in room from dialysis. MD made aware.    Concerns for physician to address:  None   Zone phone for oncoming shift:   [phone]     Patient Information  Gerald Yuen  58 y.o.  6/25/2024 10:54 PM by Matthew Hart MD. Gerald Yuen was admitted from [facility]    Problem List  Patient Active Problem List    Diagnosis Date Noted    Cerebral artery occlusion with cerebral infarction (HCC) 07/01/2024    Acute alteration in mental status 07/01/2024    Convulsive syncope 07/01/2024    Cerebrovascular accident (CVA) (HCC) 07/01/2024    CKD (chronic kidney disease) 06/26/2024     Past Medical History:   Diagnosis Date    Cerebral artery occlusion with cerebral infarction (HCC)     Chronic kidney disease     Diabetes mellitus ([facility])        Core Measures:  CVA: yes  CHF: no  PNA: no    Activity:  Level of Assistance: Minimal assist, patient does 75% or more  Number times ambulated in hallways past shift: 0  Number of times OOB to chair past shift: 0    Cardiac:   Cardiac Monitoring: yes, SR    Access:   Current line(s): PIV, HD catheter right neck    Respiratory:   O2 Device: None (Room air)    GI:  Last BM (including prior to admit): 06/30/24  Current diet:  ADULT DIET; Regular; 5 carb choices (75 gm/meal); Low Sodium (2 gm)  ADULT ORAL NUTRITION SUPPLEMENT; Breakfast, Dinner; Diabetic Oral Supplement  Tolerating current diet: Yes    Pain Management:   Patient states pain is manageable on current regimen: yes    Skin:  Sanket Scale Score: 18  Interventions: N/A  Pressure injury: no    Patient Safety:  Fall Score: Gilbert Total Score: 70  Interventions: bed alarm  Self-release roll belt: No  Dexterity to

## 2024-07-03 NOTE — CARE COORDINATION
Transition of Care Plan:     RUR: 16% (moderate RUR)   Prior Level of Functioning: Independent  Disposition: IPR -HDH-P  and then home with new outpatient HD  If SNF or IPR: Date FOC offered: 06/27/24  Date FOC received: 06/27/24  Accepting facility: [facility] accepted  Date authorization started with reference number: Facility started auth 07/02  Date authorization received and expires: Auth pending  Follow up appointments: PCP/Specialists as indicated  DME needed: None at this time  Transportation at discharge: Family to transport  IM/IMM Medicare/ letter given: N/a  Is patient a  and connected with [facility]? N/a              If yes, was [place] transfer form completed and [facility] notified? N/a  Caregiver Contact: Traci Yuen; parent; [phone]  Discharge Caregiver contacted prior to discharge? Pt to contact  Care Conference needed? Not at this time  Barriers to discharge: IPR auth, clinical improvement     0813 - Plan remains d/c to IPR at [facility]. Auth still pending at this time. CM requesting continued updates from facility. HD bed previously arranged:    Facility: [facility]   Address: [address] [place] [address]   Phone: [phone]  Chair time: MWF @ 3:30pm   Pt to report at 2:45pm on first day of HD with ID and insurance card.     1220 - Auth still pending at this time.     1558 - No longer clinically stable per MD given RRT earlier. CM following, auth still pending at this time.     AVELINO Sanchez  Care Management  [facility]  [phone]

## 2024-07-03 NOTE — PROGRESS NOTES
Rapid Response:  Responded to rapid response in dialysis, patient dropped BP and was gazing off to left side. Dialysis nurse gave fluid and called RRT. Upon arrival, BP already trending up. Patient was noted to be gazing to left side and not moving left arm or left leg. Dr. Sanchez at bedside and code stroke called.Blood sugar 138. Dialysis nurse rinsing patient back for code S CT scan.     1048 Patient now moving all extremities and no longer gazing to the left side. Answering all questions appropriately. Dr. Villanueva at bedside and cancelled code S at this time. Patient to go back to room 130. /75.    Extension [phone], RRT   Call if further assistance needed.

## 2024-07-03 NOTE — PLAN OF CARE
Problem: Safety - Adult  Goal: Free from fall injury  7/2/2024 2204 by Brii Fontaine RN  Outcome: Progressing  7/2/2024 1152 by Jennifer Hogue RN  Outcome: Progressing     Problem: Chronic Conditions and Co-morbidities  Goal: Patient's chronic conditions and co-morbidity symptoms are monitored and maintained or improved  7/2/2024 2204 by Brii Fontaine RN  Outcome: Progressing  7/2/2024 1152 by Jennifer Hogue RN  Outcome: Progressing     Problem: Physical Therapy - Adult  Goal: By Discharge: Performs mobility at highest level of function for planned discharge setting.  See evaluation for individualized goals.  Description: FUNCTIONAL STATUS PRIOR TO ADMISSION: Patient was independent and active without use of DME, though reports inactivity 2/2 weakness/low energy/decreased appetite over the last month.     HOME SUPPORT PRIOR TO ADMISSION: The patient lived with parents and brother but did not require assistance. Pt reports Dad requires assistance from Mom, brother had multiple surgeries and doesn't move very well.     Physical Therapy Goals  Initiated 6/26/2024  1.  Patient will move from supine to sit and sit to supine, scoot up and down, and roll side to side in bed with independence within 7 day(s).    2.  Patient will perform sit to stand with modified independence within 7 day(s).  3.  Patient will transfer from bed to chair and chair to bed with modified independence using the least restrictive device within 7 day(s).  4.  Patient will ambulate with modified independence for 50 feet with the least restrictive device within 7 day(s).   7/2/2024 1449 by Heather Fields, PT  Outcome: Progressing     Problem: Skin/Tissue Integrity  Goal: Absence of new skin breakdown  Description: 1.  Monitor for areas of redness and/or skin breakdown  2.  Assess vascular access sites hourly  3.  Every 4-6 hours minimum:  Change oxygen saturation probe site  4.  Every 4-6 hours:  If on nasal continuous positive airway pressure,

## 2024-07-03 NOTE — PROGRESS NOTES
Follow up from rapid response. Patient sitting up in bed and just finished eating his lunch. Patient moving all extremities and appropriate with conversation. Voices no complaints at this time. Discussed with patient's nurse Jennifer SHAW.

## 2024-07-03 NOTE — PROGRESS NOTES
Occupational Therapy    Chart reviewed. Pt with RR during HD for hypotension, changed to code stroke for L sided weakness and L gaze. Code stroke canceled and BP improved, with pt returned to room.    PT recently finished with pt, who continues to be symptomatically orthostatic and unable to progress OOB. OT will defer at this time 2/2 ongoing orthostatic hypotension. Will continue to follow.

## 2024-07-03 NOTE — PROGRESS NOTES
End of Shift Note    Bedside shift change report given to Jennifer RN  (oncoming nurse) by Brii Fontaine RN .        Shift worked:  nights   Shift summary and any significant changes:    No acute events  On call for dialysis today   Concerns for physician to address:  None   Zone phone for oncoming shift:        Patient Information  Gerald Yuen  58 y.o.  6/25/2024 10:54 PM by Matthew Hart MD. Gerald Yuen was admitted from [facility]    Problem List  Patient Active Problem List    Diagnosis Date Noted    Cerebral artery occlusion with cerebral infarction (HCC) 07/01/2024    Acute alteration in mental status 07/01/2024    Convulsive syncope 07/01/2024    Cerebrovascular accident (CVA) (HCC) 07/01/2024    CKD (chronic kidney disease) 06/26/2024     Past Medical History:   Diagnosis Date    Cerebral artery occlusion with cerebral infarction (HCC)     Chronic kidney disease     Diabetes mellitus (HCC)        Core Measures:  CVA: yes  CHF: no  PNA: no    Activity:  Level of Assistance: Minimal assist, patient does 75% or more  Number times ambulated in hallways past shift: 0  Number of times OOB to chair past shift: 0    Cardiac:   Cardiac Monitoring: yes, SR    Access:   Current line(s): PIV, HD catheter right neck    Respiratory:   O2 Device: None (Room air)    GI:  Last BM (including prior to admit): 06/30/24  Current diet:  ADULT DIET; Regular; 5 carb choices (75 gm/meal); Low Sodium (2 gm)  ADULT ORAL NUTRITION SUPPLEMENT; Breakfast, Dinner; Diabetic Oral Supplement  Tolerating current diet: Yes    Pain Management:   Patient states pain is manageable on current regimen: yes    Skin:  Sanket Scale Score: 19  Interventions: N/A  Pressure injury: no    Patient Safety:  Fall Score: Gilbert Total Score: 70  Interventions: bed alarm  Self-release roll belt: No  Dexterity to release roll belt: yes   (must document dexterity  here by stating Yes or No here, otherwise this is a restraint and must follow restraint

## 2024-07-03 NOTE — PLAN OF CARE
Problem: Physical Therapy - Adult  Goal: By Discharge: Performs mobility at highest level of function for planned discharge setting.  See evaluation for individualized goals.  Description: FUNCTIONAL STATUS PRIOR TO ADMISSION: Patient was independent and active without use of DME, though reports inactivity 2/2 weakness/low energy/decreased appetite over the last month.     HOME SUPPORT PRIOR TO ADMISSION: The patient lived with parents and brother but did not require assistance. Pt reports Dad requires assistance from Mom, brother had multiple surgeries and doesn't move very well.     Physical Therapy Goals  Initiated 6/26/2024  Re-assessment 7/3/2024: goals remain appropriate  1.  Patient will move from supine to sit and sit to supine, scoot up and down, and roll side to side in bed with independence within 7 day(s).    2.  Patient will perform sit to stand with modified independence within 7 day(s).  3.  Patient will transfer from bed to chair and chair to bed with modified independence using the least restrictive device within 7 day(s).  4.  Patient will ambulate with modified independence for 50 feet with the least restrictive device within 7 day(s).   Outcome: Progressing     PHYSICAL THERAPY TREATMENT: WEEKLY REASSESSMENT    Patient: Gerald Yuen (58 y.o. male)  Date: 7/3/2024  Primary Diagnosis: CKD (chronic kidney disease) [N18.9]       Precautions: Bed Alarm, Fall Risk                      ASSESSMENT :  Patient continues to benefit from skilled PT services and is slowly progressing towards goals. Pt encountered semi-reclined in bed in NAD, cleared by RN to participate in therapy (RRT called at HD, +s/s orthostatic hypotension, resolved). Pt performed LE HEP while semifowler in bed (heel slides, hip adduction + pillow, ankle pumps, modified SLRs) in prep for mobility. Supine > sit performed with CGA. Sit > stand from EOB with RW and CGA, cues for hand placement. Upon transfer to stand, pt without c/o  lightheadedness though noted with increased L lateral and posterior trunk lean, +orthostatic hypotension. Pt returned to sit with Min A, note with worsened L lateral trunk lean and decreased responsiveness, required Min A to return to supine. VSS upon transfer to supine.      07/03/24 1118 07/03/24 1348 07/03/24 1353   Vital Signs   BP (!) 172/84 (!) 122/56 (!) 81/48   MAP (Calculated) 113 78 59   MAP (mmHg) 108 76 (!) 58   Patient Position Semi fowlers Sitting Standing      07/03/24 1355   Vital Signs   BP (!) 152/85   MAP (Calculated) 107   MAP (mmHg) 104   Patient Position Supine         Patient's progression toward goals since last assessment: transfers with CG-Min A    Functional Outcome Measure:  The patient scored 17/24 on the [facility] outcome measure which is indicative of higher odds of discharging home with home health or need of SNF/IPR.          PLAN :  Goals have been updated based on progression since last assessment.  Patient continues to benefit from skilled intervention to address the above impairments.    Recommendations and Planned Interventions:   bed mobility training, transfer training, gait training, therapeutic exercises, neuromuscular re-education, patient and family training/education, and therapeutic activities    Frequency/Duration: Patient will be followed by physical therapy to address goals, PT Plan of Care: 5 times/week  per day/week to address goals.        Recommendation for discharge: (in order for the patient to meet his/her long term goals): Therapy 3 hours/day 5-7 days/week    Other factors to consider for discharge: patient's current support system is unable to meet their requirements for physical assistance, high risk for falls, not safe to be alone, and concern for safely navigating or managing the home environment    IF patient discharges home will need the following DME: rolling walker       SUBJECTIVE:   Patient stated “I just want to go home.”    OBJECTIVE DATA SUMMARY:

## 2024-07-03 NOTE — PROGRESS NOTES
(APRESOLINE) injection 10 mg  10 mg IntraVENous Q6H PRN Maricarmen Weiss MD        sodium chloride flush 0.9 % injection 5-40 mL  5-40 mL IntraVENous 2 times per day Matthew Hart MD   10 mL at 07/02/24 2045    sodium chloride flush 0.9 % injection 5-40 mL  5-40 mL IntraVENous PRN Matthew Hart MD        0.9 % sodium chloride infusion   IntraVENous PRN Matthew Hart MD        acetaminophen (TYLENOL) tablet 650 mg  650 mg Oral Q6H PRN Matthew Hart MD        Or    acetaminophen (TYLENOL) suppository 650 mg  650 mg Rectal Q6H PRN Matthew Hart MD        insulin lispro (HUMALOG,ADMELOG) injection vial 0-4 Units  0-4 Units SubCUTAneous TID WC Mp Coleman MD   1 Units at 07/02/24 1138    insulin lispro (HUMALOG,ADMELOG) injection vial 0-4 Units  0-4 Units SubCUTAneous Nightly Mp Coleman MD        glucose chewable tablet 16 g  4 tablet Oral PRN Mp Coleman MD        dextrose bolus 10% 125 mL  125 mL IntraVENous PRN Mp Coleman MD        Or    dextrose bolus 10% 250 mL  250 mL IntraVENous PRN Mp Coleman MD        glucagon injection 1 mg  1 mg SubCUTAneous PRN Mp Coleman MD        dextrose 10 % infusion   IntraVENous Continuous PRN Mp Coleman MD         Facility-Administered Medications Ordered in Other Encounters   Medication Dose Route Frequency Provider Last Rate Last Admin    heparin 2 units/mL solution in 0.9% sodium chloride  200 mL Irrigation Once Parrish Mello MD   Stopped at 07/01/24 1053         Review of Systems:  Pertinent items are noted in the History of Present Illness.     Objective     Vital signs for last 24 hours:  /79   Pulse 83   Temp 98.2 °F (36.8 °C)   Resp 18   Ht 1.88 m (6' 2\")   Wt 83 kg (182 lb 15.7 oz)   SpO2 99%   BMI 23.49 kg/m²         Recent Results (from the past 24 hour(s))   POCT Glucose    Collection Time: 07/02/24 11:10 AM   Result Value Ref Range    POC Glucose 205 (H) 65 - 117 mg/dL    Performed by: Raul Keller PCT    POCT  recorded.  No intake/output data recorded.     Current Shift: No intake/output data recorded.  Last 3 Shifts: No intake/output data recorded.  Physical Exam  Vitals and nursing note reviewed.   Constitutional:       Appearance: Normal appearance.   HENT:      Head: Normocephalic and atraumatic.      Nose: Nose normal.      Mouth/Throat:      Mouth: Mucous membranes are moist.   Cardiovascular:      Rate and Rhythm: Normal rate.      Pulses: Normal pulses.      Heart sounds: Normal heart sounds.   Pulmonary:      Effort: Pulmonary effort is normal.   Abdominal:      General: Abdomen is flat.   Musculoskeletal:      Cervical back: Neck supple.      Right lower leg: No edema.      Left lower leg: No edema.   Neurological:      General: No focal deficit present.      Mental Status: He is oriented to person, place, and time.          Data Review:   Recent Results (from the past 24 hour(s))   POCT Glucose    Collection Time: 07/02/24 11:10 AM   Result Value Ref Range    POC Glucose 205 (H) 65 - 117 mg/dL    Performed by: Raul Arianna PCT    POCT Glucose    Collection Time: 07/02/24  3:46 PM   Result Value Ref Range    POC Glucose 154 (H) 65 - 117 mg/dL    Performed by: Raul Keller PCT    POCT Glucose    Collection Time: 07/02/24  8:44 PM   Result Value Ref Range    POC Glucose 150 (H) 65 - 117 mg/dL    Performed by: Da Castellon RN    CBC with Auto Differential    Collection Time: 07/03/24  2:14 AM   Result Value Ref Range    WBC 7.4 4.1 - 11.1 K/uL    RBC 3.21 (L) 4.10 - 5.70 M/uL    Hemoglobin 9.2 (L) 12.1 - 17.0 g/dL    Hematocrit 28.7 (L) 36.6 - 50.3 %    MCV 89.4 80.0 - 99.0 FL    MCH 28.7 26.0 - 34.0 PG    MCHC 32.1 30.0 - 36.5 g/dL    RDW 12.6 11.5 - 14.5 %    Platelets 231 150 - 400 K/uL    MPV 9.9 8.9 - 12.9 FL    Nucleated RBCs 0.0 0  WBC    nRBC 0.00 0.00 - 0.01 K/uL    Neutrophils % 62 32 - 75 %    Lymphocytes % 26 12 - 49 %    Monocytes % 8 5 - 13 %    Eosinophils % 3 0 - 7 %    Basophils % 1 0

## 2024-07-03 NOTE — PROGRESS NOTES
PCP hospital follow-up transitional care appointment has been scheduled with Dr. Rolo Padilla on 7/8/24 0779. [facility] placed [facility] information AVS for patient resource.   Pending patient discharge.  Flory Rajput, Care Management Assistant

## 2024-07-04 LAB
ALBUMIN SERPL-MCNC: 2.7 G/DL (ref 3.5–5)
ANION GAP SERPL CALC-SCNC: 5 MMOL/L (ref 5–15)
BASOPHILS # BLD: 0.1 K/UL (ref 0–0.1)
BASOPHILS NFR BLD: 1 % (ref 0–1)
BUN SERPL-MCNC: 49 MG/DL (ref 6–20)
BUN/CREAT SERPL: 7 (ref 12–20)
CALCIUM SERPL-MCNC: 8.8 MG/DL (ref 8.5–10.1)
CHLORIDE SERPL-SCNC: 104 MMOL/L (ref 97–108)
CO2 SERPL-SCNC: 28 MMOL/L (ref 21–32)
CREAT SERPL-MCNC: 6.61 MG/DL (ref 0.7–1.3)
DIFFERENTIAL METHOD BLD: ABNORMAL
EOSINOPHIL # BLD: 0.2 K/UL (ref 0–0.4)
EOSINOPHIL NFR BLD: 3 % (ref 0–7)
ERYTHROCYTE [DISTWIDTH] IN BLOOD BY AUTOMATED COUNT: 12.4 % (ref 11.5–14.5)
GLUCOSE BLD STRIP.AUTO-MCNC: 144 MG/DL (ref 65–117)
GLUCOSE BLD STRIP.AUTO-MCNC: 189 MG/DL (ref 65–117)
GLUCOSE BLD STRIP.AUTO-MCNC: 198 MG/DL (ref 65–117)
GLUCOSE BLD STRIP.AUTO-MCNC: 239 MG/DL (ref 65–117)
GLUCOSE SERPL-MCNC: 181 MG/DL (ref 65–100)
HCT VFR BLD AUTO: 28.6 % (ref 36.6–50.3)
HGB BLD-MCNC: 9.2 G/DL (ref 12.1–17)
IMM GRANULOCYTES # BLD AUTO: 0 K/UL (ref 0–0.04)
IMM GRANULOCYTES NFR BLD AUTO: 0 % (ref 0–0.5)
LYMPHOCYTES # BLD: 2 K/UL (ref 0.8–3.5)
LYMPHOCYTES NFR BLD: 27 % (ref 12–49)
MCH RBC QN AUTO: 28.8 PG (ref 26–34)
MCHC RBC AUTO-ENTMCNC: 32.2 G/DL (ref 30–36.5)
MCV RBC AUTO: 89.4 FL (ref 80–99)
MONOCYTES # BLD: 0.7 K/UL (ref 0–1)
MONOCYTES NFR BLD: 10 % (ref 5–13)
NEUTS SEG # BLD: 4.6 K/UL (ref 1.8–8)
NEUTS SEG NFR BLD: 59 % (ref 32–75)
NRBC # BLD: 0 K/UL (ref 0–0.01)
NRBC BLD-RTO: 0 PER 100 WBC
PHOSPHATE SERPL-MCNC: 3.6 MG/DL (ref 2.6–4.7)
PLATELET # BLD AUTO: 269 K/UL (ref 150–400)
PMV BLD AUTO: 10 FL (ref 8.9–12.9)
POTASSIUM SERPL-SCNC: 4.5 MMOL/L (ref 3.5–5.1)
RBC # BLD AUTO: 3.2 M/UL (ref 4.1–5.7)
SERVICE CMNT-IMP: ABNORMAL
SODIUM SERPL-SCNC: 137 MMOL/L (ref 136–145)
WBC # BLD AUTO: 7.6 K/UL (ref 4.1–11.1)

## 2024-07-04 PROCEDURE — 85025 COMPLETE CBC W/AUTO DIFF WBC: CPT

## 2024-07-04 PROCEDURE — 36415 COLL VENOUS BLD VENIPUNCTURE: CPT

## 2024-07-04 PROCEDURE — 6370000000 HC RX 637 (ALT 250 FOR IP): Performed by: INTERNAL MEDICINE

## 2024-07-04 PROCEDURE — 2580000003 HC RX 258: Performed by: STUDENT IN AN ORGANIZED HEALTH CARE EDUCATION/TRAINING PROGRAM

## 2024-07-04 PROCEDURE — 82962 GLUCOSE BLOOD TEST: CPT

## 2024-07-04 PROCEDURE — 1100000003 HC PRIVATE W/ TELEMETRY

## 2024-07-04 PROCEDURE — 6370000000 HC RX 637 (ALT 250 FOR IP)

## 2024-07-04 PROCEDURE — 80069 RENAL FUNCTION PANEL: CPT

## 2024-07-04 PROCEDURE — 6370000000 HC RX 637 (ALT 250 FOR IP): Performed by: NURSE PRACTITIONER

## 2024-07-04 RX ORDER — LACTOBACILLUS RHAMNOSUS GG 10B CELL
1 CAPSULE ORAL
Status: DISCONTINUED | OUTPATIENT
Start: 2024-07-04 | End: 2024-07-05 | Stop reason: HOSPADM

## 2024-07-04 RX ORDER — FAMOTIDINE 20 MG/1
20 TABLET, FILM COATED ORAL ONCE
Status: COMPLETED | OUTPATIENT
Start: 2024-07-04 | End: 2024-07-04

## 2024-07-04 RX ADMIN — MIDODRINE HYDROCHLORIDE 5 MG: 5 TABLET ORAL at 17:11

## 2024-07-04 RX ADMIN — MIDODRINE HYDROCHLORIDE 5 MG: 5 TABLET ORAL at 11:32

## 2024-07-04 RX ADMIN — MIDODRINE HYDROCHLORIDE 5 MG: 5 TABLET ORAL at 08:56

## 2024-07-04 RX ADMIN — SODIUM CHLORIDE, PRESERVATIVE FREE 10 ML: 5 INJECTION INTRAVENOUS at 20:52

## 2024-07-04 RX ADMIN — CLOPIDOGREL BISULFATE 75 MG: 75 TABLET ORAL at 08:56

## 2024-07-04 RX ADMIN — ASPIRIN 81 MG: 81 TABLET, CHEWABLE ORAL at 08:56

## 2024-07-04 RX ADMIN — INSULIN LISPRO 1 UNITS: 100 INJECTION, SOLUTION INTRAVENOUS; SUBCUTANEOUS at 11:30

## 2024-07-04 RX ADMIN — Medication 5000 UNITS: at 08:57

## 2024-07-04 RX ADMIN — ATORVASTATIN CALCIUM 40 MG: 40 TABLET, FILM COATED ORAL at 20:52

## 2024-07-04 RX ADMIN — Medication 1 CAPSULE: at 22:56

## 2024-07-04 RX ADMIN — SODIUM CHLORIDE, PRESERVATIVE FREE 10 ML: 5 INJECTION INTRAVENOUS at 08:58

## 2024-07-04 RX ADMIN — AMLODIPINE BESYLATE 10 MG: 5 TABLET ORAL at 08:56

## 2024-07-04 RX ADMIN — FAMOTIDINE 20 MG: 20 TABLET, FILM COATED ORAL at 04:09

## 2024-07-04 ASSESSMENT — PAIN SCALES - GENERAL
PAINLEVEL_OUTOF10: 0
PAINLEVEL_OUTOF10: 0

## 2024-07-04 NOTE — PROGRESS NOTES
End of Shift Note    Bedside shift change report given to Jennifer SHAW  (oncoming nurse) by Brii Fontaine RN .        Shift worked:  nights   Shift summary and any significant changes:    No acute events  BP on higher side tonight, still unsteady on feet, weaker on left side, no further syncopal episodes tonight, potential discharge today   Concerns for physician to address:  None   Zone phone for oncoming shift:        Patient Information  Gerald Yuen  58 y.o.  6/25/2024 10:54 PM by Matthew Hart MD. Gerald Yuen was admitted from [facility]    Problem List  Patient Active Problem List    Diagnosis Date Noted    Cerebral artery occlusion with cerebral infarction (HCC) 07/01/2024    Acute alteration in mental status 07/01/2024    Convulsive syncope 07/01/2024    Cerebrovascular accident (CVA) (HCC) 07/01/2024    CKD (chronic kidney disease) 06/26/2024     Past Medical History:   Diagnosis Date    Cerebral artery occlusion with cerebral infarction (HCC)     Chronic kidney disease     Diabetes mellitus (HCC)        Core Measures:  CVA: yes  CHF: no  PNA: no    Activity:  Level of Assistance: Minimal assist, patient does 75% or more  Number times ambulated in hallways past shift: 0  Number of times OOB to chair past shift: 0    Cardiac:   Cardiac Monitoring: yes, SR    Access:   Current line(s): PIV, HD catheter right neck    Respiratory:   O2 Device: None (Room air)    GI:  Last BM (including prior to admit): 07/04/24  Current diet:  ADULT DIET; Regular; 5 carb choices (75 gm/meal); Low Sodium (2 gm)  ADULT ORAL NUTRITION SUPPLEMENT; Breakfast, Dinner; Diabetic Oral Supplement  Tolerating current diet: Yes    Pain Management:   Patient states pain is manageable on current regimen: yes    Skin:  Sanket Scale Score: 18  Interventions: N/A  Pressure injury: no    Patient Safety:  Fall Score: Gilbert Total Score: 70  Interventions: bed alarm  Self-release roll belt: No  Dexterity to release roll belt: yes   (must

## 2024-07-04 NOTE — PROGRESS NOTES
Spiritual Care Assessment/Progress Note  [facility]    Name: Gerald Yune MRN: [id number]    Age: 58 y.o.     Sex: male   Language: English     Date: 7/4/2024            Total Time Calculated: 10 min              Spiritual Assessment begun in [facility] NEUROSCIENCE TELEMETRY  Service Provided For: Patient not available  Referral/Consult From: Rounding  Encounter Overview/Reason: Attempted Encounter    Spiritual beliefs:      [] Involved in a harriet tradition/spiritual practice:      [] Supported by a harriet community:      [] Claims no spiritual orientation:      [] Seeking spiritual identity:           [] Adheres to an individual form of spirituality:      [x] Not able to assess:                Identified resources for coping and support system:   Support System: Unknown       [] Prayer                  [] Devotional reading               [] Music                  [] Guided Imagery     [] Pet visits                                        [] Other: (COMMENT)     Specific area/focus of visit   Encounter:    Crisis:    Spiritual/Emotional needs: Type: Spiritual Support  Ritual, Rites and Sacraments:    Grief, Loss, and Adjustments:    Ethics/Mediation:    Behavioral Health:    Palliative Care:    Advance Care Planning:      Plan/Referrals: Continue to visit, (comment), Continue Support (comment)    Narrative:  reviewed the patient's chart prior to the visit. Patient was asleep.  prayed silently.    Spiritual Health Services are available 24 hours a day as requested.    Rev. VIOLETTA Light  [facility]   Paging Service [phone])

## 2024-07-04 NOTE — PROGRESS NOTES
End of Shift Note    Bedside shift change report given to Sandie RN  (oncoming nurse) by Marimar Medrano RN .        Shift worked:  7a-7p   Shift summary and any significant changes:    No acute events  Patient did get up by himself once, he reported no dizziness or syncope events.     Concerns for physician to address:  None   Zone phone for oncoming shift:   [phone]     Patient Information  Gerald Yuen  58 y.o.  6/25/2024 10:54 PM by Matthew Hart MD. Gerald Yuen was admitted from [facility]    Problem List  Patient Active Problem List    Diagnosis Date Noted    Cerebral artery occlusion with cerebral infarction (HCC) 07/01/2024    Acute alteration in mental status 07/01/2024    Convulsive syncope 07/01/2024    Cerebrovascular accident (CVA) (HCC) 07/01/2024    CKD (chronic kidney disease) 06/26/2024     Past Medical History:   Diagnosis Date    Cerebral artery occlusion with cerebral infarction (HCC)     Chronic kidney disease     Diabetes mellitus (HCC)        Core Measures:  CVA: yes  CHF: no  PNA: no    Activity:  Level of Assistance: Minimal assist, patient does 75% or more  Number times ambulated in hallways past shift: 0  Number of times OOB to chair past shift: 0    Cardiac:   Cardiac Monitoring: yes, SR    Access:   Current line(s): PIV, HD catheter right neck    Respiratory:   O2 Device: None (Room air)    GI:  Last BM (including prior to admit): 07/04/24  Current diet:  ADULT DIET; Regular; 5 carb choices (75 gm/meal); Low Sodium (2 gm)  ADULT ORAL NUTRITION SUPPLEMENT; Breakfast, Dinner; Diabetic Oral Supplement  Tolerating current diet: Yes    Pain Management:   Patient states pain is manageable on current regimen: yes    Skin:  Sanket Scale Score: 19  Interventions: N/A  Pressure injury: no    Patient Safety:  Fall Score: Gilbert Total Score: 70  Interventions: bed alarm  Self-release roll belt: No  Dexterity to release roll belt: yes   (must document dexterity  here by stating Yes or No here,

## 2024-07-04 NOTE — PLAN OF CARE
Problem: Safety - Adult  Goal: Free from fall injury  7/4/2024 0832 by Marimar Medrano RN  Outcome: Progressing  7/4/2024 0052 by Brii Fontaine RN  Outcome: Progressing     Problem: Chronic Conditions and Co-morbidities  Goal: Patient's chronic conditions and co-morbidity symptoms are monitored and maintained or improved  7/4/2024 0832 by Marimar Medrano RN  Outcome: Progressing  7/4/2024 0052 by Brii Fontaine RN  Outcome: Progressing     Problem: Skin/Tissue Integrity  Goal: Absence of new skin breakdown  Description: 1.  Monitor for areas of redness and/or skin breakdown  2.  Assess vascular access sites hourly  3.  Every 4-6 hours minimum:  Change oxygen saturation probe site  4.  Every 4-6 hours:  If on nasal continuous positive airway pressure, respiratory therapy assess nares and determine need for appliance change or resting period.  7/4/2024 0832 by Marimar Medrano RN  Outcome: Progressing  7/4/2024 0052 by Brii Fontaine RN  Outcome: Progressing     Problem: Nutrition Deficit:  Goal: Optimize nutritional status  7/4/2024 0832 by Marimar Medrano RN  Outcome: Progressing  7/4/2024 0052 by Brii Fontaine RN  Outcome: Progressing     Problem: Discharge Planning  Goal: Discharge to home or other facility with appropriate resources  7/4/2024 0832 by Marimar Medrano RN  Outcome: Progressing  7/4/2024 0052 by Brii Fontaine RN  Outcome: Progressing     Problem: Genitourinary - Adult  Goal: Absence of urinary retention  7/4/2024 0052 by Brii Fontaine RN  Outcome: Progressing  Goal: Urinary catheter remains patent  7/4/2024 0052 by Brii Fontaine RN  Outcome: Progressing     Problem: Metabolic/Fluid and Electrolytes - Adult  Goal: Electrolytes maintained within normal limits  7/4/2024 0832 by Marimar Medrano RN  Outcome: Progressing  7/4/2024 0052 by Brii Fontaine RN  Outcome: Progressing  Goal: Hemodynamic stability and optimal renal function maintained  7/4/2024 0832 by Marimar Medrano RN  Outcome: Progressing  7/4/2024 0052 by Brii Fontaine

## 2024-07-04 NOTE — PLAN OF CARE
Problem: Safety - Adult  Goal: Free from fall injury  Outcome: Progressing     Problem: Chronic Conditions and Co-morbidities  Goal: Patient's chronic conditions and co-morbidity symptoms are monitored and maintained or improved  Outcome: Progressing     Problem: Physical Therapy - Adult  Goal: By Discharge: Performs mobility at highest level of function for planned discharge setting.  See evaluation for individualized goals.  Description: FUNCTIONAL STATUS PRIOR TO ADMISSION: Patient was independent and active without use of DME, though reports inactivity 2/2 weakness/low energy/decreased appetite over the last month.     HOME SUPPORT PRIOR TO ADMISSION: The patient lived with parents and brother but did not require assistance. Pt reports Dad requires assistance from Mom, brother had multiple surgeries and doesn't move very well.     Physical Therapy Goals  Initiated 6/26/2024  Re-assessment 7/3/2024: goals remain appropriate  1.  Patient will move from supine to sit and sit to supine, scoot up and down, and roll side to side in bed with independence within 7 day(s).    2.  Patient will perform sit to stand with modified independence within 7 day(s).  3.  Patient will transfer from bed to chair and chair to bed with modified independence using the least restrictive device within 7 day(s).  4.  Patient will ambulate with modified independence for 50 feet with the least restrictive device within 7 day(s).   7/3/2024 1458 by Spring Brooks, PT  Outcome: Progressing     Problem: Skin/Tissue Integrity  Goal: Absence of new skin breakdown  Description: 1.  Monitor for areas of redness and/or skin breakdown  2.  Assess vascular access sites hourly  3.  Every 4-6 hours minimum:  Change oxygen saturation probe site  4.  Every 4-6 hours:  If on nasal continuous positive airway pressure, respiratory therapy assess nares and determine need for appliance change or resting period.  Outcome: Progressing     Problem: Nutrition

## 2024-07-04 NOTE — PROGRESS NOTES
Nursing contacted Nocturnist/cross cover provider via non-urgent messaging system [employer] and notified patient asking for something for reflux. No other concerns reported. No acute distress reported. No other information provided by nurse. VSS. Ordered pepcid 20mg po x1. Will defer further evaluation/management to the day shift primary attending care team. Patient denies any further complaints or concerns. Nursing to notify Hospitalist for further/continued concerns. Will remain available overnight for further concerns if nursing/patient needs. Please note, there are RRT systems in this hospital in place that if nursing has acute or critical patient condition change or concern, this is to help facilitate and notify that patient needs immediate bedside evaluation by a provider.     Non-billable note.

## 2024-07-05 ENCOUNTER — APPOINTMENT (OUTPATIENT)
Facility: HOSPITAL | Age: 59
End: 2024-07-05
Attending: INTERNAL MEDICINE
Payer: MEDICARE

## 2024-07-05 VITALS
RESPIRATION RATE: 19 BRPM | DIASTOLIC BLOOD PRESSURE: 78 MMHG | OXYGEN SATURATION: 99 % | HEART RATE: 81 BPM | TEMPERATURE: 98.1 F | HEIGHT: 74 IN | WEIGHT: 182.98 LBS | BODY MASS INDEX: 23.48 KG/M2 | SYSTOLIC BLOOD PRESSURE: 139 MMHG

## 2024-07-05 LAB
ALBUMIN SERPL-MCNC: 2.8 G/DL (ref 3.5–5)
ANION GAP SERPL CALC-SCNC: 6 MMOL/L (ref 5–15)
BASOPHILS # BLD: 0 K/UL (ref 0–0.1)
BASOPHILS NFR BLD: 1 % (ref 0–1)
BUN SERPL-MCNC: 61 MG/DL (ref 6–20)
BUN/CREAT SERPL: 8 (ref 12–20)
CALCIUM SERPL-MCNC: 8.7 MG/DL (ref 8.5–10.1)
CHLORIDE SERPL-SCNC: 107 MMOL/L (ref 97–108)
CO2 SERPL-SCNC: 26 MMOL/L (ref 21–32)
CREAT SERPL-MCNC: 7.56 MG/DL (ref 0.7–1.3)
DIFFERENTIAL METHOD BLD: ABNORMAL
ECHO BSA: 2.08 M2
EOSINOPHIL # BLD: 0.2 K/UL (ref 0–0.4)
EOSINOPHIL NFR BLD: 3 % (ref 0–7)
ERYTHROCYTE [DISTWIDTH] IN BLOOD BY AUTOMATED COUNT: 12.6 % (ref 11.5–14.5)
GLUCOSE BLD STRIP.AUTO-MCNC: 128 MG/DL (ref 65–117)
GLUCOSE BLD STRIP.AUTO-MCNC: 159 MG/DL (ref 65–117)
GLUCOSE SERPL-MCNC: 168 MG/DL (ref 65–100)
HCT VFR BLD AUTO: 28.5 % (ref 36.6–50.3)
HGB BLD-MCNC: 8.8 G/DL (ref 12.1–17)
IMM GRANULOCYTES # BLD AUTO: 0 K/UL (ref 0–0.04)
IMM GRANULOCYTES NFR BLD AUTO: 0 % (ref 0–0.5)
LYMPHOCYTES # BLD: 1.8 K/UL (ref 0.8–3.5)
LYMPHOCYTES NFR BLD: 23 % (ref 12–49)
MCH RBC QN AUTO: 28 PG (ref 26–34)
MCHC RBC AUTO-ENTMCNC: 30.9 G/DL (ref 30–36.5)
MCV RBC AUTO: 90.8 FL (ref 80–99)
MONOCYTES # BLD: 0.6 K/UL (ref 0–1)
MONOCYTES NFR BLD: 8 % (ref 5–13)
NEUTS SEG # BLD: 5 K/UL (ref 1.8–8)
NEUTS SEG NFR BLD: 65 % (ref 32–75)
NRBC # BLD: 0 K/UL (ref 0–0.01)
NRBC BLD-RTO: 0 PER 100 WBC
PHOSPHATE SERPL-MCNC: 3.6 MG/DL (ref 2.6–4.7)
PLATELET # BLD AUTO: 260 K/UL (ref 150–400)
PMV BLD AUTO: 9.9 FL (ref 8.9–12.9)
POTASSIUM SERPL-SCNC: 4.9 MMOL/L (ref 3.5–5.1)
RBC # BLD AUTO: 3.14 M/UL (ref 4.1–5.7)
SERVICE CMNT-IMP: ABNORMAL
SERVICE CMNT-IMP: ABNORMAL
SODIUM SERPL-SCNC: 139 MMOL/L (ref 136–145)
WBC # BLD AUTO: 7.7 K/UL (ref 4.1–11.1)

## 2024-07-05 PROCEDURE — 90935 HEMODIALYSIS ONE EVALUATION: CPT

## 2024-07-05 PROCEDURE — 6370000000 HC RX 637 (ALT 250 FOR IP): Performed by: INTERNAL MEDICINE

## 2024-07-05 PROCEDURE — 6370000000 HC RX 637 (ALT 250 FOR IP)

## 2024-07-05 PROCEDURE — 6360000002 HC RX W HCPCS: Performed by: INTERNAL MEDICINE

## 2024-07-05 PROCEDURE — 6360000002 HC RX W HCPCS

## 2024-07-05 PROCEDURE — 82962 GLUCOSE BLOOD TEST: CPT

## 2024-07-05 PROCEDURE — 85025 COMPLETE CBC W/AUTO DIFF WBC: CPT

## 2024-07-05 PROCEDURE — 93270 REMOTE 30 DAY ECG REV/REPORT: CPT

## 2024-07-05 PROCEDURE — 80069 RENAL FUNCTION PANEL: CPT

## 2024-07-05 PROCEDURE — 36415 COLL VENOUS BLD VENIPUNCTURE: CPT

## 2024-07-05 PROCEDURE — 2580000003 HC RX 258: Performed by: STUDENT IN AN ORGANIZED HEALTH CARE EDUCATION/TRAINING PROGRAM

## 2024-07-05 RX ORDER — HEPARIN SODIUM 1000 [USP'U]/ML
INJECTION, SOLUTION INTRAVENOUS; SUBCUTANEOUS
Status: COMPLETED
Start: 2024-07-05 | End: 2024-07-05

## 2024-07-05 RX ORDER — MIDODRINE HYDROCHLORIDE 2.5 MG/1
2.5 TABLET ORAL
Qty: 12 TABLET | Refills: 0 | Status: SHIPPED | OUTPATIENT
Start: 2024-07-05 | End: 2024-08-04

## 2024-07-05 RX ORDER — CLOPIDOGREL BISULFATE 75 MG/1
75 TABLET ORAL DAILY
Qty: 14 TABLET | Refills: 0 | Status: SHIPPED | OUTPATIENT
Start: 2024-07-05 | End: 2024-07-19

## 2024-07-05 RX ORDER — ASPIRIN 81 MG/1
81 TABLET, CHEWABLE ORAL DAILY
Qty: 30 TABLET | Refills: 3 | Status: SHIPPED | OUTPATIENT
Start: 2024-07-05

## 2024-07-05 RX ORDER — ATORVASTATIN CALCIUM 40 MG/1
40 TABLET, FILM COATED ORAL NIGHTLY
Qty: 30 TABLET | Refills: 3 | Status: SHIPPED | OUTPATIENT
Start: 2024-07-05

## 2024-07-05 RX ORDER — AMLODIPINE BESYLATE 10 MG/1
10 TABLET ORAL DAILY
Qty: 30 TABLET | Refills: 3 | Status: SHIPPED | OUTPATIENT
Start: 2024-07-05

## 2024-07-05 RX ADMIN — Medication 5000 UNITS: at 12:17

## 2024-07-05 RX ADMIN — SODIUM CHLORIDE, PRESERVATIVE FREE 10 ML: 5 INJECTION INTRAVENOUS at 12:22

## 2024-07-05 RX ADMIN — HEPARIN SODIUM 1300 UNITS: 1000 INJECTION INTRAVENOUS; SUBCUTANEOUS at 11:01

## 2024-07-05 RX ADMIN — AMLODIPINE BESYLATE 10 MG: 5 TABLET ORAL at 12:17

## 2024-07-05 RX ADMIN — ASPIRIN 81 MG: 81 TABLET, CHEWABLE ORAL at 12:17

## 2024-07-05 RX ADMIN — MIDODRINE HYDROCHLORIDE 5 MG: 5 TABLET ORAL at 12:21

## 2024-07-05 NOTE — FLOWSHEET NOTE
Primary RN SBAR: Sandie Pollack, RN  Patient Education provided: HD treatment  Incapacitated Nurse claribel. provided: 2nd RN in HD suite  Preferred Education method and Primary language: Verbal, English  Hospital associated wait time; reason: 37 min wait for transport  Hepatitis B Surface Ag   Date/Time Value Ref Range Status   06/28/2024 04:47 AM <0.10 Index Final     Hep B S Ag Interp   Date/Time Value Ref Range Status   06/28/2024 04:47 AM Negative NEG   Final     Hep B S Ab   Date/Time Value Ref Range Status   06/28/2024 04:47 AM <3.10 mIU/mL Final     Hep B S Ab Interp   Date/Time Value Ref Range Status   06/28/2024 04:47 AM NONREACTIVE NR   Final     Comment:     (NOTE)  The [employer]VIA Centaur Anti-HBs2 assay is traceable to the World Health   Organization (WHO) Hepatitis B Immunoglobulin 1st International   Reference Preparation (1977). Samples with a calculated value of 10   mIU/mL or greater are considered reactive (protective) in accordance   with the CDC guidelines. The accepted criteria for immunity to HBV is   anti-HBs activity greater than or equal to 10 mIU/mL, as defined by   the WHO International Reference Preparation.  Assay performance has not been established in pregnant women,   patients who are immunosuppressed or immunocompromised, nor have   performance characteristics been established in conjunction with   other 's assays for specific HBV serologic markers. This   assay does not differentiate between vaccine induced immune response   and a response due to infection with HBV. Passively acquired anti-HBs   may be identified following patient transfusion, receipt of   immunoglobulin products, etc.          07/05/24 0756   Vital Signs   BP (!) 159/73   Temp 97.4 °F (36.3 °C)   Pulse 84   Respirations 18   Pain Assessment   Pain Assessment None - Denies Pain   Treatment   Time On 0756   Treatment Goal 2000 ml   Observations & Evaluations   Level of Consciousness 0   Oriented X 4   Heart Rhythm

## 2024-07-05 NOTE — FLOWSHEET NOTE
07/05/24 1057   Vital Signs   BP (!) 155/77   Temp 97.9 °F (36.6 °C)   Pulse 79   Respirations 18   Pain Assessment   Pain Assessment None - Denies Pain   Post-Hemodialysis Assessment   Post-Treatment Procedures Blood returned;Catheter capped, clamped and heparinized x 2 ports   Machine Disinfection Process Exterior Machine Disinfection   Rinseback Volume (ml) 300 ml   Blood Volume Processed (Liters) 49.8 L   Dialyzer Clearance Lightly streaked   Duration of Treatment (minutes) 180 minutes   Hemodialysis Intake (ml) 500 ml   Hemodialysis Output (ml) 2500 ml   NET Removed (ml) 2000   Tolerated Treatment Good   Bilateral Breath Sounds Clear   Edema None   Time Off 1057   Patient Disposition Return to room   Observations & Evaluations   Level of Consciousness 0   Oriented X 4   Heart Rhythm Regular   Respiratory Quality/Effort Unlabored   O2 Device None (Room air)   Skin Condition/Temp Dry;Warm   Appetite Good   Abdomen Inspection Soft   Bowel Sounds (All Quadrants) Absent     Primary RN SBAR: Gus Baptiste, RN  Comments: Pt tolerated treatment well without any complaints or complications. 2000 ml removed. CVC dressing changed.

## 2024-07-05 NOTE — PLAN OF CARE
Problem: Safety - Adult  Goal: Free from fall injury  Outcome: Progressing     Problem: Chronic Conditions and Co-morbidities  Goal: Patient's chronic conditions and co-morbidity symptoms are monitored and maintained or improved  Outcome: Progressing     Problem: Skin/Tissue Integrity  Goal: Absence of new skin breakdown  Description: 1.  Monitor for areas of redness and/or skin breakdown  2.  Assess vascular access sites hourly  3.  Every 4-6 hours minimum:  Change oxygen saturation probe site  4.  Every 4-6 hours:  If on nasal continuous positive airway pressure, respiratory therapy assess nares and determine need for appliance change or resting period.  Outcome: Progressing     Problem: Nutrition Deficit:  Goal: Optimize nutritional status  Outcome: Progressing     Problem: Discharge Planning  Goal: Discharge to home or other facility with appropriate resources  Outcome: Progressing     Problem: Genitourinary - Adult  Goal: Absence of urinary retention  Outcome: Progressing  Goal: Urinary catheter remains patent  Outcome: Progressing     Problem: Metabolic/Fluid and Electrolytes - Adult  Goal: Electrolytes maintained within normal limits  Outcome: Progressing  Goal: Hemodynamic stability and optimal renal function maintained  Outcome: Progressing  Goal: Glucose maintained within prescribed range  Outcome: Progressing     Problem: Pain  Goal: Verbalizes/displays adequate comfort level or baseline comfort level  Outcome: Progressing  Flowsheets (Taken 7/4/2024 2049)  Verbalizes/displays adequate comfort level or baseline comfort level:   Encourage patient to monitor pain and request assistance   Assess pain using appropriate pain scale   Administer analgesics based on type and severity of pain and evaluate response   Implement non-pharmacological measures as appropriate and evaluate response   Consider cultural and social influences on pain and pain management   Notify Licensed Independent Practitioner if  interventions unsuccessful or patient reports new pain

## 2024-07-05 NOTE — PROGRESS NOTES
PLACEMENT GREATER THAN 5 YEARS 7/1/2024 Jaylyn Oliva PA [facility] IR       Social history:   Social History     Tobacco Use    Smoking status: Never     Passive exposure: Never    Smokeless tobacco: Never   Substance Use Topics    Alcohol use: Never    Drug use: Never       Family history:  No history of CKD or ESRD in the family.     No Known Allergies    Current Facility-Administered Medications   Medication Dose Route Frequency Provider Last Rate Last Admin    lactobacillus (CULTURELLE) capsule 1 capsule  1 capsule Oral Daily with breakfast SunHarshad APRN - CNP   1 capsule at 07/04/24 2256    midodrine (PROAMATINE) tablet 5 mg  5 mg Oral TID  Riley Villanueva MD   5 mg at 07/04/24 1711    amLODIPine (NORVASC) tablet 10 mg  10 mg Oral Daily Riley Villanueva MD   10 mg at 07/04/24 0856    heparin (porcine) 1000 UNIT/ML injection 1,300 Units  1,300 Units IntraCATHeter PRN Memo Gaspar MD   1,300 Units at 07/03/24 1050    And    heparin (porcine) 1000 UNIT/ML injection 1,300 Units  1,300 Units IntraCATHeter PRN Memo Gaspar MD   1,300 Units at 07/03/24 1050    aspirin chewable tablet 81 mg  81 mg Oral Daily Stephen, Jodi, DO   81 mg at 07/04/24 0856    clopidogrel (PLAVIX) tablet 75 mg  75 mg Oral Daily Stephen, Jodi, DO   75 mg at 07/04/24 0856    atorvastatin (LIPITOR) tablet 40 mg  40 mg Oral Nightly Stephen, Jodi, DO   40 mg at 07/04/24 2052    ondansetron (ZOFRAN-ODT) disintegrating tablet 4 mg  4 mg Oral Q8H PRN Maricarmen Weiss MD        Or    ondansetron (ZOFRAN) injection 4 mg  4 mg IntraVENous Q6H PRN Maricarmen Weiss MD        polyethylene glycol (GLYCOLAX) packet 17 g  17 g Oral Daily PRN Maricarmen Weiss MD        [Held by provider] heparin (porcine) injection 5,000 Units  5,000 Units SubCUTAneous 3 times per day Maricarmen Weiss MD   5,000 Units at 06/30/24 0544    Vitamin D (CHOLECALCIFEROL) tablet 5,000 Units  5,000 Units Oral Daily Daksha Oh, APRN - NP   5,000  0.0 - 0.5 %    Neutrophils Absolute 5.0 1.8 - 8.0 K/UL    Lymphocytes Absolute 1.8 0.8 - 3.5 K/UL    Monocytes Absolute 0.6 0.0 - 1.0 K/UL    Eosinophils Absolute 0.2 0.0 - 0.4 K/UL    Basophils Absolute 0.0 0.0 - 0.1 K/UL    Immature Granulocytes Absolute 0.0 0.00 - 0.04 K/UL    Differential Type AUTOMATED     Renal Function Panel    Collection Time: 07/05/24  4:03 AM   Result Value Ref Range    Sodium 139 136 - 145 mmol/L    Potassium 4.9 3.5 - 5.1 mmol/L    Chloride 107 97 - 108 mmol/L    CO2 26 21 - 32 mmol/L    Anion Gap 6 5 - 15 mmol/L    Glucose 168 (H) 65 - 100 mg/dL    BUN 61 (H) 6 - 20 MG/DL    Creatinine 7.56 (H) 0.70 - 1.30 MG/DL    BUN/Creatinine Ratio 8 (L) 12 - 20      Est, Glom Filt Rate 8 (L) >60 ml/min/1.73m2    Calcium 8.7 8.5 - 10.1 MG/DL    Phosphorus 3.6 2.6 - 4.7 MG/DL    Albumin 2.8 (L) 3.5 - 5.0 g/dL   POCT Glucose    Collection Time: 07/05/24  6:47 AM   Result Value Ref Range    POC Glucose 159 (H) 65 - 117 mg/dL    Performed by: Sweta Duncan PCT          IR TUNNELED CVC PLACE WO SQ PORT/PUMP > 5 YEARS   Final Result   Technically successful exchange of a temporary hemodialysis catheter   for a tunneled Permacath with fluoroscopic guidance.             Electronically signed by RICHARD PEDROZA      Vascular duplex carotid bilateral   Final Result      CT HEAD WO CONTRAST   Final Result   No acute intracranial hemorrhage, mass or territorial infarct.   Nonvisualization of patient's known right corpus callosal body\frontal lobe   acute infarct, seen on prior MR dated June 27, 2024.         Electronically signed by Abhay Valentin MD      IR NONTUNNELED VASCULAR CATHETER > 5 YEARS   Final Result   Technically successful ultrasound guided placement of a right internal jugular   vein temporary dialysis catheter.  A post procedure chest x-ray is pending.         Electronically signed by MANN QUIROZ      XR CHEST PORTABLE   Final Result      Dialysis catheter in place.      Electronically signed

## 2024-07-05 NOTE — PROGRESS NOTES
Occupational Therapy  7/5/2024    Chart reviewed and up to date, attempted session but patient currently BRENTON. Will defer and continue to follow for OT weekly re-assessment.    Thank you,  Cortney Hogue, ADI, OTR/L

## 2024-07-05 NOTE — PROGRESS NOTES
Full report called to phoebe ALFREDO at [facility]. Nurse states she has no further questions at this time. Patient IV and telemetry have been removed. Patient is aware of discharge and family is to transport over to rehab. Holter monitor in place.

## 2024-07-05 NOTE — DISCHARGE SUMMARY
Discharge Summary    Name: Gerald Yuen  [id number]  YOB: 1965 (Age: 58 y.o.)   Date of Admission: 6/25/2024  Date of Discharge: 7/5/2024  Attending Physician: Riley Villanueva MD    Discharge Diagnosis:   Suspected SHAHRAM POA- ruled out  Progression of CKD- 4 to ESRD-started on hemodialysis this admission, s/p permcath placement  7/1, cont MWF schedule as arranged at [facility] ASA unit 3:30pm  Orthostatic hypotension-resolved now, on low dose midodrine Q HD days now  Acute stroke POA involving anterior right body of corpus callosum/frontal lobe  HTN-labile with orthostasis especially with dialysis- stable now on midodrine  DM2  Vit D Def  Generalized weakness, s/p fall x 2-- IPR as arranged  Lumbosacral spondylosis  Lumbar spinal stenosis   Full code      Consultations:  IP CONSULT TO NEPHROLOGY  IP CONSULT TO NEUROLOGY  IP CONSULT TO NEUROLOGY  IP CONSULT TO CASE MANAGEMENT  IP CONSULT TO CARDIOLOGY  IP CONSULT TO CASE MANAGEMENT      Brief Admission History/Reason for Admission Per Dr Mp Coleman:   \"58 y.o. male with diabetes, neuropathy, prior CVA who presents via EMS to the ED because of worsening leg weakness.  He reports his legs feeling weak this past few days.  He is not having difficulty going up and down stairs and and report falling twice as his legs could no longer support him.  Denies head trauma or loss of consciousness.  ED evaluation remarkable for creatinine of 9.2.   service asked to admit for further workup and evaluation\"    Brief Hospital Course by Main Problems:   SHAHRAM on CKD, ?  Stage IV-started on hemodialysis this admission  -He was told in the past that his kidneys were weak but no recent baseline creatinine.  Suspect progression of underlying diabetic nephropathy   -Hold losartan/HCTZ  - renal ultrasound showed no acute abnormality  Appreciate nephrology consult  Status post temporary dialysis cath placement on 6/28, HD on 6/28,

## 2024-07-05 NOTE — CARE COORDINATION
EMTALA  IPR - [facility]. Bed to be assigned on arrival, call report to [phone] and accepting MD Kenneth Barry. Family to transport at 1500.     Transition of Care Plan:     RUR: 15% (moderate RUR)   Prior Level of Functioning: Independent  Disposition: IPR -HDH-P  and then home with new outpatient HD  If SNF or IPR: Date FOC offered: 06/27/24  Date FOC received: 06/27/24  Accepting facility: [facility] accepted  Date authorization started with reference number: Facility started auth 07/02  Date authorization received and expires: Auth approved 07/05  Follow up appointments: PCP/Specialists as indicated  DME needed: None at this time  Transportation at discharge: Family to transport @ 1500  IM/IMM Medicare/ letter given: 2nd given 07/05  Is patient a [place] and connected with [facility]? N/a              If yes, was  transfer form completed and [facility] notified? N/a  Caregiver Contact: Traci Yuen; parent; [phone]  Discharge Caregiver contacted prior to discharge? Pt to contact  Care Conference needed? Not at this time  Barriers to discharge: Bed info     0849 - Auth approved this morning for IPR, they are ready to accept today. Pt currently BR[place]. CM will inquire with MD if pt medically stable.     New HD previously arranged:    Facility: [facility]   Address: [address] [place] [address]   Phone: [phone]  Chair time: MWF @ 3:30pm   Pt to report at 2:45pm on first day of HD with ID and insurance card.     1134 - Pt back from HD. CM made aware of d/c plan today, pt agreeable. Family will be here to transport at 1500. Awaiting bed and EMTALA info.     Patient verbalized understanding and gave permission for possible discharge within 4 hours of receiving IMM.    3776 - EMTALA info above sent to MD.      07/05/24 0907   Services At/After Discharge   Transition of Care Consult (CM Consult) Acute Rehab;Discharge Planning   Services At/After Discharge Inpatient  rehab;Outpatient  (New HD set-up)    Resource Information Provided? No  (N/a)   Mode of Transport at Discharge Other (see comment)  (Family to transport)   Confirm Follow Up Transport Family   Condition of Participation: Discharge Planning   The Plan for Transition of Care is related to the following treatment goals: IPR to continue working toward strength and mobility goals   The Patient and/or Patient Representative was provided with a Choice of Provider? Patient   The Patient and/Or Patient Representative agree with the Discharge Plan? Yes   Freedom of Choice list was provided with basic dialogue that supports the patient's individualized plan of care/goals, treatment preferences, and shares the quality data associated with the providers?  Yes         AVELINO Sanchez  Care Management  [facility]  [phone]

## 2024-07-05 NOTE — DISCHARGE INSTRUCTIONS
HOSPITALIST DISCHARGE INSTRUCTIONS    NAME: Gerald Yuen   :  1965   MRN:  [id number]     Date/Time:  2024 12:07 PM    ADMIT DATE: 2024     DISCHARGE DATE: 2024     DISCHARGE DIAGNOSIS:  Suspected SHAHRAM POA- ruled out  Progression of CKD- 4 to ESRD-started on hemodialysis this admission, s/p permcath placement  , cont MWF schedule as arranged at [facility] unit 3:30pm  Orthostatic hypotension-resolved now, on low dose midodrine Q HD days now  Acute stroke POA involving anterior right body of corpus callosum/frontal lobe  HTN-labile with orthostasis especially with dialysis- stable now on midodrine  DM2  Vit D Def  Generalized weakness, s/p fall x 2-- IPR as arranged  Lumbosacral spondylosis  Lumbar spinal stenosis   Full code       MEDICATIONS:  As per medication reconciliation  list  It is important that you take the medication exactly as they are prescribed.   Keep your medication in the bottles provided by the pharmacist and keep a list of the medication names, dosages, and times to be taken in your wallet.   Do not take other medications without consulting your doctor.     Pain Management: per above medications    What to do at Home    Recommended diet:  cardiac diet, diabetic diet, and low fat, low cholesterol diet    Recommended activity: activity as tolerated    If you have questions regarding the hospital related prescriptions or hospital related issues please call at .    If you experience any of the following symptoms then please call your primary care physician or return to the emergency room if you cannot get hold of your doctor:  Fever, chills, nausea, vomiting, diarrhea, change in mentation, falling, bleeding, shortness of breath,     Follow Up:  PCP  you are to call and set up an appointment to see them in 7-10 days.  [facility] Dr Mitchell in 1 month for post CVA follow up  OP HD at [facility] unit MWF 3:30pm as

## 2024-07-05 NOTE — PROGRESS NOTES
End of Shift Note    Bedside shift change report given to VALERIA lopes  (oncoming nurse) by Sandie Pollack RN .        Shift worked:  pm   Shift summary and any significant changes:    No acute events  Up to br without c/o dizzy or lightheaded-diarrhea during night and med started and patient states diarrhea better-for possible hd today bun 61 and creat 7.56     Concerns for physician to address:  None   Zone phone for oncoming shift:   [phone]     Patient Information  Gerald Yuen  58 y.o.  6/25/2024 10:54 PM by Matthew Hart MD. Gerald Yuen was admitted from [facility]    Problem List  Patient Active Problem List    Diagnosis Date Noted    Cerebral artery occlusion with cerebral infarction (HCC) 07/01/2024    Acute alteration in mental status 07/01/2024    Convulsive syncope 07/01/2024    Cerebrovascular accident (CVA) (HCC) 07/01/2024    CKD (chronic kidney disease) 06/26/2024     Past Medical History:   Diagnosis Date    Cerebral artery occlusion with cerebral infarction ([facility])     Chronic kidney disease     Diabetes mellitus ([facility])        Core Measures:  CVA: yes  CHF: no  PNA: no    Activity:  Level of Assistance: Minimal assist, patient does 75% or more  Number times ambulated in hallways past shift: 0  Number of times OOB to chair past shift: 0    Cardiac:   Cardiac Monitoring: yes, SR    Access:   Current line(s): PIV, HD catheter right neck    Respiratory:   O2 Device: None (Room air)    GI:  Last BM (including prior to admit): 07/04/24  Current diet:  ADULT DIET; Regular; 5 carb choices (75 gm/meal); Low Sodium (2 gm)  ADULT ORAL NUTRITION SUPPLEMENT; Breakfast, Dinner; Diabetic Oral Supplement  Tolerating current diet: Yes    Pain Management:   Patient states pain is manageable on current regimen: yes    Skin:  Sanket Scale Score: 20  Interventions: N/A  Pressure injury: no    Patient Safety:  Fall Score: Gilbert Total Score: 70  Interventions: bed alarm  Self-release roll belt: No  Dexterity to release

## 2024-07-05 NOTE — PROGRESS NOTES
Nursing contacted Nocturnist/cross cover provider via non-urgent messaging system [employer] and notified patient having diarrhea complaints and is asking for something, no information as to how many stools last 24 hours or chronicity, or consistency of stools provided by nurse. No other concerns reported. No acute distress reported. No other information provided by nurse. VSS. Ordered probiotics daily start now, if has greater than 4 watery stools in a 24 hr period could consider doing stool studies, no c/o abd pain reported, no n/v reported. Will defer further evaluation/management to the day shift primary attending care team. Patient denies any further complaints or concerns. Nursing to notify Hospitalist for further/continued concerns. Will remain available overnight for further concerns if nursing/patient needs. Please note, there are RRT systems in this hospital in place that if nursing has acute or critical patient condition change or concern, this is to help facilitate and notify that patient needs immediate bedside evaluation by a provider.     Non-billable note.

## 2024-08-07 LAB — ECHO BSA: 2.08 M2

## 2025-01-30 ENCOUNTER — TRANSCRIBE ORDERS (OUTPATIENT)
Facility: HOSPITAL | Age: 60
End: 2025-01-30

## 2025-01-30 DIAGNOSIS — E13.22 OTHER SPECIFIED DIABETES MELLITUS WITH CHRONIC KIDNEY DISEASE, WITHOUT LONG-TERM CURRENT USE OF INSULIN, UNSPECIFIED CKD STAGE (HCC): ICD-10-CM

## 2025-01-30 DIAGNOSIS — L97.529: Primary | ICD-10-CM

## 2025-01-30 DIAGNOSIS — E11.65 TYPE 2 DIABETES MELLITUS WITH HYPERGLYCEMIA, WITHOUT LONG-TERM CURRENT USE OF INSULIN (HCC): ICD-10-CM

## 2025-02-04 ENCOUNTER — HOSPITAL ENCOUNTER (OUTPATIENT)
Facility: HOSPITAL | Age: 60
Discharge: HOME | End: 2025-02-07
Payer: MEDICARE

## 2025-02-04 ENCOUNTER — HOSPITAL ENCOUNTER (OUTPATIENT)
Facility: HOSPITAL | Age: 60
Discharge: HOME | End: 2025-02-06
Payer: MEDICARE

## 2025-02-04 DIAGNOSIS — L97.529: ICD-10-CM

## 2025-02-04 DIAGNOSIS — E11.65 TYPE 2 DIABETES MELLITUS WITH HYPERGLYCEMIA, WITHOUT LONG-TERM CURRENT USE OF INSULIN (HCC): ICD-10-CM

## 2025-02-04 DIAGNOSIS — E13.22 OTHER SPECIFIED DIABETES MELLITUS WITH CHRONIC KIDNEY DISEASE, WITHOUT LONG-TERM CURRENT USE OF INSULIN, UNSPECIFIED CKD STAGE (HCC): ICD-10-CM

## 2025-02-04 LAB
VAS LEFT ATA PROX PSV: 56.9 CM/S
VAS LEFT CFA PROX PSV: 77.3 CM/S
VAS LEFT PFA PROX PSV: 73.4 CM/S
VAS LEFT POP A DIST PSV: 47 CM/S
VAS LEFT POP A PROX PSV: 53.6 CM/S
VAS LEFT POP A PROX VEL RATIO: 0.53
VAS LEFT PTA PROX PSV: 70.1 CM/S
VAS LEFT SFA DIST PSV: 100.7 CM/S
VAS LEFT SFA DIST VEL RATIO: 1.03
VAS LEFT SFA MID PSV: 97.5 CM/S
VAS LEFT SFA MID VEL RATIO: 1.54
VAS LEFT SFA PROX PSV: 63.5 CM/S
VAS LEFT SFA PROX VEL RATIO: 0.82
VAS RIGHT ATA PROX PSV: 24.1 CM/S
VAS RIGHT CFA PROX PSV: 119 CM/S
VAS RIGHT PFA PROX PSV: 85.8 CM/S
VAS RIGHT POP A DIST PSV: 69 CM/S
VAS RIGHT POP A PROX PSV: 46.3 CM/S
VAS RIGHT POP A PROX VEL RATIO: 0.5
VAS RIGHT PTA PROX PSV: 47.1 CM/S
VAS RIGHT SFA DIST PSV: 92.4 CM/S
VAS RIGHT SFA DIST VEL RATIO: 0.91
VAS RIGHT SFA MID PSV: 101.4 CM/S

## 2025-02-04 PROCEDURE — 93925 LOWER EXTREMITY STUDY: CPT | Performed by: INTERNAL MEDICINE

## 2025-02-04 PROCEDURE — 73718 MRI LOWER EXTREMITY W/O DYE: CPT

## 2025-02-04 PROCEDURE — 93925 LOWER EXTREMITY STUDY: CPT

## 2025-04-22 ENCOUNTER — HOSPITAL ENCOUNTER (OUTPATIENT)
Facility: HOSPITAL | Age: 60
Discharge: HOME | End: 2025-04-24
Payer: MEDICARE

## 2025-04-22 ENCOUNTER — HOSPITAL ENCOUNTER (OUTPATIENT)
Facility: HOSPITAL | Age: 60
Discharge: HOME | End: 2025-04-25
Payer: MEDICARE

## 2025-04-22 VITALS
WEIGHT: 160 LBS | HEART RATE: 75 BPM | HEIGHT: 74 IN | SYSTOLIC BLOOD PRESSURE: 131 MMHG | DIASTOLIC BLOOD PRESSURE: 80 MMHG | BODY MASS INDEX: 20.53 KG/M2

## 2025-04-22 DIAGNOSIS — I20.89 OTHER FORMS OF ANGINA PECTORIS: ICD-10-CM

## 2025-04-22 DIAGNOSIS — Z01.818 ENCOUNTER FOR PRE-OPERATIVE EXAMINATION: ICD-10-CM

## 2025-04-22 PROCEDURE — 78452 HT MUSCLE IMAGE SPECT MULT: CPT

## 2025-04-22 PROCEDURE — 3430000000 HC RX DIAGNOSTIC RADIOPHARMACEUTICAL: Performed by: STUDENT IN AN ORGANIZED HEALTH CARE EDUCATION/TRAINING PROGRAM

## 2025-04-22 PROCEDURE — 93017 CV STRESS TEST TRACING ONLY: CPT

## 2025-04-22 PROCEDURE — A9500 TC99M SESTAMIBI: HCPCS | Performed by: STUDENT IN AN ORGANIZED HEALTH CARE EDUCATION/TRAINING PROGRAM

## 2025-04-22 PROCEDURE — 6360000002 HC RX W HCPCS: Performed by: INTERNAL MEDICINE

## 2025-04-22 RX ORDER — ACETAMINOPHEN 160 MG
2000 TABLET,DISINTEGRATING ORAL DAILY
COMMUNITY

## 2025-04-22 RX ORDER — TETRAKIS(2-METHOXYISOBUTYLISOCYANIDE)COPPER(I) TETRAFLUOROBORATE 1 MG/ML
11 INJECTION, POWDER, LYOPHILIZED, FOR SOLUTION INTRAVENOUS
Status: COMPLETED | OUTPATIENT
Start: 2025-04-22 | End: 2025-04-22

## 2025-04-22 RX ORDER — AMINOPHYLLINE 25 MG/ML
125 INJECTION, SOLUTION INTRAVENOUS ONCE
Status: COMPLETED | OUTPATIENT
Start: 2025-04-22 | End: 2025-04-22

## 2025-04-22 RX ORDER — TETRAKIS(2-METHOXYISOBUTYLISOCYANIDE)COPPER(I) TETRAFLUOROBORATE 1 MG/ML
31.6 INJECTION, POWDER, LYOPHILIZED, FOR SOLUTION INTRAVENOUS
Status: COMPLETED | OUTPATIENT
Start: 2025-04-22 | End: 2025-04-22

## 2025-04-22 RX ORDER — ONDANSETRON 4 MG/1
4 TABLET, FILM COATED ORAL EVERY 8 HOURS PRN
COMMUNITY

## 2025-04-22 RX ORDER — FLUOXETINE 10 MG/1
10 CAPSULE ORAL DAILY
COMMUNITY

## 2025-04-22 RX ORDER — REGADENOSON 0.08 MG/ML
0.4 INJECTION, SOLUTION INTRAVENOUS
Status: COMPLETED | OUTPATIENT
Start: 2025-04-22 | End: 2025-04-22

## 2025-04-22 RX ORDER — SUCRALFATE 1 G/1
1 TABLET ORAL 4 TIMES DAILY
COMMUNITY

## 2025-04-22 RX ADMIN — TETRAKIS(2-METHOXYISOBUTYLISOCYANIDE)COPPER(I) TETRAFLUOROBORATE 31.6 MILLICURIE: 1 INJECTION, POWDER, LYOPHILIZED, FOR SOLUTION INTRAVENOUS at 10:15

## 2025-04-22 RX ADMIN — TETRAKIS(2-METHOXYISOBUTYLISOCYANIDE)COPPER(I) TETRAFLUOROBORATE 11 MILLICURIE: 1 INJECTION, POWDER, LYOPHILIZED, FOR SOLUTION INTRAVENOUS at 08:10

## 2025-04-22 RX ADMIN — AMINOPHYLLINE 125 MG: 25 INJECTION, SOLUTION INTRAVENOUS at 10:36

## 2025-04-22 RX ADMIN — REGADENOSON 0.4 MG: 0.08 INJECTION, SOLUTION INTRAVENOUS at 10:36

## 2025-04-23 LAB
ECHO BSA: 1.95 M2
EKG DIAGNOSIS: NORMAL
STRESS BASELINE DIAS BP: 80 MMHG
STRESS BASELINE HR: 75 BPM
STRESS BASELINE SYS BP: 131 MMHG
STRESS ESTIMATED WORKLOAD: 1 METS
STRESS EXERCISE DUR MIN: 3 MIN
STRESS PEAK DIAS BP: 81 MMHG
STRESS PEAK SYS BP: 152 MMHG
STRESS PERCENT HR ACHIEVED: 57 %
STRESS POST PEAK HR: 93 BPM
STRESS RATE PRESSURE PRODUCT: NORMAL BPM*MMHG
STRESS STAGE 1 DURATION: 1 MIN:SEC
STRESS STAGE 1 HR: 74 BPM
STRESS STAGE 2 BP: NORMAL MMHG
STRESS STAGE 2 DURATION: 1 MIN:SEC
STRESS STAGE 2 HR: 86 BPM
STRESS STAGE 3 DURATION: 1 MIN:SEC
STRESS STAGE 3 HR: 85 BPM
STRESS STAGE RECOVERY 1 DURATION: 1 MIN:SEC
STRESS STAGE RECOVERY 1 HR: 86 BPM
STRESS STAGE RECOVERY 2 BP: NORMAL MMHG
STRESS STAGE RECOVERY 2 DURATION: 1 MIN:SEC
STRESS STAGE RECOVERY 2 HR: 88 BPM
STRESS STAGE RECOVERY 3 BP: NORMAL MMHG
STRESS STAGE RECOVERY 3 DURATION: 1 MIN:SEC
STRESS STAGE RECOVERY 3 HR: 87 BPM
STRESS TARGET HR: 163 BPM

## 2025-05-12 ENCOUNTER — APPOINTMENT (OUTPATIENT)
Facility: HOSPITAL | Age: 60
DRG: 312 | End: 2025-05-12
Payer: MEDICARE

## 2025-05-12 ENCOUNTER — HOSPITAL ENCOUNTER (INPATIENT)
Facility: HOSPITAL | Age: 60
LOS: 5 days | Discharge: LEFT BEFORE DISCHARGE | DRG: 312 | End: 2025-05-18
Attending: EMERGENCY MEDICINE | Admitting: STUDENT IN AN ORGANIZED HEALTH CARE EDUCATION/TRAINING PROGRAM
Payer: MEDICARE

## 2025-05-12 DIAGNOSIS — Z99.2 ESRD (END STAGE RENAL DISEASE) ON DIALYSIS (HCC): ICD-10-CM

## 2025-05-12 DIAGNOSIS — N18.6 ESRD (END STAGE RENAL DISEASE) ON DIALYSIS (HCC): ICD-10-CM

## 2025-05-12 DIAGNOSIS — R55 CONVULSIVE SYNCOPE: ICD-10-CM

## 2025-05-12 DIAGNOSIS — R55 SYNCOPE AND COLLAPSE: Primary | ICD-10-CM

## 2025-05-12 DIAGNOSIS — R09.89 LABILE BLOOD PRESSURE: ICD-10-CM

## 2025-05-12 DIAGNOSIS — I35.0 NONRHEUMATIC AORTIC VALVE STENOSIS: ICD-10-CM

## 2025-05-12 LAB
ALBUMIN SERPL-MCNC: 3.7 G/DL (ref 3.5–5)
ALBUMIN/GLOB SERPL: 0.8 (ref 1.1–2.2)
ALP SERPL-CCNC: 89 U/L (ref 45–117)
ALT SERPL-CCNC: 28 U/L (ref 12–78)
ANION GAP SERPL CALC-SCNC: 4 MMOL/L (ref 2–12)
AST SERPL-CCNC: 24 U/L (ref 15–37)
BASOPHILS # BLD: 0.04 K/UL (ref 0–0.1)
BASOPHILS NFR BLD: 0.6 % (ref 0–1)
BILIRUB SERPL-MCNC: 0.3 MG/DL (ref 0.2–1)
BUN SERPL-MCNC: 23 MG/DL (ref 6–20)
BUN/CREAT SERPL: 4 (ref 12–20)
CALCIUM SERPL-MCNC: 8.9 MG/DL (ref 8.5–10.1)
CHLORIDE SERPL-SCNC: 101 MMOL/L (ref 97–108)
CO2 SERPL-SCNC: 31 MMOL/L (ref 21–32)
CREAT SERPL-MCNC: 5.23 MG/DL (ref 0.7–1.3)
DIFFERENTIAL METHOD BLD: ABNORMAL
EKG ATRIAL RATE: 93 BPM
EKG DIAGNOSIS: NORMAL
EKG P AXIS: 88 DEGREES
EKG P-R INTERVAL: 152 MS
EKG Q-T INTERVAL: 372 MS
EKG QRS DURATION: 80 MS
EKG QTC CALCULATION (BAZETT): 462 MS
EKG R AXIS: -27 DEGREES
EKG T AXIS: 90 DEGREES
EKG VENTRICULAR RATE: 93 BPM
EOSINOPHIL # BLD: 0.09 K/UL (ref 0–0.4)
EOSINOPHIL NFR BLD: 1.3 % (ref 0–7)
ERYTHROCYTE [DISTWIDTH] IN BLOOD BY AUTOMATED COUNT: 12.6 % (ref 11.5–14.5)
GLOBULIN SER CALC-MCNC: 4.4 G/DL (ref 2–4)
GLUCOSE SERPL-MCNC: 139 MG/DL (ref 65–100)
HCT VFR BLD AUTO: 33.5 % (ref 36.6–50.3)
HGB BLD-MCNC: 10.8 G/DL (ref 12.1–17)
IMM GRANULOCYTES # BLD AUTO: 0.06 K/UL (ref 0–0.04)
IMM GRANULOCYTES NFR BLD AUTO: 0.9 % (ref 0–0.5)
LYMPHOCYTES # BLD: 1.74 K/UL (ref 0.8–3.5)
LYMPHOCYTES NFR BLD: 25.4 % (ref 12–49)
MCH RBC QN AUTO: 29.5 PG (ref 26–34)
MCHC RBC AUTO-ENTMCNC: 32.2 G/DL (ref 30–36.5)
MCV RBC AUTO: 91.5 FL (ref 80–99)
MONOCYTES # BLD: 0.32 K/UL (ref 0–1)
MONOCYTES NFR BLD: 4.7 % (ref 5–13)
NEUTS SEG # BLD: 4.6 K/UL (ref 1.8–8)
NEUTS SEG NFR BLD: 67.1 % (ref 32–75)
NRBC # BLD: 0 K/UL (ref 0–0.01)
NRBC BLD-RTO: 0 PER 100 WBC
PLATELET # BLD AUTO: 250 K/UL (ref 150–400)
PMV BLD AUTO: 9.4 FL (ref 8.9–12.9)
POTASSIUM SERPL-SCNC: 3.7 MMOL/L (ref 3.5–5.1)
PROT SERPL-MCNC: 8.1 G/DL (ref 6.4–8.2)
RBC # BLD AUTO: 3.66 M/UL (ref 4.1–5.7)
SODIUM SERPL-SCNC: 136 MMOL/L (ref 136–145)
WBC # BLD AUTO: 6.9 K/UL (ref 4.1–11.1)

## 2025-05-12 PROCEDURE — 93005 ELECTROCARDIOGRAM TRACING: CPT | Performed by: EMERGENCY MEDICINE

## 2025-05-12 PROCEDURE — 96361 HYDRATE IV INFUSION ADD-ON: CPT

## 2025-05-12 PROCEDURE — 85025 COMPLETE CBC W/AUTO DIFF WBC: CPT

## 2025-05-12 PROCEDURE — 99285 EMERGENCY DEPT VISIT HI MDM: CPT

## 2025-05-12 PROCEDURE — 6360000002 HC RX W HCPCS: Performed by: EMERGENCY MEDICINE

## 2025-05-12 PROCEDURE — 96374 THER/PROPH/DIAG INJ IV PUSH: CPT

## 2025-05-12 PROCEDURE — 80053 COMPREHEN METABOLIC PANEL: CPT

## 2025-05-12 PROCEDURE — 2580000003 HC RX 258: Performed by: EMERGENCY MEDICINE

## 2025-05-12 PROCEDURE — 36415 COLL VENOUS BLD VENIPUNCTURE: CPT

## 2025-05-12 PROCEDURE — 71046 X-RAY EXAM CHEST 2 VIEWS: CPT

## 2025-05-12 RX ORDER — ACETAMINOPHEN 325 MG/1
650 TABLET ORAL EVERY 4 HOURS PRN
Status: DISCONTINUED | OUTPATIENT
Start: 2025-05-12 | End: 2025-05-13

## 2025-05-12 RX ORDER — ONDANSETRON 2 MG/ML
4 INJECTION INTRAMUSCULAR; INTRAVENOUS EVERY 4 HOURS PRN
Status: DISCONTINUED | OUTPATIENT
Start: 2025-05-12 | End: 2025-05-13

## 2025-05-12 RX ORDER — 0.9 % SODIUM CHLORIDE 0.9 %
500 INTRAVENOUS SOLUTION INTRAVENOUS ONCE
Status: COMPLETED | OUTPATIENT
Start: 2025-05-12 | End: 2025-05-12

## 2025-05-12 RX ORDER — ONDANSETRON 2 MG/ML
4 INJECTION INTRAMUSCULAR; INTRAVENOUS ONCE
Status: COMPLETED | OUTPATIENT
Start: 2025-05-12 | End: 2025-05-12

## 2025-05-12 RX ADMIN — ONDANSETRON 4 MG: 2 INJECTION, SOLUTION INTRAMUSCULAR; INTRAVENOUS at 19:28

## 2025-05-12 RX ADMIN — SODIUM CHLORIDE 500 ML: 0.9 INJECTION, SOLUTION INTRAVENOUS at 19:27

## 2025-05-12 ASSESSMENT — PAIN - FUNCTIONAL ASSESSMENT: PAIN_FUNCTIONAL_ASSESSMENT: NONE - DENIES PAIN

## 2025-05-12 NOTE — ED PROVIDER NOTES
bedside      59-year-old male presenting to the emergency department with syncopal episode after dialysis.  He has history of ESRD on Monday Friday dialysis.  Denies fevers, chills, recent illness, preceding chest pain, shortness of breath, palpitations.  He does endorse lightheadedness upon standing out of the car after getting home from dialysis.  Differential diagnosis includes hypovolemia, electrolyte disturbance, arrhythmia although considered less likely, infection although no signs or symptoms to support infection at this time.  Likely patient had too much volume taken off, he appears hypovolemic on my exam, blood pressure soft upon arrival 101/71.  He is currently on midodrine and family states that blood pressure has been quite labile.  Will check CBC, CMP, EKG, chest x-ray, treat with small IV fluid, check orthostatic vital signs, and reassess.  Consider admission.    Workup unremarkable.  Patient's blood pressure has been very labile going above 200 systolic.  Currently 177/96.  He is unable to stand or ambulate.  Therefore will require admission.    ED Course as of 05/13/25 0340   Mon May 12, 2025   1901 EKG per my interpretation normal sinus rhythm, rate 93 bpm, no acute ischemic changes or interval changes. [AK]   1903 Chest x-ray per my independent interpretation no acute process such as acute infiltrate or pneumothorax, confirmed by radiologist.  R sided TDC in place [AK]      ED Course User Index  [AK] Matthew Simpson MD         Cardiac Monitoring:  The cardiac monitor revealed the following rhythm as interpreted by me: Normal Sinus Rhythm, rate 89 bpm  The cardiac monitor was ordered secondary to the patient's reported complaint of syncope and to monitor the patient for dysrhythmia.  Matthew Simpson MD      FINAL IMPRESSION     1. Syncope and collapse    2. Labile blood pressure    3. ESRD (end stage renal disease) on dialysis (HCC)          DISPOSITION/PLAN     Admission Note:  Patient is being

## 2025-05-13 ENCOUNTER — APPOINTMENT (OUTPATIENT)
Facility: HOSPITAL | Age: 60
DRG: 312 | End: 2025-05-13
Payer: MEDICARE

## 2025-05-13 PROBLEM — R55 SYNCOPE AND COLLAPSE: Status: ACTIVE | Noted: 2025-05-13

## 2025-05-13 LAB
ECHO BSA: 1.91 M2
GLUCOSE BLD STRIP.AUTO-MCNC: 155 MG/DL (ref 65–117)
SERVICE CMNT-IMP: ABNORMAL

## 2025-05-13 PROCEDURE — 70450 CT HEAD/BRAIN W/O DYE: CPT

## 2025-05-13 PROCEDURE — 82962 GLUCOSE BLOOD TEST: CPT

## 2025-05-13 PROCEDURE — 2060000000 HC ICU INTERMEDIATE R&B

## 2025-05-13 PROCEDURE — 6360000002 HC RX W HCPCS: Performed by: INTERNAL MEDICINE

## 2025-05-13 PROCEDURE — 6370000000 HC RX 637 (ALT 250 FOR IP): Performed by: INTERNAL MEDICINE

## 2025-05-13 PROCEDURE — 2500000003 HC RX 250 WO HCPCS: Performed by: INTERNAL MEDICINE

## 2025-05-13 PROCEDURE — 93970 EXTREMITY STUDY: CPT

## 2025-05-13 RX ORDER — ASPIRIN 81 MG/1
81 TABLET, CHEWABLE ORAL DAILY
Status: DISCONTINUED | OUTPATIENT
Start: 2025-05-13 | End: 2025-05-18 | Stop reason: HOSPADM

## 2025-05-13 RX ORDER — DEXTROSE MONOHYDRATE 100 MG/ML
INJECTION, SOLUTION INTRAVENOUS CONTINUOUS PRN
Status: DISCONTINUED | OUTPATIENT
Start: 2025-05-13 | End: 2025-05-18 | Stop reason: HOSPADM

## 2025-05-13 RX ORDER — SODIUM CHLORIDE 0.9 % (FLUSH) 0.9 %
5-40 SYRINGE (ML) INJECTION PRN
Status: DISCONTINUED | OUTPATIENT
Start: 2025-05-13 | End: 2025-05-18 | Stop reason: HOSPADM

## 2025-05-13 RX ORDER — SUCRALFATE 1 G/1
1 TABLET ORAL 4 TIMES DAILY
Status: DISCONTINUED | OUTPATIENT
Start: 2025-05-13 | End: 2025-05-18 | Stop reason: HOSPADM

## 2025-05-13 RX ORDER — SODIUM CHLORIDE 0.9 % (FLUSH) 0.9 %
5-40 SYRINGE (ML) INJECTION EVERY 12 HOURS SCHEDULED
Status: DISCONTINUED | OUTPATIENT
Start: 2025-05-13 | End: 2025-05-18 | Stop reason: HOSPADM

## 2025-05-13 RX ORDER — HEPARIN SODIUM 5000 [USP'U]/ML
5000 INJECTION, SOLUTION INTRAVENOUS; SUBCUTANEOUS EVERY 8 HOURS SCHEDULED
Status: DISCONTINUED | OUTPATIENT
Start: 2025-05-13 | End: 2025-05-18 | Stop reason: HOSPADM

## 2025-05-13 RX ORDER — POLYETHYLENE GLYCOL 3350 17 G/17G
17 POWDER, FOR SOLUTION ORAL DAILY PRN
Status: DISCONTINUED | OUTPATIENT
Start: 2025-05-13 | End: 2025-05-18 | Stop reason: HOSPADM

## 2025-05-13 RX ORDER — MIDODRINE HYDROCHLORIDE 5 MG/1
2.5 TABLET ORAL
Status: DISCONTINUED | OUTPATIENT
Start: 2025-05-14 | End: 2025-05-17

## 2025-05-13 RX ORDER — GLUCAGON 1 MG/ML
1 KIT INJECTION PRN
Status: DISCONTINUED | OUTPATIENT
Start: 2025-05-13 | End: 2025-05-18 | Stop reason: HOSPADM

## 2025-05-13 RX ORDER — FLUOXETINE 10 MG/1
10 CAPSULE ORAL DAILY
Status: DISCONTINUED | OUTPATIENT
Start: 2025-05-13 | End: 2025-05-18 | Stop reason: HOSPADM

## 2025-05-13 RX ORDER — ACETAMINOPHEN 325 MG/1
650 TABLET ORAL EVERY 6 HOURS PRN
Status: DISCONTINUED | OUTPATIENT
Start: 2025-05-13 | End: 2025-05-18 | Stop reason: HOSPADM

## 2025-05-13 RX ORDER — VITAMIN B COMPLEX
2000 TABLET ORAL DAILY
Status: DISCONTINUED | OUTPATIENT
Start: 2025-05-13 | End: 2025-05-18 | Stop reason: HOSPADM

## 2025-05-13 RX ORDER — AMLODIPINE BESYLATE 5 MG/1
10 TABLET ORAL DAILY
Status: DISCONTINUED | OUTPATIENT
Start: 2025-05-13 | End: 2025-05-18 | Stop reason: HOSPADM

## 2025-05-13 RX ORDER — SODIUM CHLORIDE 9 MG/ML
INJECTION, SOLUTION INTRAVENOUS PRN
Status: DISCONTINUED | OUTPATIENT
Start: 2025-05-13 | End: 2025-05-18 | Stop reason: HOSPADM

## 2025-05-13 RX ORDER — ONDANSETRON 4 MG/1
4 TABLET, ORALLY DISINTEGRATING ORAL EVERY 8 HOURS PRN
Status: DISCONTINUED | OUTPATIENT
Start: 2025-05-13 | End: 2025-05-18 | Stop reason: HOSPADM

## 2025-05-13 RX ORDER — ACETAMINOPHEN 650 MG/1
650 SUPPOSITORY RECTAL EVERY 6 HOURS PRN
Status: DISCONTINUED | OUTPATIENT
Start: 2025-05-13 | End: 2025-05-18 | Stop reason: HOSPADM

## 2025-05-13 RX ORDER — ONDANSETRON 2 MG/ML
4 INJECTION INTRAMUSCULAR; INTRAVENOUS EVERY 6 HOURS PRN
Status: DISCONTINUED | OUTPATIENT
Start: 2025-05-13 | End: 2025-05-18 | Stop reason: HOSPADM

## 2025-05-13 RX ORDER — INSULIN LISPRO 100 [IU]/ML
0-8 INJECTION, SOLUTION INTRAVENOUS; SUBCUTANEOUS
Status: DISCONTINUED | OUTPATIENT
Start: 2025-05-13 | End: 2025-05-18 | Stop reason: HOSPADM

## 2025-05-13 RX ORDER — ATORVASTATIN CALCIUM 40 MG/1
40 TABLET, FILM COATED ORAL NIGHTLY
Status: DISCONTINUED | OUTPATIENT
Start: 2025-05-13 | End: 2025-05-18 | Stop reason: HOSPADM

## 2025-05-13 RX ADMIN — HEPARIN SODIUM 5000 UNITS: 5000 INJECTION INTRAVENOUS; SUBCUTANEOUS at 09:42

## 2025-05-13 RX ADMIN — SUCRALFATE 1 G: 1 TABLET ORAL at 09:32

## 2025-05-13 RX ADMIN — AMLODIPINE BESYLATE 10 MG: 5 TABLET ORAL at 09:32

## 2025-05-13 RX ADMIN — SUCRALFATE 1 G: 1 TABLET ORAL at 18:42

## 2025-05-13 RX ADMIN — SUCRALFATE 1 G: 1 TABLET ORAL at 13:25

## 2025-05-13 RX ADMIN — ATORVASTATIN CALCIUM 40 MG: 40 TABLET, FILM COATED ORAL at 22:07

## 2025-05-13 RX ADMIN — SODIUM CHLORIDE, PRESERVATIVE FREE 10 ML: 5 INJECTION INTRAVENOUS at 22:08

## 2025-05-13 RX ADMIN — SODIUM CHLORIDE, PRESERVATIVE FREE 10 ML: 5 INJECTION INTRAVENOUS at 09:38

## 2025-05-13 RX ADMIN — HEPARIN SODIUM 5000 UNITS: 5000 INJECTION INTRAVENOUS; SUBCUTANEOUS at 22:08

## 2025-05-13 RX ADMIN — FLUOXETINE HYDROCHLORIDE 10 MG: 10 CAPSULE ORAL at 09:32

## 2025-05-13 RX ADMIN — ASPIRIN 81 MG: 81 TABLET, CHEWABLE ORAL at 09:32

## 2025-05-13 RX ADMIN — Medication 2000 UNITS: at 09:32

## 2025-05-13 RX ADMIN — SUCRALFATE 1 G: 1 TABLET ORAL at 22:07

## 2025-05-13 NOTE — ED NOTES
TRANSFER - OUT REPORT:    Verbal report given to Narda SHAW on Gerald Yuen Jr.  being transferred to [facility] for routine progression of patient care       Report consisted of patient's Situation, Background, Assessment and   Recommendations(SBAR).     Information from the following report(s) Nurse Handoff Report, Index, ED Encounter Summary, ED SBAR, Adult Overview, MAR, Recent Results, and Cardiac Rhythm NSR  was reviewed with the receiving nurse.    Mc[place]ough Fall Assessment:    Presents to emergency department  because of falls (Syncope, seizure, or loss of consciousness): No  Age > 70: No  Altered Mental Status, Intoxication with alcohol or substance confusion (Disorientation, impaired judgment, poor safety awaremess, or inability to follow instructions): No  Impaired Mobility: Ambulates or transfers with assistive devices or assistance; Unable to ambulate or transer.: Yes  Nursing Judgement: Yes          Lines:   Peripheral IV 05/12/25 Left Antecubital (Active)   Site Assessment Clean, dry & intact 05/12/25 1742   Line Status Blood return noted 05/12/25 1742   Line Care Line pulled back 05/12/25 1742   Phlebitis Assessment No symptoms 05/12/25 1742   Infiltration Assessment 0 05/12/25 1742   Dressing Status New dressing applied;Clean, dry & intact 05/12/25 1742   Dressing Type Transparent 05/12/25 1742   Dressing Intervention New 05/12/25 1742       Hemodialysis Central Access Right Neck (Active)        Opportunity for questions and clarification was provided.      Patient transported with:  Monitor and Registered Nurse

## 2025-05-13 NOTE — H&P
pain during the stress test. Blood pressure demonstrated a normal response and heart rate demonstrated a normal response to stress. The patient's heart rate recovery was normal. INDICATION;  Chest pain, preop evaluation A Lexiscan myocardial SPECT gated wall motion study was performed utilizing 11 mCi of Tc Cardiolite for rest and 31.6 mCi for stress. SPECT imaging at stress and rest demonstrates no definite evidence of ischemia and/or infarction. Left ventricular ejection fraction equals 53%. Left ventricular wall motion and thickening are within normal limits.     1. No definite evidence of ischemia and/or infarction. 2. LVEF equals 53%. Left ventricular wall motion and thickening is normal.     _______________________________________________________________________    TOTAL TIME:  76 Minutes    Critical Care Provided     Minutes non procedure based    Signed: Jimmy Henriquez MD    Procedures: see electronic medical records for all procedures/Xrays and details which were not copied into this note but were reviewed prior to creation of Plan.

## 2025-05-13 NOTE — CONSULTS
[facility]         NAME:Gerald Yuen Jr.  MRN:[id number]   :1965        Patient seen  Esrd  S/p hd yesterday    He can't recall his hd unit  He was recently discharged from [facility]  He is not getting hd at [facility] unit      DR. CHAMBERLAIN WILL SEE HIM TOMORROW.      Blood pressure 133/72, pulse 87, temperature 99 °F (37.2 °C), temperature source Oral, resp. rate 19, height 1.88 m (6' 2\"), weight 70 kg (154 lb 5.2 oz), SpO2 99%.    Syncope and collapse [R55]  ESRD (end stage renal disease) on dialysis (HCC) [N18.6, Z99.2]  Labile blood pressure [R09.89]      Thank You.    Guru Villanueva MD

## 2025-05-13 NOTE — ED NOTES
Attempted to ambulate pt at this time and pt became very dizzy and lightheaded. Pt was sat down back on the bed and placed on the monitor x 3. MD notified w the decision to admit.

## 2025-05-14 LAB
ANION GAP SERPL CALC-SCNC: 8 MMOL/L (ref 2–12)
BASOPHILS # BLD: 0.03 K/UL (ref 0–0.1)
BASOPHILS NFR BLD: 0.4 % (ref 0–1)
BUN SERPL-MCNC: 48 MG/DL (ref 6–20)
BUN/CREAT SERPL: 6 (ref 12–20)
CALCIUM SERPL-MCNC: 8.5 MG/DL (ref 8.5–10.1)
CHLORIDE SERPL-SCNC: 102 MMOL/L (ref 97–108)
CO2 SERPL-SCNC: 25 MMOL/L (ref 21–32)
CREAT SERPL-MCNC: 8.15 MG/DL (ref 0.7–1.3)
DIFFERENTIAL METHOD BLD: ABNORMAL
EOSINOPHIL # BLD: 0.16 K/UL (ref 0–0.4)
EOSINOPHIL NFR BLD: 2.4 % (ref 0–7)
ERYTHROCYTE [DISTWIDTH] IN BLOOD BY AUTOMATED COUNT: 12.7 % (ref 11.5–14.5)
GLUCOSE BLD STRIP.AUTO-MCNC: 132 MG/DL (ref 65–117)
GLUCOSE BLD STRIP.AUTO-MCNC: 137 MG/DL (ref 65–117)
GLUCOSE BLD STRIP.AUTO-MCNC: 160 MG/DL (ref 65–117)
GLUCOSE BLD STRIP.AUTO-MCNC: 214 MG/DL (ref 65–117)
GLUCOSE SERPL-MCNC: 126 MG/DL (ref 65–100)
HCT VFR BLD AUTO: 31 % (ref 36.6–50.3)
HGB BLD-MCNC: 10.2 G/DL (ref 12.1–17)
IMM GRANULOCYTES # BLD AUTO: 0.04 K/UL (ref 0–0.04)
IMM GRANULOCYTES NFR BLD AUTO: 0.6 % (ref 0–0.5)
LYMPHOCYTES # BLD: 1.67 K/UL (ref 0.8–3.5)
LYMPHOCYTES NFR BLD: 24.6 % (ref 12–49)
MCH RBC QN AUTO: 30.4 PG (ref 26–34)
MCHC RBC AUTO-ENTMCNC: 32.9 G/DL (ref 30–36.5)
MCV RBC AUTO: 92.5 FL (ref 80–99)
MONOCYTES # BLD: 0.54 K/UL (ref 0–1)
MONOCYTES NFR BLD: 7.9 % (ref 5–13)
NEUTS SEG # BLD: 4.36 K/UL (ref 1.8–8)
NEUTS SEG NFR BLD: 64.1 % (ref 32–75)
NRBC # BLD: 0 K/UL (ref 0–0.01)
NRBC BLD-RTO: 0 PER 100 WBC
PLATELET # BLD AUTO: 226 K/UL (ref 150–400)
PMV BLD AUTO: 9.6 FL (ref 8.9–12.9)
POTASSIUM SERPL-SCNC: 4.2 MMOL/L (ref 3.5–5.1)
RBC # BLD AUTO: 3.35 M/UL (ref 4.1–5.7)
SERVICE CMNT-IMP: ABNORMAL
SODIUM SERPL-SCNC: 135 MMOL/L (ref 136–145)
TROPONIN I SERPL HS-MCNC: 26 NG/L (ref 0–76)
WBC # BLD AUTO: 6.8 K/UL (ref 4.1–11.1)

## 2025-05-14 PROCEDURE — 6370000000 HC RX 637 (ALT 250 FOR IP): Performed by: INTERNAL MEDICINE

## 2025-05-14 PROCEDURE — 36415 COLL VENOUS BLD VENIPUNCTURE: CPT

## 2025-05-14 PROCEDURE — 84484 ASSAY OF TROPONIN QUANT: CPT

## 2025-05-14 PROCEDURE — 6360000002 HC RX W HCPCS: Performed by: INTERNAL MEDICINE

## 2025-05-14 PROCEDURE — 2060000000 HC ICU INTERMEDIATE R&B

## 2025-05-14 PROCEDURE — 97165 OT EVAL LOW COMPLEX 30 MIN: CPT | Performed by: OCCUPATIONAL THERAPIST

## 2025-05-14 PROCEDURE — 97530 THERAPEUTIC ACTIVITIES: CPT

## 2025-05-14 PROCEDURE — 85025 COMPLETE CBC W/AUTO DIFF WBC: CPT

## 2025-05-14 PROCEDURE — 80048 BASIC METABOLIC PNL TOTAL CA: CPT

## 2025-05-14 PROCEDURE — 97161 PT EVAL LOW COMPLEX 20 MIN: CPT

## 2025-05-14 PROCEDURE — 97530 THERAPEUTIC ACTIVITIES: CPT | Performed by: OCCUPATIONAL THERAPIST

## 2025-05-14 PROCEDURE — 2500000003 HC RX 250 WO HCPCS: Performed by: INTERNAL MEDICINE

## 2025-05-14 PROCEDURE — 82962 GLUCOSE BLOOD TEST: CPT

## 2025-05-14 RX ADMIN — ASPIRIN 81 MG: 81 TABLET, CHEWABLE ORAL at 08:20

## 2025-05-14 RX ADMIN — SODIUM CHLORIDE, PRESERVATIVE FREE 10 ML: 5 INJECTION INTRAVENOUS at 20:52

## 2025-05-14 RX ADMIN — HEPARIN SODIUM 5000 UNITS: 5000 INJECTION INTRAVENOUS; SUBCUTANEOUS at 06:31

## 2025-05-14 RX ADMIN — AMLODIPINE BESYLATE 10 MG: 5 TABLET ORAL at 08:20

## 2025-05-14 RX ADMIN — SUCRALFATE 1 G: 1 TABLET ORAL at 08:20

## 2025-05-14 RX ADMIN — SUCRALFATE 1 G: 1 TABLET ORAL at 13:44

## 2025-05-14 RX ADMIN — FLUOXETINE HYDROCHLORIDE 10 MG: 10 CAPSULE ORAL at 08:20

## 2025-05-14 RX ADMIN — SODIUM CHLORIDE, PRESERVATIVE FREE 10 ML: 5 INJECTION INTRAVENOUS at 08:26

## 2025-05-14 RX ADMIN — ATORVASTATIN CALCIUM 40 MG: 40 TABLET, FILM COATED ORAL at 20:51

## 2025-05-14 RX ADMIN — INSULIN LISPRO 2 UNITS: 100 INJECTION, SOLUTION INTRAVENOUS; SUBCUTANEOUS at 22:30

## 2025-05-14 RX ADMIN — HEPARIN SODIUM 5000 UNITS: 5000 INJECTION INTRAVENOUS; SUBCUTANEOUS at 20:51

## 2025-05-14 RX ADMIN — HEPARIN SODIUM 5000 UNITS: 5000 INJECTION INTRAVENOUS; SUBCUTANEOUS at 13:45

## 2025-05-14 RX ADMIN — MIDODRINE HYDROCHLORIDE 2.5 MG: 5 TABLET ORAL at 17:14

## 2025-05-14 RX ADMIN — SUCRALFATE 1 G: 1 TABLET ORAL at 20:51

## 2025-05-14 RX ADMIN — Medication 2000 UNITS: at 08:20

## 2025-05-14 RX ADMIN — SUCRALFATE 1 G: 1 TABLET ORAL at 17:14

## 2025-05-15 ENCOUNTER — APPOINTMENT (OUTPATIENT)
Facility: HOSPITAL | Age: 60
DRG: 312 | End: 2025-05-15
Attending: INTERNAL MEDICINE
Payer: MEDICARE

## 2025-05-15 LAB
ANION GAP SERPL CALC-SCNC: 9 MMOL/L (ref 2–12)
BASOPHILS # BLD: 0.04 K/UL (ref 0–0.1)
BASOPHILS NFR BLD: 0.7 % (ref 0–1)
BUN SERPL-MCNC: 55 MG/DL (ref 6–20)
BUN/CREAT SERPL: 6 (ref 12–20)
CALCIUM SERPL-MCNC: 8.5 MG/DL (ref 8.5–10.1)
CHLORIDE SERPL-SCNC: 104 MMOL/L (ref 97–108)
CO2 SERPL-SCNC: 23 MMOL/L (ref 21–32)
CORTIS AM PEAK SERPL-MCNC: 12.7 UG/DL (ref 4.3–22.45)
CREAT SERPL-MCNC: 9.03 MG/DL (ref 0.7–1.3)
DIFFERENTIAL METHOD BLD: ABNORMAL
EOSINOPHIL # BLD: 0.16 K/UL (ref 0–0.4)
EOSINOPHIL NFR BLD: 2.7 % (ref 0–7)
ERYTHROCYTE [DISTWIDTH] IN BLOOD BY AUTOMATED COUNT: 12.6 % (ref 11.5–14.5)
GLUCOSE BLD STRIP.AUTO-MCNC: 124 MG/DL (ref 65–117)
GLUCOSE BLD STRIP.AUTO-MCNC: 130 MG/DL (ref 65–117)
GLUCOSE BLD STRIP.AUTO-MCNC: 140 MG/DL (ref 65–117)
GLUCOSE BLD STRIP.AUTO-MCNC: 188 MG/DL (ref 65–117)
GLUCOSE SERPL-MCNC: 117 MG/DL (ref 65–100)
HCT VFR BLD AUTO: 30.3 % (ref 36.6–50.3)
HGB BLD-MCNC: 9.7 G/DL (ref 12.1–17)
IMM GRANULOCYTES # BLD AUTO: 0.02 K/UL (ref 0–0.04)
IMM GRANULOCYTES NFR BLD AUTO: 0.3 % (ref 0–0.5)
LYMPHOCYTES # BLD: 1.84 K/UL (ref 0.8–3.5)
LYMPHOCYTES NFR BLD: 31.2 % (ref 12–49)
MAGNESIUM SERPL-MCNC: 2.3 MG/DL (ref 1.6–2.4)
MCH RBC QN AUTO: 29.1 PG (ref 26–34)
MCHC RBC AUTO-ENTMCNC: 32 G/DL (ref 30–36.5)
MCV RBC AUTO: 91 FL (ref 80–99)
MONOCYTES # BLD: 0.45 K/UL (ref 0–1)
MONOCYTES NFR BLD: 7.6 % (ref 5–13)
NEUTS SEG # BLD: 3.39 K/UL (ref 1.8–8)
NEUTS SEG NFR BLD: 57.5 % (ref 32–75)
NRBC # BLD: 0 K/UL (ref 0–0.01)
NRBC BLD-RTO: 0 PER 100 WBC
PHOSPHATE SERPL-MCNC: 5.5 MG/DL (ref 2.6–4.7)
PLATELET # BLD AUTO: 224 K/UL (ref 150–400)
PMV BLD AUTO: 9.4 FL (ref 8.9–12.9)
POTASSIUM SERPL-SCNC: 4.3 MMOL/L (ref 3.5–5.1)
RBC # BLD AUTO: 3.33 M/UL (ref 4.1–5.7)
SERVICE CMNT-IMP: ABNORMAL
SODIUM SERPL-SCNC: 136 MMOL/L (ref 136–145)
WBC # BLD AUTO: 5.9 K/UL (ref 4.1–11.1)

## 2025-05-15 PROCEDURE — 2060000000 HC ICU INTERMEDIATE R&B

## 2025-05-15 PROCEDURE — 6370000000 HC RX 637 (ALT 250 FOR IP): Performed by: INTERNAL MEDICINE

## 2025-05-15 PROCEDURE — 85025 COMPLETE CBC W/AUTO DIFF WBC: CPT

## 2025-05-15 PROCEDURE — 82962 GLUCOSE BLOOD TEST: CPT

## 2025-05-15 PROCEDURE — 83735 ASSAY OF MAGNESIUM: CPT

## 2025-05-15 PROCEDURE — 2580000003 HC RX 258: Performed by: STUDENT IN AN ORGANIZED HEALTH CARE EDUCATION/TRAINING PROGRAM

## 2025-05-15 PROCEDURE — 97530 THERAPEUTIC ACTIVITIES: CPT

## 2025-05-15 PROCEDURE — 93306 TTE W/DOPPLER COMPLETE: CPT

## 2025-05-15 PROCEDURE — 6360000002 HC RX W HCPCS: Performed by: INTERNAL MEDICINE

## 2025-05-15 PROCEDURE — 80048 BASIC METABOLIC PNL TOTAL CA: CPT

## 2025-05-15 PROCEDURE — 82533 TOTAL CORTISOL: CPT

## 2025-05-15 PROCEDURE — 84100 ASSAY OF PHOSPHORUS: CPT

## 2025-05-15 PROCEDURE — 36415 COLL VENOUS BLD VENIPUNCTURE: CPT

## 2025-05-15 PROCEDURE — 2500000003 HC RX 250 WO HCPCS: Performed by: INTERNAL MEDICINE

## 2025-05-15 RX ORDER — SODIUM CHLORIDE 9 MG/ML
INJECTION, SOLUTION INTRAVENOUS CONTINUOUS
Status: ACTIVE | OUTPATIENT
Start: 2025-05-15 | End: 2025-05-15

## 2025-05-15 RX ORDER — COSYNTROPIN 0.25 MG/ML
250 INJECTION, POWDER, FOR SOLUTION INTRAMUSCULAR; INTRAVENOUS ONCE
Status: COMPLETED | OUTPATIENT
Start: 2025-05-16 | End: 2025-05-16

## 2025-05-15 RX ADMIN — SODIUM CHLORIDE, PRESERVATIVE FREE 10 ML: 5 INJECTION INTRAVENOUS at 09:30

## 2025-05-15 RX ADMIN — ATORVASTATIN CALCIUM 40 MG: 40 TABLET, FILM COATED ORAL at 21:53

## 2025-05-15 RX ADMIN — SUCRALFATE 1 G: 1 TABLET ORAL at 16:28

## 2025-05-15 RX ADMIN — SODIUM CHLORIDE: 0.9 INJECTION, SOLUTION INTRAVENOUS at 16:22

## 2025-05-15 RX ADMIN — AMLODIPINE BESYLATE 10 MG: 5 TABLET ORAL at 09:29

## 2025-05-15 RX ADMIN — HEPARIN SODIUM 5000 UNITS: 5000 INJECTION INTRAVENOUS; SUBCUTANEOUS at 13:10

## 2025-05-15 RX ADMIN — ASPIRIN 81 MG: 81 TABLET, CHEWABLE ORAL at 09:29

## 2025-05-15 RX ADMIN — SUCRALFATE 1 G: 1 TABLET ORAL at 09:29

## 2025-05-15 RX ADMIN — SUCRALFATE 1 G: 1 TABLET ORAL at 21:53

## 2025-05-15 RX ADMIN — FLUOXETINE HYDROCHLORIDE 10 MG: 10 CAPSULE ORAL at 09:29

## 2025-05-15 RX ADMIN — INSULIN LISPRO 2 UNITS: 100 INJECTION, SOLUTION INTRAVENOUS; SUBCUTANEOUS at 21:54

## 2025-05-15 RX ADMIN — SUCRALFATE 1 G: 1 TABLET ORAL at 13:09

## 2025-05-15 RX ADMIN — SODIUM CHLORIDE, PRESERVATIVE FREE 10 ML: 5 INJECTION INTRAVENOUS at 21:55

## 2025-05-15 RX ADMIN — HEPARIN SODIUM 5000 UNITS: 5000 INJECTION INTRAVENOUS; SUBCUTANEOUS at 21:54

## 2025-05-15 RX ADMIN — Medication 2000 UNITS: at 09:29

## 2025-05-16 LAB
ANION GAP SERPL CALC-SCNC: 9 MMOL/L (ref 2–12)
BASOPHILS # BLD: 0.03 K/UL (ref 0–0.1)
BASOPHILS NFR BLD: 0.5 % (ref 0–1)
BUN SERPL-MCNC: 67 MG/DL (ref 6–20)
BUN/CREAT SERPL: 7 (ref 12–20)
CALCIUM SERPL-MCNC: 8.3 MG/DL (ref 8.5–10.1)
CHLORIDE SERPL-SCNC: 104 MMOL/L (ref 97–108)
CO2 SERPL-SCNC: 24 MMOL/L (ref 21–32)
CORTIS SERPL-MCNC: 13.5 UG/DL
CORTIS SERPL-MCNC: 21.7 UG/DL
CORTIS SERPL-MCNC: 27.5 UG/DL
CREAT SERPL-MCNC: 9.4 MG/DL (ref 0.7–1.3)
DIFFERENTIAL METHOD BLD: ABNORMAL
ECHO AV AREA PEAK VELOCITY: 3.1 CM2
ECHO AV AREA VTI: 3.2 CM2
ECHO AV AREA/BSA PEAK VELOCITY: 1.6 CM2/M2
ECHO AV AREA/BSA VTI: 1.6 CM2/M2
ECHO AV CUSP MM: 1.8 CM
ECHO AV MEAN GRADIENT: 3 MMHG
ECHO AV MEAN VELOCITY: 0.8 M/S
ECHO AV PEAK GRADIENT: 5 MMHG
ECHO AV PEAK VELOCITY: 1.1 M/S
ECHO AV VELOCITY RATIO: 1
ECHO AV VTI: 23.9 CM
ECHO BSA: 1.92 M2
ECHO EST RA PRESSURE: 3 MMHG
ECHO LA DIAMETER INDEX: 1.99 CM/M2
ECHO LA DIAMETER: 3.9 CM
ECHO LA VOL A-L A2C: 67 ML (ref 18–58)
ECHO LA VOL A-L A4C: 34 ML (ref 18–58)
ECHO LA VOL MOD A2C: 63 ML (ref 18–58)
ECHO LA VOL MOD A4C: 32 ML (ref 18–58)
ECHO LA VOLUME AREA LENGTH: 50 ML
ECHO LA VOLUME INDEX A-L A2C: 34 ML/M2 (ref 16–34)
ECHO LA VOLUME INDEX A-L A4C: 17 ML/M2 (ref 16–34)
ECHO LA VOLUME INDEX AREA LENGTH: 26 ML/M2 (ref 16–34)
ECHO LA VOLUME INDEX MOD A2C: 32 ML/M2 (ref 16–34)
ECHO LA VOLUME INDEX MOD A4C: 16 ML/M2 (ref 16–34)
ECHO LV E' LATERAL VELOCITY: 6.85 CM/S
ECHO LV E' SEPTAL VELOCITY: 6.78 CM/S
ECHO LV EDV A4C: 51 ML
ECHO LV EDV INDEX A4C: 26 ML/M2
ECHO LV EJECTION FRACTION A4C: 53 %
ECHO LV FRACTIONAL SHORTENING: 26 % (ref 28–44)
ECHO LV INTERNAL DIMENSION DIASTOLE INDEX: 1.94 CM/M2
ECHO LV INTERNAL DIMENSION DIASTOLIC: 3.8 CM (ref 4.2–5.9)
ECHO LV INTERNAL DIMENSION SYSTOLIC INDEX: 1.43 CM/M2
ECHO LV INTERNAL DIMENSION SYSTOLIC: 2.8 CM
ECHO LV IVSD: 1.3 CM (ref 0.6–1)
ECHO LV MASS 2D: 163 G (ref 88–224)
ECHO LV MASS INDEX 2D: 83.2 G/M2 (ref 49–115)
ECHO LV POSTERIOR WALL DIASTOLIC: 1.2 CM (ref 0.6–1)
ECHO LV RELATIVE WALL THICKNESS RATIO: 0.63
ECHO LVOT AREA: 3.1 CM2
ECHO LVOT AV VTI INDEX: 0.98
ECHO LVOT DIAM: 2 CM
ECHO LVOT MEAN GRADIENT: 3 MMHG
ECHO LVOT PEAK GRADIENT: 5 MMHG
ECHO LVOT PEAK VELOCITY: 1.1 M/S
ECHO LVOT STROKE VOLUME INDEX: 37.6 ML/M2
ECHO LVOT SV: 73.8 ML
ECHO LVOT VTI: 23.5 CM
ECHO MV A VELOCITY: 0.96 M/S
ECHO MV AREA VTI: 3.3 CM2
ECHO MV E DECELERATION TIME (DT): 215.9 MS
ECHO MV E VELOCITY: 0.63 M/S
ECHO MV E/A RATIO: 0.66
ECHO MV E/E' LATERAL: 9.2
ECHO MV E/E' RATIO (AVERAGED): 9.24
ECHO MV E/E' SEPTAL: 9.29
ECHO MV LVOT VTI INDEX: 0.95
ECHO MV MAX VELOCITY: 1.1 M/S
ECHO MV MEAN GRADIENT: 2 MMHG
ECHO MV MEAN VELOCITY: 0.7 M/S
ECHO MV PEAK GRADIENT: 5 MMHG
ECHO MV VTI: 22.3 CM
ECHO RA VOLUME: 14 ML
ECHO RA VOLUME: 14 ML
ECHO RV FREE WALL PEAK S': 14.5 CM/S
ECHO RV INTERNAL DIMENSION: 3.5 CM
ECHO RV TAPSE: 2.6 CM (ref 1.7–?)
EOSINOPHIL # BLD: 0.15 K/UL (ref 0–0.4)
EOSINOPHIL NFR BLD: 2.4 % (ref 0–7)
ERYTHROCYTE [DISTWIDTH] IN BLOOD BY AUTOMATED COUNT: 12.5 % (ref 11.5–14.5)
GLUCOSE BLD STRIP.AUTO-MCNC: 161 MG/DL (ref 65–117)
GLUCOSE BLD STRIP.AUTO-MCNC: 173 MG/DL (ref 65–117)
GLUCOSE BLD STRIP.AUTO-MCNC: 176 MG/DL (ref 65–117)
GLUCOSE BLD STRIP.AUTO-MCNC: 216 MG/DL (ref 65–117)
GLUCOSE SERPL-MCNC: 143 MG/DL (ref 65–100)
HBV SURFACE AB SER QL: NONREACTIVE
HBV SURFACE AB SER-ACNC: 7.75 MIU/ML
HCT VFR BLD AUTO: 28.3 % (ref 36.6–50.3)
HGB BLD-MCNC: 9.1 G/DL (ref 12.1–17)
IMM GRANULOCYTES # BLD AUTO: 0.02 K/UL (ref 0–0.04)
IMM GRANULOCYTES NFR BLD AUTO: 0.3 % (ref 0–0.5)
LYMPHOCYTES # BLD: 1.73 K/UL (ref 0.8–3.5)
LYMPHOCYTES NFR BLD: 27.1 % (ref 12–49)
MAGNESIUM SERPL-MCNC: 2.4 MG/DL (ref 1.6–2.4)
MCH RBC QN AUTO: 29.8 PG (ref 26–34)
MCHC RBC AUTO-ENTMCNC: 32.2 G/DL (ref 30–36.5)
MCV RBC AUTO: 92.8 FL (ref 80–99)
MONOCYTES # BLD: 0.56 K/UL (ref 0–1)
MONOCYTES NFR BLD: 8.8 % (ref 5–13)
NEUTS SEG # BLD: 3.89 K/UL (ref 1.8–8)
NEUTS SEG NFR BLD: 60.9 % (ref 32–75)
NRBC # BLD: 0 K/UL (ref 0–0.01)
NRBC BLD-RTO: 0 PER 100 WBC
PHOSPHATE SERPL-MCNC: 4.9 MG/DL (ref 2.6–4.7)
PLATELET # BLD AUTO: 219 K/UL (ref 150–400)
PMV BLD AUTO: 9.6 FL (ref 8.9–12.9)
POTASSIUM SERPL-SCNC: 4.2 MMOL/L (ref 3.5–5.1)
RBC # BLD AUTO: 3.05 M/UL (ref 4.1–5.7)
SERVICE CMNT-IMP: ABNORMAL
SODIUM SERPL-SCNC: 137 MMOL/L (ref 136–145)
WBC # BLD AUTO: 6.4 K/UL (ref 4.1–11.1)

## 2025-05-16 PROCEDURE — 86706 HEP B SURFACE ANTIBODY: CPT

## 2025-05-16 PROCEDURE — 2500000003 HC RX 250 WO HCPCS: Performed by: INTERNAL MEDICINE

## 2025-05-16 PROCEDURE — 6370000000 HC RX 637 (ALT 250 FOR IP): Performed by: INTERNAL MEDICINE

## 2025-05-16 PROCEDURE — 97530 THERAPEUTIC ACTIVITIES: CPT

## 2025-05-16 PROCEDURE — 2060000000 HC ICU INTERMEDIATE R&B

## 2025-05-16 PROCEDURE — 5A1D70Z PERFORMANCE OF URINARY FILTRATION, INTERMITTENT, LESS THAN 6 HOURS PER DAY: ICD-10-PCS | Performed by: INTERNAL MEDICINE

## 2025-05-16 PROCEDURE — 6360000002 HC RX W HCPCS: Performed by: INTERNAL MEDICINE

## 2025-05-16 PROCEDURE — 80048 BASIC METABOLIC PNL TOTAL CA: CPT

## 2025-05-16 PROCEDURE — 84100 ASSAY OF PHOSPHORUS: CPT

## 2025-05-16 PROCEDURE — 82962 GLUCOSE BLOOD TEST: CPT

## 2025-05-16 PROCEDURE — 6360000002 HC RX W HCPCS: Performed by: STUDENT IN AN ORGANIZED HEALTH CARE EDUCATION/TRAINING PROGRAM

## 2025-05-16 PROCEDURE — 85025 COMPLETE CBC W/AUTO DIFF WBC: CPT

## 2025-05-16 PROCEDURE — 82533 TOTAL CORTISOL: CPT

## 2025-05-16 PROCEDURE — 36415 COLL VENOUS BLD VENIPUNCTURE: CPT

## 2025-05-16 PROCEDURE — 83735 ASSAY OF MAGNESIUM: CPT

## 2025-05-16 RX ADMIN — HEPARIN SODIUM 5000 UNITS: 5000 INJECTION INTRAVENOUS; SUBCUTANEOUS at 05:50

## 2025-05-16 RX ADMIN — FLUOXETINE HYDROCHLORIDE 10 MG: 10 CAPSULE ORAL at 09:23

## 2025-05-16 RX ADMIN — INSULIN LISPRO 2 UNITS: 100 INJECTION, SOLUTION INTRAVENOUS; SUBCUTANEOUS at 16:56

## 2025-05-16 RX ADMIN — SODIUM CHLORIDE, PRESERVATIVE FREE 10 ML: 5 INJECTION INTRAVENOUS at 09:23

## 2025-05-16 RX ADMIN — SUCRALFATE 1 G: 1 TABLET ORAL at 20:13

## 2025-05-16 RX ADMIN — SUCRALFATE 1 G: 1 TABLET ORAL at 16:56

## 2025-05-16 RX ADMIN — HEPARIN SODIUM 5000 UNITS: 5000 INJECTION INTRAVENOUS; SUBCUTANEOUS at 22:18

## 2025-05-16 RX ADMIN — ASPIRIN 81 MG: 81 TABLET, CHEWABLE ORAL at 09:22

## 2025-05-16 RX ADMIN — ATORVASTATIN CALCIUM 40 MG: 40 TABLET, FILM COATED ORAL at 20:13

## 2025-05-16 RX ADMIN — Medication 2000 UNITS: at 09:22

## 2025-05-16 RX ADMIN — SODIUM CHLORIDE, PRESERVATIVE FREE 10 ML: 5 INJECTION INTRAVENOUS at 20:16

## 2025-05-16 RX ADMIN — COSYNTROPIN 250 MCG: 0.25 INJECTION, POWDER, LYOPHILIZED, FOR SOLUTION INTRAMUSCULAR; INTRAVENOUS at 09:23

## 2025-05-16 RX ADMIN — SUCRALFATE 1 G: 1 TABLET ORAL at 09:22

## 2025-05-16 RX ADMIN — SUCRALFATE 1 G: 1 TABLET ORAL at 13:49

## 2025-05-16 NOTE — FLOWSHEET NOTE
Orthostatic Vitals:      5/16/2025    11:06 AM   Orthostatic Vitals   Orthostatic B/P and Pulse? Yes   Blood Pressure Lying 99/56   Pulse Lying 94 PER MINUTE   Blood Pressure Sitting 83/49   Pulse Sitting 94 PER MINUTE

## 2025-05-17 LAB
ANION GAP SERPL CALC-SCNC: 5 MMOL/L (ref 2–12)
BASOPHILS # BLD: 0.02 K/UL (ref 0–0.1)
BASOPHILS NFR BLD: 0.3 % (ref 0–1)
BUN SERPL-MCNC: 73 MG/DL (ref 6–20)
BUN/CREAT SERPL: 7 (ref 12–20)
CALCIUM SERPL-MCNC: 8.6 MG/DL (ref 8.5–10.1)
CHLORIDE SERPL-SCNC: 109 MMOL/L (ref 97–108)
CO2 SERPL-SCNC: 25 MMOL/L (ref 21–32)
CREAT SERPL-MCNC: 9.75 MG/DL (ref 0.7–1.3)
DIFFERENTIAL METHOD BLD: ABNORMAL
EOSINOPHIL # BLD: 0.11 K/UL (ref 0–0.4)
EOSINOPHIL NFR BLD: 1.6 % (ref 0–7)
ERYTHROCYTE [DISTWIDTH] IN BLOOD BY AUTOMATED COUNT: 12.7 % (ref 11.5–14.5)
GLUCOSE BLD STRIP.AUTO-MCNC: 145 MG/DL (ref 65–117)
GLUCOSE BLD STRIP.AUTO-MCNC: 150 MG/DL (ref 65–117)
GLUCOSE BLD STRIP.AUTO-MCNC: 160 MG/DL (ref 65–117)
GLUCOSE SERPL-MCNC: 143 MG/DL (ref 65–100)
HBV SURFACE AG SER QL: <0.1 INDEX
HBV SURFACE AG SER QL: NEGATIVE
HCT VFR BLD AUTO: 29 % (ref 36.6–50.3)
HGB BLD-MCNC: 9.2 G/DL (ref 12.1–17)
IMM GRANULOCYTES # BLD AUTO: 0.02 K/UL (ref 0–0.04)
IMM GRANULOCYTES NFR BLD AUTO: 0.3 % (ref 0–0.5)
LYMPHOCYTES # BLD: 1.69 K/UL (ref 0.8–3.5)
LYMPHOCYTES NFR BLD: 25.1 % (ref 12–49)
MAGNESIUM SERPL-MCNC: 2.4 MG/DL (ref 1.6–2.4)
MCH RBC QN AUTO: 29.4 PG (ref 26–34)
MCHC RBC AUTO-ENTMCNC: 31.7 G/DL (ref 30–36.5)
MCV RBC AUTO: 92.7 FL (ref 80–99)
MONOCYTES # BLD: 0.53 K/UL (ref 0–1)
MONOCYTES NFR BLD: 7.9 % (ref 5–13)
NEUTS SEG # BLD: 4.37 K/UL (ref 1.8–8)
NEUTS SEG NFR BLD: 64.8 % (ref 32–75)
NRBC # BLD: 0 K/UL (ref 0–0.01)
NRBC BLD-RTO: 0 PER 100 WBC
PHOSPHATE SERPL-MCNC: 3.8 MG/DL (ref 2.6–4.7)
PLATELET # BLD AUTO: 226 K/UL (ref 150–400)
PMV BLD AUTO: 9.6 FL (ref 8.9–12.9)
POTASSIUM SERPL-SCNC: 4.8 MMOL/L (ref 3.5–5.1)
RBC # BLD AUTO: 3.13 M/UL (ref 4.1–5.7)
SERVICE CMNT-IMP: ABNORMAL
SODIUM SERPL-SCNC: 139 MMOL/L (ref 136–145)
WBC # BLD AUTO: 6.7 K/UL (ref 4.1–11.1)

## 2025-05-17 PROCEDURE — 2500000003 HC RX 250 WO HCPCS: Performed by: INTERNAL MEDICINE

## 2025-05-17 PROCEDURE — 82962 GLUCOSE BLOOD TEST: CPT

## 2025-05-17 PROCEDURE — 2060000000 HC ICU INTERMEDIATE R&B

## 2025-05-17 PROCEDURE — 85025 COMPLETE CBC W/AUTO DIFF WBC: CPT

## 2025-05-17 PROCEDURE — 36415 COLL VENOUS BLD VENIPUNCTURE: CPT

## 2025-05-17 PROCEDURE — 84100 ASSAY OF PHOSPHORUS: CPT

## 2025-05-17 PROCEDURE — 6370000000 HC RX 637 (ALT 250 FOR IP): Performed by: STUDENT IN AN ORGANIZED HEALTH CARE EDUCATION/TRAINING PROGRAM

## 2025-05-17 PROCEDURE — 6370000000 HC RX 637 (ALT 250 FOR IP): Performed by: INTERNAL MEDICINE

## 2025-05-17 PROCEDURE — 6360000002 HC RX W HCPCS: Performed by: INTERNAL MEDICINE

## 2025-05-17 PROCEDURE — 87340 HEPATITIS B SURFACE AG IA: CPT

## 2025-05-17 PROCEDURE — 6370000000 HC RX 637 (ALT 250 FOR IP)

## 2025-05-17 PROCEDURE — 83735 ASSAY OF MAGNESIUM: CPT

## 2025-05-17 PROCEDURE — 90935 HEMODIALYSIS ONE EVALUATION: CPT

## 2025-05-17 PROCEDURE — 80048 BASIC METABOLIC PNL TOTAL CA: CPT

## 2025-05-17 RX ORDER — MIDODRINE HYDROCHLORIDE 5 MG/1
5 TABLET ORAL
Status: DISCONTINUED | OUTPATIENT
Start: 2025-05-17 | End: 2025-05-17

## 2025-05-17 RX ORDER — FLUDROCORTISONE ACETATE 0.1 MG/1
0.1 TABLET ORAL DAILY
Status: DISCONTINUED | OUTPATIENT
Start: 2025-05-17 | End: 2025-05-18 | Stop reason: HOSPADM

## 2025-05-17 RX ORDER — FLUDROCORTISONE ACETATE 0.1 MG/1
0.05 TABLET ORAL DAILY
Status: DISCONTINUED | OUTPATIENT
Start: 2025-05-17 | End: 2025-05-17

## 2025-05-17 RX ORDER — MIDODRINE HYDROCHLORIDE 5 MG/1
5 TABLET ORAL
Status: DISCONTINUED | OUTPATIENT
Start: 2025-05-19 | End: 2025-05-18 | Stop reason: HOSPADM

## 2025-05-17 RX ORDER — FLUDROCORTISONE ACETATE 0.1 MG/1
0.1 TABLET ORAL DAILY
Status: CANCELLED | OUTPATIENT
Start: 2025-05-17

## 2025-05-17 RX ORDER — CALCIUM CARBONATE 500 MG/1
500 TABLET, CHEWABLE ORAL ONCE
Status: COMPLETED | OUTPATIENT
Start: 2025-05-17 | End: 2025-05-17

## 2025-05-17 RX ADMIN — ATORVASTATIN CALCIUM 40 MG: 40 TABLET, FILM COATED ORAL at 22:01

## 2025-05-17 RX ADMIN — FLUDROCORTISONE ACETATE 0.1 MG: 0.1 TABLET ORAL at 22:06

## 2025-05-17 RX ADMIN — CALCIUM CARBONATE (ANTACID) CHEW TAB 500 MG 500 MG: 500 CHEW TAB at 22:01

## 2025-05-17 RX ADMIN — HEPARIN SODIUM 5000 UNITS: 5000 INJECTION INTRAVENOUS; SUBCUTANEOUS at 13:23

## 2025-05-17 RX ADMIN — HEPARIN SODIUM 5000 UNITS: 5000 INJECTION INTRAVENOUS; SUBCUTANEOUS at 04:56

## 2025-05-17 RX ADMIN — SUCRALFATE 1 G: 1 TABLET ORAL at 22:01

## 2025-05-17 RX ADMIN — SODIUM CHLORIDE, PRESERVATIVE FREE 10 ML: 5 INJECTION INTRAVENOUS at 22:01

## 2025-05-17 NOTE — FLOWSHEET NOTE
Primary RN SBAR: VALERIA Theodore  Incapacitated Nurse [facility]. provided: yes  Patient Education provided: Infection Prevention  Preferred Education method and Primary language: Verbal,English  Dialysis consent: Chronic applies  Hospital General Consent Verified: Yes  Hospital associated wait time; reason: Transport  Hepatitis B Surface Ag   Date/Time Value Ref Range Status   06/28/2024 04:47 AM <0.10 Index Final     Hep B S Ag Interp   Date/Time Value Ref Range Status   06/28/2024 04:47 AM Negative NEG   Final     Hep B S Ab   Date/Time Value Ref Range Status   05/16/2025 10:40 AM 7.75 mIU/mL Final     Hep B S Ab Interp   Date/Time Value Ref Range Status   05/16/2025 10:40 AM NONREACTIVE NR   Final     Comment:     (NOTE)  The [employer] Anti-HBs2 assay is traceable to the World Health   Organization (WHO) Hepatitis B Immunoglobulin 1st International   Reference Preparation (1977). Samples with a calculated value of 10   mIU/mL or greater are considered reactive (protective) in accordance   with the CDC guidelines. The accepted criteria for immunity to HBV is   anti-HBs activity greater than or equal to 10 mIU/mL, as defined by   the WHO International Reference Preparation.  Assay performance has not been established in pregnant women,   patients who are immunosuppressed or immunocompromised, nor have   performance characteristics been established in conjunction with   other 's assays for specific HBV serologic markers. This   assay does not differentiate between vaccine induced immune response   and a response due to infection with HBV. Passively acquired anti-HBs   may be identified following patient transfusion, receipt of   immunoglobulin products, etc.          05/17/25 0900   Observations & Evaluations   Level of Consciousness 0   Oriented X 4   Heart Rhythm Regular   Respiratory Quality/Effort Unlabored   O2 Device None (Room air)   Bilateral Breath Sounds Clear   Edema None   Vital Signs   BP (!)

## 2025-05-18 VITALS
TEMPERATURE: 98 F | BODY MASS INDEX: 20.02 KG/M2 | OXYGEN SATURATION: 97 % | DIASTOLIC BLOOD PRESSURE: 81 MMHG | HEART RATE: 84 BPM | SYSTOLIC BLOOD PRESSURE: 159 MMHG | HEIGHT: 74 IN | WEIGHT: 156 LBS | RESPIRATION RATE: 16 BRPM

## 2025-05-18 LAB
ANION GAP SERPL CALC-SCNC: 6 MMOL/L (ref 2–12)
BASOPHILS # BLD: 0.03 K/UL (ref 0–0.1)
BASOPHILS NFR BLD: 0.5 % (ref 0–1)
BUN SERPL-MCNC: 36 MG/DL (ref 6–20)
BUN/CREAT SERPL: 6 (ref 12–20)
CALCIUM SERPL-MCNC: 8.9 MG/DL (ref 8.5–10.1)
CHLORIDE SERPL-SCNC: 103 MMOL/L (ref 97–108)
CO2 SERPL-SCNC: 28 MMOL/L (ref 21–32)
CREAT SERPL-MCNC: 5.93 MG/DL (ref 0.7–1.3)
DIFFERENTIAL METHOD BLD: ABNORMAL
EOSINOPHIL # BLD: 0.07 K/UL (ref 0–0.4)
EOSINOPHIL NFR BLD: 1.2 % (ref 0–7)
ERYTHROCYTE [DISTWIDTH] IN BLOOD BY AUTOMATED COUNT: 12.7 % (ref 11.5–14.5)
GLUCOSE SERPL-MCNC: 124 MG/DL (ref 65–100)
HCT VFR BLD AUTO: 28.7 % (ref 36.6–50.3)
HGB BLD-MCNC: 9.2 G/DL (ref 12.1–17)
IMM GRANULOCYTES # BLD AUTO: 0.01 K/UL (ref 0–0.04)
IMM GRANULOCYTES NFR BLD AUTO: 0.2 % (ref 0–0.5)
LYMPHOCYTES # BLD: 1.46 K/UL (ref 0.8–3.5)
LYMPHOCYTES NFR BLD: 24.3 % (ref 12–49)
MAGNESIUM SERPL-MCNC: 2 MG/DL (ref 1.6–2.4)
MCH RBC QN AUTO: 29.8 PG (ref 26–34)
MCHC RBC AUTO-ENTMCNC: 32.1 G/DL (ref 30–36.5)
MCV RBC AUTO: 92.9 FL (ref 80–99)
MONOCYTES # BLD: 0.51 K/UL (ref 0–1)
MONOCYTES NFR BLD: 8.5 % (ref 5–13)
NEUTS SEG # BLD: 3.93 K/UL (ref 1.8–8)
NEUTS SEG NFR BLD: 65.3 % (ref 32–75)
NRBC # BLD: 0 K/UL (ref 0–0.01)
NRBC BLD-RTO: 0 PER 100 WBC
PHOSPHATE SERPL-MCNC: 4 MG/DL (ref 2.6–4.7)
PLATELET # BLD AUTO: 229 K/UL (ref 150–400)
PMV BLD AUTO: 9.6 FL (ref 8.9–12.9)
POTASSIUM SERPL-SCNC: 3.8 MMOL/L (ref 3.5–5.1)
RBC # BLD AUTO: 3.09 M/UL (ref 4.1–5.7)
SODIUM SERPL-SCNC: 137 MMOL/L (ref 136–145)
WBC # BLD AUTO: 6 K/UL (ref 4.1–11.1)

## 2025-05-18 PROCEDURE — 6370000000 HC RX 637 (ALT 250 FOR IP): Performed by: STUDENT IN AN ORGANIZED HEALTH CARE EDUCATION/TRAINING PROGRAM

## 2025-05-18 PROCEDURE — 84100 ASSAY OF PHOSPHORUS: CPT

## 2025-05-18 PROCEDURE — 83735 ASSAY OF MAGNESIUM: CPT

## 2025-05-18 PROCEDURE — 6370000000 HC RX 637 (ALT 250 FOR IP): Performed by: INTERNAL MEDICINE

## 2025-05-18 PROCEDURE — 36415 COLL VENOUS BLD VENIPUNCTURE: CPT

## 2025-05-18 PROCEDURE — 85025 COMPLETE CBC W/AUTO DIFF WBC: CPT

## 2025-05-18 PROCEDURE — 80048 BASIC METABOLIC PNL TOTAL CA: CPT

## 2025-05-18 RX ORDER — MIDODRINE HYDROCHLORIDE 5 MG/1
5 TABLET ORAL
Qty: 90 TABLET | Refills: 3 | Status: SHIPPED | OUTPATIENT
Start: 2025-05-19

## 2025-05-18 RX ADMIN — ASPIRIN 81 MG: 81 TABLET, CHEWABLE ORAL at 08:54

## 2025-05-18 RX ADMIN — Medication 2000 UNITS: at 08:54

## 2025-05-18 NOTE — PROGRESS NOTES
NAME: Gerald Yuen Jr.        :  1965        MRN:  [id number]         IMPRESSION & PLAN:   ESRD - Monday/Friday - [facility]- [place]  [place]  Syncope  Hypertension  Anemia  Sec. hyperpara        PLAN-  No hd indicated today  H/H at goal.  Hold SALEEM.  Continue norvasc,            Subjective:     Chief Complaint:  \" I feel fine.\"      Review of Systems:    Symptom Y/N Comments  Symptom Y/N Comments   Fever/Chills    Chest Pain     Poor Appetite    Edema     Cough    Abdominal Pain     Sputum    Joint Pain     SOB/PEÑA    Pruritis/Rash     Nausea/vomit    Tolerating PT/OT     Diarrhea    Tolerating Diet     Constipation    Other       Could not obtain due to:      Objective:     VITALS:   Last 24hrs VS reviewed since prior progress note. Most recent are:  Vitals:    25 0820   BP: (!) 143/92   Pulse:    Resp:    Temp:    SpO2:        Intake/Output Summary (Last 24 hours) at 2025 0958  Last data filed at 2025 0639  Gross per 24 hour   Intake 120 ml   Output 300 ml   Net -180 ml      Telemetry Reviewed:     PHYSICAL EXAM:  General: NAD  No edema      Lab Data Reviewed: (see below)    Medications Reviewed: (see below)    PMH/SH reviewed - no change compared to H&P  ________________________________________________________________________  Care Plan discussed with:  Patient     Family      RN     Care Manager                    Consultant:          Comments   >50% of visit spent in counseling and coordination of care       ________________________________________________________________________  Memo Gaspar MD     Procedures: see electronic medical records for all procedures/Xrays and details which  were not copied into this note but were reviewed prior to creation of Plan.      LABS:  Recent Labs     25  1735 25  0637   WBC 6.9 6.8   HGB 10.8* 10.2*   HCT 33.5* 31.0*    226     Recent Labs     
                                                                              NAME: Gerald Yuen Jr.        :  1965        MRN:  [id number]         IMPRESSION & PLAN:   ESRD - Monday/Friday - [facility]- [place]  [place]  Syncope  Hypertension  Anemia  Sec. hyperpara        PLAN-  Hemodialysis bumped from yesterday to today due to schedule constraints.  Discussed with Fatuma-plan to dialyze him  Fluid removal as tolerated  Hemoglobin 9.2-will give SALEEM 10K weekly  Discussed with patient         Subjective:     Seen and examined  No new complaints to me    Objective:     VITALS:   Last 24hrs VS reviewed since prior progress note. Most recent are:  Vitals:    25 0800   BP:    Pulse: 88   Resp:    Temp:    SpO2:        Intake/Output Summary (Last 24 hours) at 2025 0908  Last data filed at 2025 0239  Gross per 24 hour   Intake 850 ml   Output --   Net 850 ml      Telemetry Reviewed:     PHYSICAL EXAM:  General: NAD  No edema  Not in respiratory distress  Alert oriented x 3  No asterixis  Nonlabored respiration    Lab Data Reviewed: (see below)    Medications Reviewed: (see below)    PMH/SH reviewed - no change compared to H&P  ________________________________________________________________________  Care Plan discussed with:  Patient x    Family      RN x    Care Manager                    Consultant:          Comments   >50% of visit spent in counseling and coordination of care       ________________________________________________________________________  Mary Lenz MD     Procedures: see electronic medical records for all procedures/Xrays and details which  were not copied into this note but were reviewed prior to creation of Plan.      LABS:  Recent Labs     25  0544 25  0238   WBC 6.4 6.7   HGB 9.1* 9.2*   HCT 28.3* 29.0*    226     Recent Labs     05/15/25  0641 25  0544 25  0238    137 139   K 4.3 4.2 4.8    104 109*   CO2 23 24 25 
                                                                              NAME: Gerald Yuen Jr.        :  1965        MRN:  [id number]         IMPRESSION & PLAN:   ESRD - Monday/Friday - [facility]- [place]  [place]  Syncope  Hypertension  Anemia  Sec. hyperpara        PLAN-  Plan for HD today.  H/H about at  goal.  Hold SALEEM.  Continue norvasc,            Subjective:     Chief Complaint:  \" I feel better.\"      Review of Systems:    Symptom Y/N Comments  Symptom Y/N Comments   Fever/Chills    Chest Pain     Poor Appetite    Edema     Cough    Abdominal Pain     Sputum    Joint Pain     SOB/PEÑA    Pruritis/Rash     Nausea/vomit    Tolerating PT/OT     Diarrhea    Tolerating Diet     Constipation    Other       Could not obtain due to:      Objective:     VITALS:   Last 24hrs VS reviewed since prior progress note. Most recent are:  Vitals:    25 0815   BP: 130/68   Pulse: 88   Resp:    Temp: 98 °F (36.7 °C)   SpO2:        Intake/Output Summary (Last 24 hours) at 2025 1027  Last data filed at 5/15/2025 2157  Gross per 24 hour   Intake 650 ml   Output 575 ml   Net 75 ml      Telemetry Reviewed:     PHYSICAL EXAM:  General: NAD  No edema      Lab Data Reviewed: (see below)    Medications Reviewed: (see below)    PMH/SH reviewed - no change compared to H&P  ________________________________________________________________________  Care Plan discussed with:  Patient     Family      RN     Care Manager                    Consultant:          Comments   >50% of visit spent in counseling and coordination of care       ________________________________________________________________________  Memo Gaspar MD     Procedures: see electronic medical records for all procedures/Xrays and details which  were not copied into this note but were reviewed prior to creation of Plan.      LABS:  Recent Labs     05/15/25  0641 25  0544   WBC 5.9 6.4   HGB 9.7* 9.1*   HCT 30.3* 28.3*    219 
                                                                              NAME: Gerald Yuen Jr.        :  1965        MRN:  [id number]         IMPRESSION & PLAN:   ESRD - Monday/Friday - [facility]- [place]  [place]  Syncope  Hypertension  Anemia  Sec. hyperpara        PLAN-  Hemodialysis was done yesterday for 210 minutes with net fluid removal of 700 mL.  Blood pressure, dropped last 45 minutes of treatment and fluid removal was held.  I discussed with Dr. Mendel earlier and we agreed that midodrine dose could be increased and also he could reinstate Florinef.  Note that he did not test positive for adrenal insufficiency.  Will start Florinef at dose of 0.1 daily.  If he develops worsening edema and fluid retention we may have to decrease the dose or discontinue it.  Of course doing dialysis twice a week is not sufficient and leaves him prone for inadequate clearance and uremia which worsens the symptoms.  Continue with SALEEM f 10K weekly for now.  Wanted to be discharged.  Cleared from renal standpoint           Subjective:     Seen and examined  No complaints to me.  Had dialysis yesterday and states that he is ready to go home today  Objective:     VITALS:   Last 24hrs VS reviewed since prior progress note. Most recent are:  Vitals:    25 0856   BP: (!) 159/81   Pulse: 84   Resp: 16   Temp: 98 °F (36.7 °C)   SpO2: 97%       Intake/Output Summary (Last 24 hours) at 2025 0915  Last data filed at 2025 1242  Gross per 24 hour   Intake 500 ml   Output 1200 ml   Net -700 ml      Telemetry Reviewed:     PHYSICAL EXAM:  General: NAD  No edema  Not in respiratory distress  Alert oriented x 3  No asterixis  Nonlabored respiration    Lab Data Reviewed: (see below)    Medications Reviewed: (see below)    PMH/SH reviewed - no change compared to H&P  ________________________________________________________________________  Care Plan discussed with:  Patient x    Family      RN x    Care Manager 
      Hospitalist Progress Note    NAME:   Gerald Yuen Jr.   : 1965   MRN: [id number]     Date/Time: 5/15/2025 5:48 PM  Patient PCP: Marcy Padilla MD    Estimated discharge date:   Barriers: echo, blood pressure stability (hypertensive but severe orthostasis)      Assessment / Plan:    Syncope  -CT head shows no acute process.  Telemetry monitoring.  -Does have a history of orthostatic hypotension and is on midodrine at home but on arrival blood pressure was elevated at more than 200 systolic but currently blood pressure is improved.  - resumed midodrine  - orthostatics positive. Is on M/F HD, will discuss with Nephrology  LE duplex negative  - echo remains pending  - morning cortisol 12, is midrange. Checking cortisol stim test in the morning     ESRD on HD  -Consult renal for hemodialysis needs, is on M/F HD.   - gave back IVF today, discussed with Nephro     Diabetes mellitus type 2  - Start insulin sliding scale with blood sugar checks.  He is on insulin 75/25 only as needed.  Will resume his home dose based on his blood sugars     Depression  Hypertension  Dyslipidemia  History of CVA  History of left femur fracture s/p surgery recently  - Continue PTA Prozac  - holding amlodipine  - Continue PTA statin  - Continue PTA aspirin        Medical Decision Making:   I personally reviewed labs: CBC, BMP, cortisol level  I personally reviewed imaging:   I personally reviewed EKG:  Toxic drug monitoring: Monitor blood sugar for hypoglycemia from insulin toxicity  Discussed case with: RN CM     Code Status: Full code  DVT Prophylaxis: Heparin    Subjective:     Chief Complaint / Reason for Physician Visit  \"Followup syncope\".  Discussed with RN events overnight. Remains significantly orthostatic with vitals check.      Objective:     VITALS:   Last 24hrs VS reviewed since prior progress note. Most recent are:  Patient Vitals for the past 24 hrs:   BP Temp Temp src Pulse Resp SpO2 Height Weight 
      Hospitalist Progress Note    NAME:   Gerald Yuen Jr.   : 1965   MRN: [id number]     Date/Time: 2025 6:28 PM  Patient PCP: Marcy Padilla MD    Estimated discharge date: 5/15  Barriers: echo, blood pressure stability (hypertensive but severe orthostasis)      Assessment / Plan:    Syncope  -CT head shows no acute process.  Telemetry monitoring.  -Does have a history of orthostatic hypotension and is on midodrine at home but on arrival blood pressure was elevated at more than 200 systolic but currently blood pressure is improved.  - holding midodrine as is hypertensive at baseline  - orthostatics positive. Is on M/F HD, will discuss with Nephrology  LE duplex negative  - echo pending  - checking morning cortisol      ESRD on HD  -Consult renal for hemodialysis needs, is on M/F HD     Diabetes mellitus type 2  - Start insulin sliding scale with blood sugar checks.  He is on insulin 75/25 only as needed.  Will resume his home dose based on his blood sugars     Depression  Hypertension  Dyslipidemia  History of CVA  History of left femur fracture s/p surgery recently  - Continue PTA Prozac  - Currently blood pressure is around 116 x 84.  Will resume PTA Norvasc.  - Continue PTA statin  - Continue PTA aspirin        Medical Decision Making:   I personally reviewed labs: CBC, BMP  I personally reviewed imaging: echo  I personally reviewed EKG:  Toxic drug monitoring: Monitor blood sugar for hypoglycemia from insulin toxicity  Discussed case with: RN CM     Code Status: Full code  DVT Prophylaxis: Heparin    Subjective:     Chief Complaint / Reason for Physician Visit  \"Followup syncope\".  Discussed with RN events overnight. Feels ok today, no complaints. Orthostatic with PT.      Objective:     VITALS:   Last 24hrs VS reviewed since prior progress note. Most recent are:  Patient Vitals for the past 24 hrs:   BP Temp Temp src Pulse Resp SpO2 Weight   25 1433 (!) 143/78 97.5 °F (36.4 °C) 
      Hospitalist Progress Note    NAME:   Gerald Yuen Jr.   : 1965   MRN: [id number]     Date/Time: 2025 5:23 PM  Patient PCP: Marcy Padilla MD    Estimated discharge date:   Barriers: echo read, blood pressure stability (hypertensive but severe orthostasis)      Assessment / Plan:    Syncope  -CT head shows no acute process.  Telemetry monitoring.  -Does have a history of orthostatic hypotension and is on midodrine at home but on arrival blood pressure was elevated at more than 200 systolic but currently blood pressure is improved.  - resumed midodrine  - orthostatics positive. Is on M/F HD, will discuss with Nephrology  LE duplex negative  - echo read remains pending  - morning cortisol 12, is midrange. checked cortisol stim test, appropriate response, is not adrenally insufficient  - remains severely and symptomatically orthostatic with check today  - discussed with Nephrology, holding norvasc. Will adjust HD settings today     ESRD on HD  -Consult renal for hemodialysis needs, is on M/F HD.   - gave back IVF 5/15, discussed with Nephro     Diabetes mellitus type 2  - Start insulin sliding scale with blood sugar checks.  He is on insulin 75/25 only as needed.  Will resume his home dose based on his blood sugars     Depression  Hypertension  Dyslipidemia  History of CVA  History of left femur fracture s/p surgery recently  - Continue PTA Prozac  - holding amlodipine  - Continue PTA statin  - Continue PTA aspirin        Medical Decision Making:   I personally reviewed labs: CBC, BMP, cortisol levels with stim test  I personally reviewed imaging:   I personally reviewed EKG:  Toxic drug monitoring: Monitor blood sugar for hypoglycemia from insulin toxicity  Discussed case with: RN CM, Nephrology     Code Status: Full code  DVT Prophylaxis: Heparin    Subjective:     Chief Complaint / Reason for Physician Visit  \"Followup syncope\".  Discussed with RN events overnight. Still getting dizzy 
   05/14/25 1039 05/14/25 1044 05/14/25 1048   Vital Signs   Pulse 85 87 84   Heart Rate Source Monitor Monitor Monitor   /62 (!) 63/40 117/68   MAP (Calculated) 79 48 84   BP Location Left upper arm Left upper arm Left upper arm   BP Method Automatic Automatic Automatic   Patient Position Supine Sitting Trendelenburg  (slight)      05/14/25 1051   Vital Signs   Pulse 84   Heart Rate Source Monitor   BP (!) 129/59   MAP (Calculated) 82   BP Location Left upper arm   BP Method Automatic   Patient Position Semi fowlers       
  Physician Progress Note      PATIENT:               SUMAN PRITCHARD  CSN #:                  [id number]  :                       1965  ADMIT DATE:       2025 6:58 PM  DISCH DATE:  RESPONDING  PROVIDER #:        David L Mendel MD          QUERY TEXT:    Orthostatic hypotension per Dr. David Mendel. Please clarify the etiology of   orthostatic hypotension.    The clinical indicators include:  > Jimmy Henriquez MD, 2025- \"CT head shows no acute process. Telemetry   monitoring.  Does have a history of orthostatic hypotension and is on midodrine at home but   on arrival blood pressure was elevated at more than 200 systolic but   currently blood pressure is improved.  holding midodrine as is hypertensive at baseline\"  > Progress Notes by Mendel, David L, MD 5/15/2025-\"Likely related to   orthostatic hypotension\"  Options provided:  -- Orthostatic hypotension related to, Please document cause.  -- Orthostatic hypotension related to unknown cause  -- Other - I will add my own diagnosis  -- Disagree - Not applicable / Not valid  -- Disagree - Clinically unable to determine / Unknown  -- Refer to Clinical Documentation Reviewer    PROVIDER RESPONSE TEXT:    This patient has orthostatic hypotension related to unknown cause.    Query created by: July Moya on 2025 8:05 AM      Electronically signed by:  David L Mendel MD 2025 8:19 AM          
  Physician Progress Note      PATIENT:               SUMAN PRITCHARD  CSN #:                  [id number]  :                       1965  ADMIT DATE:       2025 6:58 PM  DISCH DATE:  RESPONDING  PROVIDER #:        David L Mendel MD          QUERY TEXT:    Syncope is documented in the medical record Mendel, David L, MD, 2025.    Please clarify the cause such as:    The clinical indicators include:  > Mendel, David L, MD, 2025-  \"Syncope  CT head shows no acute process.  Telemetry monitoring.  Does have a history of orthostatic hypotension and is on midodrine at home but   on arrival blood pressure was elevated at more than 200 systolic but   currently blood pressure is improved.  holding midodrine as is hypertensive at baseline  orthostatics positive. Is on M/F HD, will discuss with Nephrology  LE duplex negative  echo pending  checking morning cortisol\"  Options provided:  -- Syncope related to TIA  -- Syncope related to Stroke  -- Syncope related to Carotid Stenosis  -- Syncope related to orthostatic hypotension secondary to *** (diabetic,   Parkinson's or MS) autonomic neuropathy, Please document cause.  -- Syncope related to orthostatic hypotension secondary to *** (dehydration,   SHAHRAM, hypoglycemia), Please document cause.  -- Syncope related to other hypotension  -- Carotid sinus syncope  -- Other - I will add my own diagnosis  -- Disagree - Not applicable / Not valid  -- Disagree - Clinically unable to determine / Unknown  -- Refer to Clinical Documentation Reviewer    PROVIDER RESPONSE TEXT:    Likely related to orthostatic hypotension    Query created by: July Moya on 5/15/2025 10:30 AM      Electronically signed by:  David L Mendel MD 5/15/2025 2:53 PM          
0900 - pt wants to be discharged asap.  Pt states he feels we are not doing anything for him.  Pt refused florinef, carafate, and prozac this morning.  Let Dr. Mendel know in which case he ordered orthostatic bp's for him.  Approached pt to do orthostatics but pt refused stating: \"f*ck that its time to go\". Let provider know again.    1200 - pt leaving AMA.  AMA forms signed.  Pt mother is here for his ride home.  Upon leaving pt wanted to go to bathroom in which case he was still very dizzy, lightheaded, and very unbalanced.  Pt still adamant of leaving.  Pt educated on discharge instructions with mother at the bedside. Dr. Mendel made  aware.    
End of Shift Note    Bedside shift change report given to Coreen SHAW  (oncoming nurse) by Rebecca Cantu RN (offgoing nurse).  Report included the following information SBAR and MAR    Shift worked:  Days      Shift summary and any significant changes:     1200 Worked with PT orthostatics done failed   1523 Echo done   1815 [place] cejamolly applied      Concerns for physician to address:       Zone phone for oncoming shift:          Activity:  Level of Assistance: Moderate assist, patient does 50-74%  Number times ambulated in hallways past shift: 0  Number of times OOB to chair past shift: 1    Cardiac:   Cardiac Monitoring: Yes      Cardiac Rhythm: Sinus rhythm    Access:  Current line(s): PIV     Genitourinary:   Urinary Status: Oliguria    Respiratory:   O2 Device: None (Room air)  Chronic home O2 use?: NO  Incentive spirometer at bedside: N/A    GI:  Last BM (including prior to admit): 05/11/25  Current diet:  ADULT DIET; Regular  Passing flatus: YES    Pain Management:   Patient states pain is manageable on current regimen: N/A    Skin:  Sanket Scale Score: 19  Interventions: Wound Offloading (Prevention Methods): Bed, pressure redistribution/air, Repositioning, Pillows, Turning    Patient Safety:  Fall Risk: Nursing Judgement-Fall Risk High(Add Comments): Yes  Fall Risk Interventions  Nursing Judgement-Fall Risk High(Add Comments): Yes  Toilet Every 2 Hours-In Advance of Need: Yes  Hourly Visual Checks: In bed  Fall Visual Posted: Armband, Fall sign posted, Socks  Room Door Open: Deferred to decrease stimulation  Alarm On: Bed, Chair  Patient Moved Closer to Nursing Station: No    Active Consults:   IP CONSULT TO NEPHROLOGY    Length of Stay:  Expected LOS: 3  Actual LOS: 2    Rebecca Cantu RN                          
End of Shift Note    Bedside shift change report given to Coreen SHAW  (oncoming nurse) by Rebecca Cantu RN (offgoing nurse).  Report included the following information SBAR and MAR    Shift worked:  Days      Shift summary and any significant changes:     Worked with PT and OT did not pass orthostatic testing      Concerns for physician to address:       Zone phone for oncoming shift:          Activity:  Level of Assistance: Moderate assist, patient does 50-74%  Number times ambulated in hallways past shift: 0  Number of times OOB to chair past shift: 0    Cardiac:   Cardiac Monitoring: Yes      Cardiac Rhythm: Sinus rhythm    Access:  Current line(s): PIV     Genitourinary:   Urinary Status: Has not voided, Oliguria    Respiratory:   O2 Device: None (Room air)  Chronic home O2 use?: NO  Incentive spirometer at bedside: N/A    GI:  Last BM (including prior to admit): 05/11/25  Current diet:  ADULT DIET; Regular  Passing flatus: YES    Pain Management:   Patient states pain is manageable on current regimen: N/A    Skin:  Sanket Scale Score: 19  Interventions: Wound Offloading (Prevention Methods): Blankets, Elevate heels, Repositioning, Pillows, Turning    Patient Safety:  Fall Risk: Nursing Judgement-Fall Risk High(Add Comments): Yes  Fall Risk Interventions  Nursing Judgement-Fall Risk High(Add Comments): Yes  Toilet Every 2 Hours-In Advance of Need: Yes  Hourly Visual Checks: In bed  Fall Visual Posted: Armband, Fall sign posted, Mat, Socks  Room Door Open: Deferred to promote rest  Alarm On: Chair, Bed  Patient Moved Closer to Nursing Station: No    Active Consults:   IP CONSULT TO NEPHROLOGY    Length of Stay:  Expected LOS: 3  Actual LOS: 1    Rebecca Cantu RN                          
End of Shift Note    Bedside shift change report given to Luz SHAW (oncoming nurse) by Rick Alves RN (offgoing nurse).  Report included the following information SBAR and MAR    Shift worked:  7a-7p     Shift summary and any significant changes:     No events during shift.     Concerns for physician to address:  N/A     Zone phone for oncoming shift:   N/A       Activity:  Level of Assistance: Moderate assist, patient does 50-74%  Number times ambulated in hallways past shift: 0  Number of times OOB to chair past shift: 0    Cardiac:   Cardiac Monitoring: Yes      Cardiac Rhythm: Sinus rhythm    Access:  Current line(s): PIV     Genitourinary:   Urinary Status: Oliguria    Respiratory:   O2 Device: None (Room air)  Chronic home O2 use?: NO  Incentive spirometer at bedside: NO    GI:  Last BM (including prior to admit): 05/17/25  Current diet:  ADULT DIET; Regular  Passing flatus: YES    Pain Management:   Patient states pain is manageable on current regimen: YES    Skin:  Sanket Scale Score: 18  Interventions: Wound Offloading (Prevention Methods): Bed, pressure redistribution/air, Pillows, Repositioning, Turning    Patient Safety:  Fall Risk: Nursing Judgement-Fall Risk High(Add Comments): Yes  Fall Risk Interventions  Nursing Judgement-Fall Risk High(Add Comments): Yes  Toilet Every 2 Hours-In Advance of Need: Yes  Hourly Visual Checks: Quiet, Awake, In bed  Fall Visual Posted: Armband, Socks, Fall sign posted  Room Door Open: Deferred to decrease stimulation  Alarm On: Bed  Patient Moved Closer to Nursing Station: No    Active Consults:   IP CONSULT TO NEPHROLOGY    Length of Stay:  Expected LOS: 6  Actual LOS: 4    Rick Alves RN                           
End of Shift Note    Bedside shift change report given to Rebecca SHAW (oncoming nurse) by Viola Parmar RN (offgoing nurse).  Report included the following information SBAR    Shift worked:  7p-7a     Shift summary and any significant changes:     BLE doppler completed last night. Pt denied being dizzy and lightheaded during shift. Pt had no other complaints overnight. PT/OT expected to work with pt pending results of doppler. HD port dressing changed.      Concerns for physician to address:       Zone phone for oncoming shift:          Activity:  Level of Assistance: Moderate assist, patient does 50-74%  Number times ambulated in hallways past shift: 0  Number of times OOB to chair past shift: 0    Cardiac:   Cardiac Monitoring: Yes      Cardiac Rhythm: Sinus rhythm    Access:  Current line(s): PIV, Hemodialysis central access right chest     Genitourinary:   Urinary Status: Has not voided, Oliguria    Respiratory:   O2 Device: None (Room air)  Chronic home O2 use?: NO  Incentive spirometer at bedside: NO    GI:  Last BM (including prior to admit): 05/11/25  Current diet:  ADULT DIET; Regular  Passing flatus: YES    Pain Management:   Patient states pain is manageable on current regimen: YES    Skin:  Sanket Scale Score: 19  Interventions: Wound Offloading (Prevention Methods): Blankets, Elevate heels, Repositioning, Pillows, Turning    Patient Safety:  Fall Risk: Nursing Judgement-Fall Risk High(Add Comments): Yes  Fall Risk Interventions  Nursing Judgement-Fall Risk High(Add Comments): Yes  Toilet Every 2 Hours-In Advance of Need: Yes  Hourly Visual Checks: In bed  Fall Visual Posted: Armband, Fall sign posted, Mat, Socks  Room Door Open: Deferred to promote rest  Alarm On: Chair, Bed  Patient Moved Closer to Nursing Station: No    Active Consults:   IP CONSULT TO NEPHROLOGY    Length of Stay:  Expected LOS: 3  Actual LOS: 1    Viola Parmar, RN      
End of Shift Note    Bedside shift change report given to Rebecca Spears RN (oncoming nurse) by Coreen Bravo RN (offgoing nurse).  Report included the following information SBAR    Shift worked:  6284-4951     Shift summary and any significant changes:     Uneventful night.     Concerns for physician to address:       Zone phone for oncoming shift:          Coreen Bravo, RN                           
End of Shift Note    Bedside shift change report given to Rick (oncoming nurse) by JAMES RAMIREZ RN (offgoing nurse).  Report included the following information SBAR, Kardex, MAR, and Recent Results    Shift worked:  7p-7a     Shift summary and any significant changes:     Uneventful shift     Concerns for physician to address:       Zone phone for oncoming shift:          Activity:  Level of Assistance: Moderate assist, patient does 50-74%  Number times ambulated in hallways past shift: 0  Number of times OOB to chair past shift: 0    Cardiac:   Cardiac Monitoring: Yes      Cardiac Rhythm: Sinus rhythm    Access:  Current line(s): PIV     Genitourinary:   Urinary Status: Oliguria    Respiratory:   O2 Device: None (Room air)  Chronic home O2 use?: NO  Incentive spirometer at bedside: N/A    GI:  Last BM (including prior to admit): 05/11/25  Current diet:  ADULT DIET; Regular  Passing flatus: YES    Pain Management:   Patient states pain is manageable on current regimen: YES    Skin:  Sanket Scale Score: 18  Interventions: Wound Offloading (Prevention Methods): Pillows, Repositioning, Elevate heels    Patient Safety:  Fall Risk: Nursing Judgement-Fall Risk High(Add Comments): Yes  Fall Risk Interventions  Nursing Judgement-Fall Risk High(Add Comments): Yes  Toilet Every 2 Hours-In Advance of Need: Yes  Hourly Visual Checks: In bed, Eyes closed  Fall Visual Posted: Armband, Fall sign posted, Socks  Room Door Open: Deferred to decrease stimulation  Alarm On: Bed, Chair  Patient Moved Closer to Nursing Station: No    Active Consults:   IP CONSULT TO NEPHROLOGY    Length of Stay:  Expected LOS: 5  Actual LOS: 4    JAMES RAMIREZ RN                           
End of Shift Note    Bedside shift change report given to Shadi SHAW (oncoming nurse) by Luz Angela RN (offgoing nurse).  Report included the following information SBAR and Kardex    Shift worked:  0095-9149     Shift summary and any significant changes:    Uneventful shift.     Concerns for physician to address: None    Zone phone for oncoming shift:       Activity:  Level of Assistance: Standby assist, set-up cues, supervision of patient - no hands on  Number times ambulated in hallways past shift: 0  Number of times OOB to chair past shift: 0    Cardiac:   Cardiac Monitoring: Yes      Cardiac Rhythm: Sinus rhythm    Access:  Current line(s): PIV     Genitourinary:   Urinary Status: Oliguria    Respiratory:   O2 Device: None (Room air)  Chronic home O2 use?: N/A  Incentive spirometer at bedside: N/A    GI:  Last BM (including prior to admit): 05/17/25  Current diet:  ADULT DIET; Regular  Passing flatus: YES    Pain Management:   Patient states pain is manageable on current regimen: YES    Skin:  Sanket Scale Score: 20  Interventions: Wound Offloading (Prevention Methods): Bed, pressure reduction mattress    Patient Safety:  Fall Risk: Nursing Judgement-Fall Risk High(Add Comments): Yes  Fall Risk Interventions  Nursing Judgement-Fall Risk High(Add Comments): Yes  Toilet Every 2 Hours-In Advance of Need: Yes  Hourly Visual Checks: Eyes closed, In bed  Fall Visual Posted: Fall sign posted  Room Door Open: Deferred to decrease stimulation  Alarm On: Bed  Patient Moved Closer to Nursing Station: No    Active Consults:   IP CONSULT TO NEPHROLOGY    Length of Stay:  Expected LOS: 6  Actual LOS: 5    Luz Angela RN                            
End of Shift Note    Bedside shift change report given to VALERIA Fernandez (oncoming nurse) by Coreen Bravo RN (offgoing nurse).  Report included the following information SBAR    Shift worked:  [id number]     Shift summary and any significant changes:     Uneventful night.     Concerns for physician to address:      Zone phone for oncoming shift:          Coreen Braov, RN                           
End of Shift Note    Bedside shift change report given to VALERIA Laboy (oncoming nurse) by Rebecca Rosado RN (offgoing nurse).  Report included the following information SBAR, Kardex, Intake/Output, and MAR    Shift worked:  7a-7p     Shift summary and any significant changes:    No significant changes. HD dressing changed, patient not taken for dialysis today, plan for HD tomorrow.   Concerns for physician to address: None   Zone phone for oncoming shift:       Activity:  Level of Assistance: Moderate assist, patient does 50-74%  Number times ambulated in hallways past shift: 0  Number of times OOB to chair past shift: 0    Cardiac:   Cardiac Monitoring: Yes      Cardiac Rhythm: Sinus rhythm    Access:  Current line(s): PIV     Genitourinary:   Urinary Status: Oliguria    Respiratory:   O2 Device: None (Room air)  Chronic home O2 use?: NO  Incentive spirometer at bedside: YES    GI:  Last BM (including prior to admit): 05/11/25  Current diet:  ADULT DIET; Regular  Passing flatus: YES    Pain Management:   Patient states pain is manageable on current regimen: YES    Skin:  Sanket Scale Score: 18  Interventions: Wound Offloading (Prevention Methods): Bed, pressure redistribution/air, Pillows, Repositioning, Turning    Patient Safety:  Fall Risk: Nursing Judgement-Fall Risk High(Add Comments): Yes  Fall Risk Interventions  Nursing Judgement-Fall Risk High(Add Comments): Yes  Toilet Every 2 Hours-In Advance of Need: Yes  Hourly Visual Checks: In bed  Fall Visual Posted: Armband, Fall sign posted, Socks  Room Door Open: Deferred to decrease stimulation  Alarm On: Bed, Chair  Patient Moved Closer to Nursing Station: No    Active Consults:   IP CONSULT TO NEPHROLOGY    Length of Stay:  Expected LOS: 5  Actual LOS: 3    Rebecca Rosado RN                            
End of Shift Note    Bedside shift change report given to VALERIA Laboy (oncoming nurse) by Yane Maddox RN (offgoing nurse).  Report included the following information SBAR, Kardex, Intake/Output, MAR, Recent Results, and Cardiac Rhythm NSR    Shift worked:  7a-7p     Shift summary and any significant changes:     Pt with no c/o lightheadedness today. Pt ambulated 2x this shift with no issues. Nephrology was consulted, no new orders. Pt still pending doppler of BLE and then PT/OT once dopplers result.      Concerns for physician to address:  none     Zone phone for oncoming shift:   [phone]       Activity:  Level of Assistance: Moderate assist, patient does 50-74%  Number times ambulated in hallways past shift: 0  Number of times OOB to chair past shift: 1    Cardiac:   Cardiac Monitoring: Yes      Cardiac Rhythm: Sinus rhythm    Access:  Current line(s): PIV. Hemodialysis central access right chest    Genitourinary:   Urinary Status: Voiding, Oliguria    Respiratory:   O2 Device: None (Room air)  Chronic home O2 use?: NO  Incentive spirometer at bedside: NO    GI:  Last BM (including prior to admit): 05/11/25 (pt guessing, not exactly sure)  Current diet:  ADULT DIET; Regular  Passing flatus: YES    Pain Management:   Patient states pain is manageable on current regimen: YES    Skin:  Sanket Scale Score: 19  Interventions: Wound Offloading (Prevention Methods): Elevate heels    Patient Safety:  Fall Risk: Nursing Judgement-Fall Risk High(Add Comments): Yes  Fall Risk Interventions  Nursing Judgement-Fall Risk High(Add Comments): Yes  Toilet Every 2 Hours-In Advance of Need: Yes  Hourly Visual Checks: Awake, In bed  Fall Visual Posted: Armband, Fall sign posted, Mat, Socks  Room Door Open: Yes  Alarm On: Bed, Chair  Patient Moved Closer to Nursing Station: No    Active Consults:   IP CONSULT TO NEPHROLOGY    Length of Stay:  Expected LOS: 3  Actual LOS: 0    Yane Maddox RN     
Nephrology Progress Note  [facility]  [address], [address]  [place] [address]  Phone - [phone]  Fax - [phone]                   Patient: Gerald Yuen Jr.                     YOB: 1965        Date- 5/13/2025                                     Admit Date: 5/12/2025   CC: Follow up for esrd          IMPRESSION & PLAN:   Esrd -hd - mon- Friday? unit  [place]  Syncope  Hypertension  Anemia of ckd  Sec. hyperpara      PLAN-  No hd indicated today  Check cbc and renal panel in am  Continue norvasc,  Hold midodrine-  Hold epogen     Subjective:  Interval History:   -  patient is admitted with syncope and nausea. He had hd yesterday.  He can't recall his hd unit  He was recently discharged from [facility]  He is not getting hd at [facility] unit.    Objective:   Vitals:    05/13/25 0645 05/13/25 0934 05/13/25 1153 05/13/25 1216   BP:  133/72  (!) 161/80   Pulse:  87  81   Resp:  19  18   Temp:    97.9 °F (36.6 °C)   TempSrc:   Oral Oral   SpO2:  99%  99%   Weight: 70 kg (154 lb 5.2 oz)      Height: 1.88 m (6' 2\")         I/O last 3 completed shifts:  In: 100 [P.O.:100]  Out: -   No intake/output data recorded.      Physical exam:    GEN: NAD  NECK- no mass  RESP: No wheezing, decreased BS b/l  CVS: S1,S2  RRR  NEURO: Normal speech, Non focal  EXT: No Edema   PSYCH: Normal Mood  Permacath +    Chart reviewed.         Pertinent Notes reviewed.       Data Review :  Lab Results   Component Value Date/Time     05/12/2025 05:35 PM    K 3.7 05/12/2025 05:35 PM     05/12/2025 05:35 PM    CO2 31 05/12/2025 05:35 PM    BUN 23 05/12/2025 05:35 PM    CREATININE 5.23 05/12/2025 05:35 PM    GLUCOSE 139 05/12/2025 05:35 PM    CALCIUM 8.9 05/12/2025 05:35 PM       Lab Results   Component Value Date    WBC 6.9 05/12/2025    HGB 10.8 (L) 05/12/2025    HCT 33.5 (L) 05/12/2025    MCV 91.5 05/12/2025     
Orders received and chart reviewed. Pt is pending duplex of BLE to rule out DVT.  Will await test results prior to start of OT services.   
Physical Therapy    Chart reviewed, patient sat EOB with OT and BP dropped to 83/49 with patient complaining of nausea and abdominal pain.  Will defer and continue to follow, patient is scheduled for HD this afternoon.    LIDA OropezaT  
Physical Therapy    Orders received and chart reviewed. Pt is pending duplex of BLE to rule out DVT.  Will await test results prior to start of PT services.     Heather Fields, PT, DPT  
Physical Therapy    Pt seen for PT treatment, full report to follow. Pt remains orthostatic with symptoms present in standing. Unable to tolerate ambulation at this time.       05/15/25 1120 05/15/25 1124 05/15/25 1127   Vital Signs   Pulse 80 85 83   /69 (!) 100/54 113/66   MAP (Calculated) 91 69 82   BP Location Left upper arm  --   --    BP Method Automatic  --   --    Patient Position Semi fowlers Sitting  (after activity (donning shoes)) Sitting      05/15/25 1130 05/15/25 1132 05/15/25 1136   Vital Signs   Pulse 85 80 80   BP (!) 88/46 (!) 151/71 131/78   MAP (Calculated) 60 98 96   BP Location  --   --   --    BP Method  --   --   --    Patient Position Standing  (after pivot to chair; required seated rest before reading complete due to feeling light headed) Sitting  (legs elevated) Sitting  (legs elevated)     Fatmata Ortiz, PT, MPT  
Second attempt for assessment and still awaiting Doppler results to rule out DVT.  Will defer but continue to follow.   
Third attempt and still awaiting results of doppler.  Will follow up tomorrow.   
    05/12/25  1735 05/14/25  0637 05/15/25  0641    135* 136   K 3.7 4.2 4.3    102 104   CO2 31 25 23   BUN 23* 48* 55*   MG  --   --  2.3   PHOS  --   --  5.5*     Recent Labs     05/12/25  1735   GLOB 4.4*     No results for input(s): \"INR\", \"APTT\" in the last 72 hours.    Invalid input(s): \"PTP\"   No results for input(s): \"TIBC\" in the last 72 hours.    Invalid input(s): \"FE\", \"PSAT\", \"FERR\"   No results found for: \"RBCF\"   No results for input(s): \"PH\", \"PCO2\", \"PO2\" in the last 72 hours.  No results for input(s): \"CPK\", \"CKMB\", \"TROPONINI\" in the last 72 hours.  No components found for: \"GLPOC\"  @labua@    MEDICATIONS:  Current Facility-Administered Medications   Medication Dose Route Frequency    amLODIPine (NORVASC) tablet 10 mg  10 mg Oral Daily    aspirin chewable tablet 81 mg  81 mg Oral Daily    atorvastatin (LIPITOR) tablet 40 mg  40 mg Oral Nightly    FLUoxetine (PROZAC) capsule 10 mg  10 mg Oral Daily    midodrine (PROAMATINE) tablet 2.5 mg  2.5 mg Oral Once per day on Monday Wednesday Friday    sucralfate (CARAFATE) tablet 1 g  1 g Oral 4x Daily    Vitamin D (CHOLECALCIFEROL) tablet 2,000 Units  2,000 Units Oral Daily    sodium chloride flush 0.9 % injection 5-40 mL  5-40 mL IntraVENous 2 times per day    sodium chloride flush 0.9 % injection 5-40 mL  5-40 mL IntraVENous PRN    0.9 % sodium chloride infusion   IntraVENous PRN    ondansetron (ZOFRAN-ODT) disintegrating tablet 4 mg  4 mg Oral Q8H PRN    Or    ondansetron (ZOFRAN) injection 4 mg  4 mg IntraVENous Q6H PRN    polyethylene glycol (GLYCOLAX) packet 17 g  17 g Oral Daily PRN    acetaminophen (TYLENOL) tablet 650 mg  650 mg Oral Q6H PRN    Or    acetaminophen (TYLENOL) suppository 650 mg  650 mg Rectal Q6H PRN    heparin (porcine) injection 5,000 Units  5,000 Units SubCUTAneous 3 times per day    insulin lispro (HUMALOG,ADMELOG) injection vial 0-8 Units  0-8 Units SubCUTAneous 4x Daily AC & HS    glucose chewable tablet 16 g  4 
for Physician Visit  \"Followup syncope\".  Discussed with RN events overnight. Feels dizzy when he stands up today, spoke with him twice today.      Objective:     VITALS:   Last 24hrs VS reviewed since prior progress note. Most recent are:  Patient Vitals for the past 24 hrs:   BP Temp Temp src Pulse Resp SpO2   05/17/25 1552 (!) 169/93 98 °F (36.7 °C) Oral 88 17 99 %   05/17/25 1544 -- -- -- 89 -- --   05/17/25 1319 (!) 160/87 97.8 °F (36.6 °C) Oral 88 18 100 %   05/17/25 1242 (!) 148/75 97.7 °F (36.5 °C) -- 92 16 99 %   05/17/25 1230 (!) 106/49 -- -- 92 -- --   05/17/25 1215 (!) 98/48 -- -- 88 -- --   05/17/25 1200 (!) 96/48 -- -- 88 -- --   05/17/25 1145 131/72 -- -- 83 -- --   05/17/25 1130 115/61 -- -- 84 -- --   05/17/25 1115 (!) 126/58 -- -- 82 -- --   05/17/25 1100 (!) 141/72 -- -- 83 -- --   05/17/25 1045 128/65 -- -- 84 -- --   05/17/25 1030 (!) 142/68 -- -- 86 -- --   05/17/25 1015 (!) 141/49 -- -- 86 -- --   05/17/25 1000 138/69 -- -- 89 -- --   05/17/25 0945 (!) 148/78 -- -- 86 -- --   05/17/25 0930 (!) 159/90 -- -- 88 -- --   05/17/25 0915 (!) 173/93 -- -- 87 -- --   05/17/25 0912 (!) 168/90 -- -- 86 -- --   05/17/25 0900 (!) 172/92 98.2 °F (36.8 °C) -- 90 18 98 %   05/17/25 0800 -- -- -- 88 -- --   05/17/25 0750 137/72 98.6 °F (37 °C) Oral 86 20 99 %   05/17/25 0235 (!) 142/63 98.7 °F (37.1 °C) Oral 94 18 95 %   05/16/25 2220 (!) 160/81 98.6 °F (37 °C) Oral 96 18 97 %   05/16/25 1915 (!) 150/70 98.4 °F (36.9 °C) Oral 89 16 95 %         Intake/Output Summary (Last 24 hours) at 5/17/2025 1635  Last data filed at 5/17/2025 1242  Gross per 24 hour   Intake 1350 ml   Output 1200 ml   Net 150 ml        I had a face to face encounter and independently examined this patient on 5/17/2025, as outlined below:  PHYSICAL EXAM:  General: Alert, cooperative  EENT:  EOMI. Anicteric sclerae.  Resp:  CTA bilaterally, no wheezing or rales.  No accessory muscle use  CV:  Regular  rate,  No edema  GI:  Soft, Non

## 2025-05-18 NOTE — CARE COORDINATION
Pt discharging AMA - No CM interventions can be provided at this time.     Valeria Fontaine LCSW  [facility] Care Manager  [phone]

## 2025-05-18 NOTE — PLAN OF CARE
Problem: Chronic Conditions and Co-morbidities  Goal: Patient's chronic conditions and co-morbidity symptoms are monitored and maintained or improved  5/15/2025 0750 by Rebecca Cantu RN  Outcome: Progressing     Problem: Discharge Planning  Goal: Discharge to home or other facility with appropriate resources  5/15/2025 0750 by Rebecca Cantu RN  Outcome: Progressing     Problem: Skin/Tissue Integrity  Goal: Skin integrity remains intact  Description: 1.  Monitor for areas of redness and/or skin breakdown2.  Assess vascular access sites hourly3.  Every 4-6 hours minimum:  Change oxygen saturation probe site4.  Every 4-6 hours:  If on nasal continuous positive airway pressure, respiratory therapy assess nares and determine need for appliance change or resting period  5/15/2025 0750 by Rebecca Cantu RN  Outcome: Progressing     Problem: ABCDS Injury Assessment  Goal: Absence of physical injury  5/15/2025 0750 by Rebecca Cantu RN  Outcome: Progressing     Problem: Safety - Adult  Goal: Free from fall injury  5/15/2025 1944 by Coreen Burks RN  Outcome: Progressing  5/15/2025 0750 by Rebecca Cantu RN  Outcome: Progressing     Problem: Respiratory - Adult  Goal: Achieves optimal ventilation and oxygenation  5/15/2025 0750 by Rebecca Cantu RN  Outcome: Progressing     Problem: Cardiovascular - Adult  Goal: Maintains optimal cardiac output and hemodynamic stability  5/15/2025 0750 by Rebecca Cantu RN  Outcome: Progressing  Goal: Absence of cardiac dysrhythmias or at baseline  5/15/2025 0750 by Rebecca Cantu RN  Outcome: Progressing     Problem: Skin/Tissue Integrity - Adult  Goal: Skin integrity remains intact  Description: 1.  Monitor for areas of redness and/or skin breakdown2.  Assess vascular access sites hourly3.  Every 4-6 hours minimum:  Change oxygen saturation probe site4.  Every 4-6 hours:  If on nasal continuous positive airway pressure, respiratory therapy assess nares and determine need for 
  Problem: Chronic Conditions and Co-morbidities  Goal: Patient's chronic conditions and co-morbidity symptoms are monitored and maintained or improved  5/16/2025 1944 by Narda Baptiste, RN  Outcome: Progressing  5/16/2025 1200 by Rebecca Rosado, RN  Outcome: Progressing     
  Problem: Chronic Conditions and Co-morbidities  Goal: Patient's chronic conditions and co-morbidity symptoms are monitored and maintained or improved  Outcome: Progressing     Problem: Discharge Planning  Goal: Discharge to home or other facility with appropriate resources  5/14/2025 1009 by Rebecca Cantu RN  Outcome: Progressing  5/13/2025 2321 by Viola Parmar RN  Outcome: Progressing     Problem: Skin/Tissue Integrity  Goal: Skin integrity remains intact  Description: 1.  Monitor for areas of redness and/or skin breakdown2.  Assess vascular access sites hourly3.  Every 4-6 hours minimum:  Change oxygen saturation probe site4.  Every 4-6 hours:  If on nasal continuous positive airway pressure, respiratory therapy assess nares and determine need for appliance change or resting period  5/14/2025 1009 by Rebecca Cantu RN  Outcome: Progressing  5/13/2025 2321 by Viola Parmar RN  Outcome: Progressing     Problem: ABCDS Injury Assessment  Goal: Absence of physical injury  5/14/2025 1009 by Rebecca Cantu RN  Outcome: Progressing  5/13/2025 2321 by Viola Parmar RN  Outcome: Progressing     Problem: Safety - Adult  Goal: Free from fall injury  5/14/2025 1009 by Rebecca Cantu RN  Outcome: Progressing  5/13/2025 2321 by Viola Parmar RN  Outcome: Progressing     Problem: Respiratory - Adult  Goal: Achieves optimal ventilation and oxygenation  5/14/2025 1009 by Rebecca Cantu RN  Outcome: Progressing  5/13/2025 2321 by Viola Parmar RN  Outcome: Progressing     Problem: Cardiovascular - Adult  Goal: Maintains optimal cardiac output and hemodynamic stability  5/14/2025 1009 by Rebecca Cantu RN  Outcome: Progressing  5/13/2025 2321 by Viola Parmar RN  Outcome: Progressing  Goal: Absence of cardiac dysrhythmias or at baseline  Outcome: Progressing     Problem: Skin/Tissue Integrity - Adult  Goal: Skin integrity remains intact  Description: 1.  Monitor for areas of redness and/or skin breakdown2.  Assess vascular 
  Problem: Chronic Conditions and Co-morbidities  Goal: Patient's chronic conditions and co-morbidity symptoms are monitored and maintained or improved  Outcome: Progressing     Problem: Discharge Planning  Goal: Discharge to home or other facility with appropriate resources  Outcome: Progressing     Problem: Skin/Tissue Integrity  Goal: Skin integrity remains intact  Description: 1.  Monitor for areas of redness and/or skin breakdown2.  Assess vascular access sites hourly3.  Every 4-6 hours minimum:  Change oxygen saturation probe site4.  Every 4-6 hours:  If on nasal continuous positive airway pressure, respiratory therapy assess nares and determine need for appliance change or resting period  Outcome: Progressing     Problem: ABCDS Injury Assessment  Goal: Absence of physical injury  Outcome: Progressing     Problem: Safety - Adult  Goal: Free from fall injury  5/15/2025 0750 by Rebecca Cantu, RN  Outcome: Progressing  5/14/2025 2224 by Coreen Burks, RN  Outcome: Progressing     Problem: Respiratory - Adult  Goal: Achieves optimal ventilation and oxygenation  Outcome: Progressing     Problem: Cardiovascular - Adult  Goal: Maintains optimal cardiac output and hemodynamic stability  Outcome: Progressing  Goal: Absence of cardiac dysrhythmias or at baseline  Outcome: Progressing     Problem: Skin/Tissue Integrity - Adult  Goal: Skin integrity remains intact  Description: 1.  Monitor for areas of redness and/or skin breakdown2.  Assess vascular access sites hourly3.  Every 4-6 hours minimum:  Change oxygen saturation probe site4.  Every 4-6 hours:  If on nasal continuous positive airway pressure, respiratory therapy assess nares and determine need for appliance change or resting period  Outcome: Progressing  Goal: Incisions, wounds, or drain sites healing without S/S of infection  Outcome: Progressing     Problem: Musculoskeletal - Adult  Goal: Return mobility to safest level of function  Outcome: 
  Problem: Chronic Conditions and Co-morbidities  Goal: Patient's chronic conditions and co-morbidity symptoms are monitored and maintained or improved  Outcome: Progressing     Problem: Discharge Planning  Goal: Discharge to home or other facility with appropriate resources  Outcome: Progressing     Problem: Skin/Tissue Integrity  Goal: Skin integrity remains intact  Description: 1.  Monitor for areas of redness and/or skin breakdown2.  Assess vascular access sites hourly3.  Every 4-6 hours minimum:  Change oxygen saturation probe site4.  Every 4-6 hours:  If on nasal continuous positive airway pressure, respiratory therapy assess nares and determine need for appliance change or resting period  Outcome: Progressing     Problem: ABCDS Injury Assessment  Goal: Absence of physical injury  Outcome: Progressing     Problem: Safety - Adult  Goal: Free from fall injury  Outcome: Progressing     Problem: Respiratory - Adult  Goal: Achieves optimal ventilation and oxygenation  Outcome: Progressing     Problem: Cardiovascular - Adult  Goal: Maintains optimal cardiac output and hemodynamic stability  Outcome: Progressing  Goal: Absence of cardiac dysrhythmias or at baseline  Outcome: Progressing     Problem: Skin/Tissue Integrity - Adult  Goal: Skin integrity remains intact  Description: 1.  Monitor for areas of redness and/or skin breakdown2.  Assess vascular access sites hourly3.  Every 4-6 hours minimum:  Change oxygen saturation probe site4.  Every 4-6 hours:  If on nasal continuous positive airway pressure, respiratory therapy assess nares and determine need for appliance change or resting period  Outcome: Progressing  Goal: Incisions, wounds, or drain sites healing without S/S of infection  Outcome: Progressing     Problem: Musculoskeletal - Adult  Goal: Return mobility to safest level of function  Outcome: Progressing  Goal: Return ADL status to a safe level of function  Outcome: Progressing     Problem: 
  Problem: Chronic Conditions and Co-morbidities  Goal: Patient's chronic conditions and co-morbidity symptoms are monitored and maintained or improved  Recent Flowsheet Documentation  Taken 5/17/2025 0800 by Rick Alves, RN  Care Plan - Patient's Chronic Conditions and Co-Morbidity Symptoms are Monitored and Maintained or Improved:   Monitor and assess patient's chronic conditions and comorbid symptoms for stability, deterioration, or improvement   Collaborate with multidisciplinary team to address chronic and comorbid conditions and prevent exacerbation or deterioration   Update acute care plan with appropriate goals if chronic or comorbid symptoms are exacerbated and prevent overall improvement and discharge  5/16/2025 1944 by Narda Baptiste, RN  Outcome: Progressing     Problem: Discharge Planning  Goal: Discharge to home or other facility with appropriate resources  5/17/2025 0851 by Rick Alves RN  Outcome: Progressing  Flowsheets (Taken 5/17/2025 0800)  Discharge to home or other facility with appropriate resources:   Identify barriers to discharge with patient and caregiver   Arrange for needed discharge resources and transportation as appropriate   Identify discharge learning needs (meds, wound care, etc)   Arrange for interpreters to assist at discharge as needed   Refer to discharge planning if patient needs post-hospital services based on physician order or complex needs related to functional status, cognitive ability or social support system  5/16/2025 1944 by Narda Baptiste, RN  Outcome: Progressing     Problem: Skin/Tissue Integrity  Goal: Skin integrity remains intact  Description: 1.  Monitor for areas of redness and/or skin breakdown2.  Assess vascular access sites hourly3.  Every 4-6 hours minimum:  Change oxygen saturation probe site4.  Every 4-6 hours:  If on nasal continuous positive airway pressure, respiratory therapy assess nares and determine need for appliance change or resting 
  Problem: Discharge Planning  Goal: Discharge to home or other facility with appropriate resources  5/13/2025 2321 by Viola Parmar, RN  Outcome: Progressing     Problem: Skin/Tissue Integrity  Goal: Skin integrity remains intact  Description: 1.  Monitor for areas of redness and/or skin breakdown2.  Assess vascular access sites hourly3.  Every 4-6 hours minimum:  Change oxygen saturation probe site4.  Every 4-6 hours:  If on nasal continuous positive airway pressure, respiratory therapy assess nares and determine need for appliance change or resting period  5/13/2025 2321 by Viola Parmar, RN  Outcome: Progressing     Problem: ABCDS Injury Assessment  Goal: Absence of physical injury  5/13/2025 2321 by Viola Parmar, RN  Outcome: Progressing     Problem: Safety - Adult  Goal: Free from fall injury  5/13/2025 2321 by Viola Parmar, RN  Outcome: Progressing     Problem: Respiratory - Adult  Goal: Achieves optimal ventilation and oxygenation  Outcome: Progressing     Problem: Cardiovascular - Adult  Goal: Maintains optimal cardiac output and hemodynamic stability  Outcome: Progressing     Problem: Gastrointestinal - Adult  Goal: Minimal or absence of nausea and vomiting  Outcome: Progressing     Problem: Metabolic/Fluid and Electrolytes - Adult  Goal: Glucose maintained within prescribed range  Outcome: Progressing     
  Problem: Discharge Planning  Goal: Discharge to home or other facility with appropriate resources  5/17/2025 2017 by Luz Angela RN  Outcome: Progressing  5/17/2025 0851 by Rick Alves, RN  Outcome: Progressing  Flowsheets (Taken 5/17/2025 0800)  Discharge to home or other facility with appropriate resources:   Identify barriers to discharge with patient and caregiver   Arrange for needed discharge resources and transportation as appropriate   Identify discharge learning needs (meds, wound care, etc)   Arrange for interpreters to assist at discharge as needed   Refer to discharge planning if patient needs post-hospital services based on physician order or complex needs related to functional status, cognitive ability or social support system     Problem: Skin/Tissue Integrity  Goal: Skin integrity remains intact  Description: 1.  Monitor for areas of redness and/or skin breakdown2.  Assess vascular access sites hourly3.  Every 4-6 hours minimum:  Change oxygen saturation probe site4.  Every 4-6 hours:  If on nasal continuous positive airway pressure, respiratory therapy assess nares and determine need for appliance change or resting period  5/17/2025 2017 by Luz Angela RN  Outcome: Progressing  5/17/2025 0851 by Rick Alves, RN  Outcome: Progressing  Flowsheets (Taken 5/17/2025 0800)  Skin Integrity Remains Intact:   Monitor for areas of redness and/or skin breakdown   Assess vascular access sites hourly   Turn and reposition as indicated   Assess need for specialty bed   Positioning devices   Pressure redistribution bed/mattress (bed type)   Check visual cues for pain   Monitor skin under medical devices     
  Problem: Occupational Therapy - Adult  Goal: By Discharge: Performs self-care activities at highest level of function for planned discharge setting.  See evaluation for individualized goals.  Description: FUNCTIONAL STATUS PRIOR TO ADMISSION:  ambulated without assist devices, performed all ADLs and light IADLS on his own, parents drive pt to dialysis 2x wk, takes midodrine on dialysis days, was able to manage a full flight of steps to second floor bedroom on his own   ,  ,  ,  ,  ,  ,  ,  ,  ,  ,       HOME SUPPORT: Patient lived with his parents.    Occupational Therapy Goals:  Initiated 5/14/2025  1.  Patient will perform grooming with Supervision within 7 day(s).  2.  Patient will perform upper body dressing and lower body dressing with Supervision within 7 day(s).  3.  Patient will perform toileting with Supervision within 7 day(s).  4.  Patient will perform toilet transfers with Supervision  within 7 day(s).    Outcome: Progressing   OCCUPATIONAL THERAPY TREATMENT  Patient: Gerald Yuen Jr. (59 y.o. male)  Date: 5/15/2025  Primary Diagnosis: Syncope and collapse [R55]  ESRD (end stage renal disease) on dialysis (HCC) [N18.6, Z99.2]  Labile blood pressure [R09.89]       Precautions: Bed Alarm, Fall Risk                Chart, occupational therapy assessment, plan of care, and goals were reviewed.    ASSESSMENT  Patient continues to benefit from skilled OT services and is slowly progressing towards goals. Pt remains limited by symptomatic orthostatic hypotension impacting his ADL output in standing and functional mobility. Pt received OOB in chair, alert and oriented x4. Pt with flat affect, however, agreeable to therapy. Pt reports fairly sedentary lifestyle and he \"manages\" hypotension by sitting for most tasks. Pt overall SBA-CGA for functional transfer, however, limited standing tolerance with mild LOB and fatigue noted, and c/o \"eyes getting fuzzy\" in standing. Pt reports he has rollator, educated pt 
  Problem: Occupational Therapy - Adult  Goal: By Discharge: Performs self-care activities at highest level of function for planned discharge setting.  See evaluation for individualized goals.  Description: FUNCTIONAL STATUS PRIOR TO ADMISSION:  ambulated without assist devices, performed all ADLs and light IADLS on his own, parents drive pt to dialysis 2x wk, takes midodrine on dialysis days, was able to manage a full flight of steps to second floor bedroom on his own   ,  ,  ,  ,  ,  ,  ,  ,  ,  ,       HOME SUPPORT: Patient lived with his parents.    Occupational Therapy Goals:  Initiated 5/14/2025  1.  Patient will perform grooming with Supervision within 7 day(s).  2.  Patient will perform upper body dressing and lower body dressing with Supervision within 7 day(s).  3.  Patient will perform toileting with Supervision within 7 day(s).  4.  Patient will perform toilet transfers with Supervision  within 7 day(s).    Outcome: Progressing   OCCUPATIONAL THERAPY TREATMENT  Patient: Gerald Yuen Jr. (59 y.o. male)  Date: 5/16/2025  Primary Diagnosis: Syncope and collapse [R55]  ESRD (end stage renal disease) on dialysis (HCC) [N18.6, Z99.2]  Labile blood pressure [R09.89]       Precautions: Bed Alarm, Fall Risk                Chart, occupational therapy assessment, plan of care, and goals were reviewed.    ASSESSMENT  Patient continues to benefit from skilled OT services and is slowly progressing towards goals. Pt continues to be limited by symptomatic orthostatic hypotension and associated nausea today. Pt overall SBA for bed mobility. Pt sitting tolerance impaired due to dizziness, generalized weakness, nausea and c/o abdominal pain. Deferred OOB activity/ADLs due to +OH that did not resolve with active exercise. Pt will continue to benefit from acute OT services as tolerated.     05/16/25 1100 05/16/25 1104 05/16/25 1108   Vital Signs   Pulse  --  94  93   BP  --  (!) 83/49  (!) 156/73   MAP (Calculated)  --  
  Problem: Physical Therapy - Adult  Goal: By Discharge: Performs mobility at highest level of function for planned discharge setting.  See evaluation for individualized goals.  Description: FUNCTIONAL STATUS PRIOR TO ADMISSION: Patient was independent and active without use of DME.  Ag RW and rollator at home but does not have to use them, able to go up and down steps to second floor bedroom.  Has history of syncopy due to orthostatic hypotension.    HOME SUPPORT PRIOR TO ADMISSION: The patient lived with parents, indep with ADLs.  Parents drive him to to HD.      Physical Therapy Goals  Initiated 5/14/2025  1.  Patient will move from supine to sit and sit to supine in bed with supervision/set-up within 7 day(s).    2.  Patient will perform sit to stand with supervision/set-up within 7 day(s).  3.  Patient will transfer from bed to chair and chair to bed with supervision/set-up using the least restrictive device within 7 day(s).  4.  Patient will ambulate with supervision/set-up for 150 feet with the least restrictive device within 7 day(s).   5.  Patient will ascend/descend 13 stairs with  handrail(s) with contact guard assist within 7 day(s).   5/14/2025 1425 by Ekta Musa, PT  Outcome: Not Progressing     Problem: Occupational Therapy - Adult  Goal: By Discharge: Performs self-care activities at highest level of function for planned discharge setting.  See evaluation for individualized goals.  Description: FUNCTIONAL STATUS PRIOR TO ADMISSION:  ambulated without assist devices, performed all ADLs and light IADLS on his own, parents drive pt to dialysis 2x wk, takes midodrine on dialysis days, was able to manage a full flight of steps to second floor bedroom on his own   ,  ,  ,  ,  ,  ,  ,  ,  ,  ,       HOME SUPPORT: Patient lived with his parents.    Occupational Therapy Goals:  Initiated 5/14/2025  1.  Patient will perform grooming with Supervision within 7 day(s).  2.  Patient will perform upper body 
retention  Outcome: Adequate for Discharge     Problem: Metabolic/Fluid and Electrolytes - Adult  Goal: Electrolytes maintained within normal limits  Outcome: Adequate for Discharge  Goal: Hemodynamic stability and optimal renal function maintained  Outcome: Adequate for Discharge  Goal: Glucose maintained within prescribed range  Outcome: Adequate for Discharge     
5 YEARS  6/28/2024    IR NONTUNNELED VASCULAR CATHETER 6/28/2024 Iqra Rolle APRN - NP [facility]    IR TUNNELED CVC PLACE WO SQ PORT/PUMP > 5 YEARS  7/1/2024    IR TUNNELED CATHETER PLACEMENT GREATER THAN 5 YEARS 7/1/2024 Jaylyn Oliva PA [facility]          Expanded or extensive additional review of patient history:   Social/Functional History  Lives With: Parent  Type of Home: House  Home Layout: Two level (bedroom on second floor, 15 steps with right side rail)  Home Access: Stairs to enter with rails  Entrance Stairs - Number of Steps: 5  Entrance Stairs - Rails: Right  Bathroom Shower/Tub: Walk-in shower  Bathroom Toilet: Standard  Bathroom Equipment: Shower chair, Grab bars in shower, Hand-held shower  Home Equipment: Cane, Rollator, Walker - Rolling  Has the patient had two or more falls in the past year or any fall with injury in the past year?: Yes (syncope)      Hand Dominance: right     EXAMINATION OF PERFORMANCE DEFICITS:    Cognitive/Behavioral Status:  Orientation  Orientation Level: Oriented X4    Vision/Perceptual:    Vision - Basic Assessment  Prior Vision: Wears glasses only for reading                     Range of Motion:   AROM: Within functional limits         Strength:  Strength: Generally decreased, functional      Coordination:  Coordination: Within functional limits     Coordination: Within functional limits      Tone & Sensation:   Tone: Normal  Sensation: Intact          Functional Mobility and Transfers for ADLs:    Bed Mobility:     Bed Mobility Training  Bed Mobility Training: Yes  Interventions: Safety awareness training;Verbal cues  Supine to Sit: Contact guard assistance  Sit to Supine: Partial/Moderate assistance;2 Person assistance    Transfers:      Transfer Training  Transfer Training: No (orthostatic in sitting)                     Balance:      Balance  Sitting: Impaired  Sitting - Static: Fair (occasional)      ADL Assessment:          Feeding: Independent   
Training:     Gait  Gait Training: No        Pain Rating:  No c/o pain    Activity Tolerance:   Poor and signs and symptoms of orthostatic hypotension    After treatment:   Patient left in no apparent distress sitting up in chair, Call bell within reach, Bed/ chair alarm activated, Updated patient's board on functional status and mobility recommendations, and legs elevated      COMMUNICATION/EDUCATION:   The patient's plan of care was discussed with: registered nurse and physician    Patient Education  Education Given To: Patient  Education Provided: Role of Therapy;Plan of Care;Fall Prevention Strategies;Transfer Training;Mobility Training  Education Provided Comments: discussed OH and associated fall risk  Education Method: Verbal  Barriers to Learning: None  Education Outcome: Verbalized understanding;Continued education needed      Fatmata Ortiz, PT  Minutes: 29   
symptoms of orthostatic hypotension    After treatment:   Patient left in no apparent distress in bed, Call bell within reach, Bed/ chair alarm activated, and Side rails x3    COMMUNICATION/EDUCATION:   The patient's plan of care was discussed with: occupational therapist and registered nurse    Patient Education  Education Given To: Patient  Education Provided: Role of Therapy;Plan of Care;Home Exercise Program;Fall Prevention Strategies;Mobility Training  Education Method: Verbal  Barriers to Learning: None  Education Outcome: Verbalized understanding;Demonstrated understanding;Continued education needed    Thank you for this referral.  Ekta Musa, PT  Minutes: 21      Physical Therapy Evaluation Charge Determination   History Examination Presentation Decision-Making   MEDIUM  Complexity : 1-2 comorbidities / personal factors will impact the outcome/ POC  LOW Complexity : 1-2 Standardized tests and measures addressing body structure, function, activity limitation and / or participation in recreation  LOW Complexity : Stable, uncomplicated  AM-PAC  LOW    Based on the above components, the patient evaluation is determined to be of the following complexity level: Low

## 2025-05-19 NOTE — DISCHARGE SUMMARY
midodrine 5 MG tablet  Commonly known as: PROAMATINE  Take 1 tablet by mouth Every Monday, Wednesday, and Friday  What changed:   medication strength  how much to take            STOP taking these medications      amLODIPine 10 MG tablet  Commonly known as: NORVASC            ASK your doctor about these medications      aspirin 81 MG chewable tablet  Take 1 tablet by mouth daily     atorvastatin 40 MG tablet  Commonly known as: LIPITOR  Take 1 tablet by mouth nightly     clopidogrel 75 MG tablet  Commonly known as: PLAVIX  Take 1 tablet by mouth daily for 14 doses     FLUoxetine 10 MG capsule  Commonly known as: PROZAC     insulin lispro protamine & lispro (75-25) 100 UNIT per ML Susp injection vial  Commonly known as: HumaLOG MIX     ondansetron 4 MG tablet  Commonly known as: ZOFRAN     sucralfate 1 GM tablet  Commonly known as: CARAFATE     vitamin D 50 MCG (2000 UT) Caps capsule  Commonly known as: CHOLECALCIFEROL               Where to Get Your Medications        These medications were sent to [facility] [address] [place] [address] [address] [facility] [phone] - F [phone] [address] [facility] [address]      Phone: [phone]   midodrine 5 MG tablet             DISPOSITION:    Left AMA        Code status: Full  Recommended diet: cardiac diet and diabetic diet  Recommended activity: activity as tolerated  Wound care: None      Follow up with:   PCP : Marcy Padilla MD Alfaraj, Fatimah, MD  [address] [facility] [address] [phone]    Schedule an appointment as soon as possible for a visit in 1 week(s)        Total time in minutes spent coordinating this discharge :  35 minutes    David L Mendel, MD

## 2025-07-24 ENCOUNTER — TRANSCRIBE ORDERS (OUTPATIENT)
Facility: HOSPITAL | Age: 60
End: 2025-07-24

## 2025-07-24 DIAGNOSIS — E08.621 DIABETIC ULCER OF TOE OF LEFT FOOT ASSOCIATED WITH DIABETES MELLITUS DUE TO UNDERLYING CONDITION, UNSPECIFIED ULCER STAGE (HCC): Primary | ICD-10-CM

## 2025-07-24 DIAGNOSIS — L97.529 DIABETIC ULCER OF TOE OF LEFT FOOT ASSOCIATED WITH DIABETES MELLITUS DUE TO UNDERLYING CONDITION, UNSPECIFIED ULCER STAGE (HCC): Primary | ICD-10-CM

## 2025-08-20 ENCOUNTER — HOSPITAL ENCOUNTER (OUTPATIENT)
Facility: HOSPITAL | Age: 60
Discharge: HOME | End: 2025-08-23
Payer: MEDICARE

## 2025-08-20 DIAGNOSIS — E08.621 DIABETIC ULCER OF TOE OF LEFT FOOT ASSOCIATED WITH DIABETES MELLITUS DUE TO UNDERLYING CONDITION, UNSPECIFIED ULCER STAGE (HCC): ICD-10-CM

## 2025-08-20 DIAGNOSIS — L97.529 DIABETIC ULCER OF TOE OF LEFT FOOT ASSOCIATED WITH DIABETES MELLITUS DUE TO UNDERLYING CONDITION, UNSPECIFIED ULCER STAGE (HCC): ICD-10-CM

## 2025-08-20 PROCEDURE — 73718 MRI LOWER EXTREMITY W/O DYE: CPT

## 2025-08-28 ENCOUNTER — APPOINTMENT (OUTPATIENT)
Facility: HOSPITAL | Age: 60
End: 2025-08-28
Payer: MEDICARE

## 2025-08-28 ENCOUNTER — HOSPITAL ENCOUNTER (INPATIENT)
Facility: HOSPITAL | Age: 60
LOS: 8 days | Discharge: HOME | End: 2025-09-05
Attending: STUDENT IN AN ORGANIZED HEALTH CARE EDUCATION/TRAINING PROGRAM | Admitting: INTERNAL MEDICINE
Payer: MEDICARE

## 2025-08-28 DIAGNOSIS — N18.6 ESRD (END STAGE RENAL DISEASE) ON DIALYSIS (HCC): ICD-10-CM

## 2025-08-28 DIAGNOSIS — M86.9 OSTEOMYELITIS OF LEFT FOOT, UNSPECIFIED TYPE (HCC): Primary | ICD-10-CM

## 2025-08-28 DIAGNOSIS — Z99.2 ESRD (END STAGE RENAL DISEASE) ON DIALYSIS (HCC): ICD-10-CM

## 2025-08-28 DIAGNOSIS — M86.172 ACUTE OSTEOMYELITIS OF TOE OF LEFT FOOT (HCC): ICD-10-CM

## 2025-08-28 LAB
ALBUMIN SERPL-MCNC: 3.1 G/DL (ref 3.5–5.2)
ALBUMIN/GLOB SERPL: 0.9 (ref 1.1–2.2)
ALP SERPL-CCNC: 72 U/L (ref 40–129)
ALT SERPL-CCNC: 9 U/L (ref 10–50)
ANION GAP SERPL CALC-SCNC: 14 MMOL/L (ref 2–14)
AST SERPL-CCNC: 15 U/L (ref 10–50)
BASOPHILS # BLD: 0.04 K/UL (ref 0–0.1)
BASOPHILS NFR BLD: 0.7 % (ref 0–1)
BILIRUB SERPL-MCNC: 0.3 MG/DL (ref 0–1.2)
BUN SERPL-MCNC: 55 MG/DL (ref 6–20)
BUN/CREAT SERPL: 6 (ref 12–20)
CALCIUM SERPL-MCNC: 8.8 MG/DL (ref 8.6–10)
CHLORIDE SERPL-SCNC: 101 MMOL/L (ref 98–107)
CO2 SERPL-SCNC: 23 MMOL/L (ref 20–29)
COMMENT:: NORMAL
CREAT SERPL-MCNC: 9.6 MG/DL (ref 0.7–1.2)
CRP SERPL-MCNC: 2.8 MG/DL (ref 0–0.5)
DIFFERENTIAL METHOD BLD: ABNORMAL
EKG ATRIAL RATE: 83 BPM
EKG DIAGNOSIS: NORMAL
EKG P AXIS: 87 DEGREES
EKG P-R INTERVAL: 152 MS
EKG Q-T INTERVAL: 366 MS
EKG QRS DURATION: 76 MS
EKG QTC CALCULATION (BAZETT): 430 MS
EKG R AXIS: 3 DEGREES
EKG T AXIS: 32 DEGREES
EKG VENTRICULAR RATE: 83 BPM
EOSINOPHIL # BLD: 0.11 K/UL (ref 0–0.4)
EOSINOPHIL NFR BLD: 1.8 % (ref 0–7)
ERYTHROCYTE [DISTWIDTH] IN BLOOD BY AUTOMATED COUNT: 12.7 % (ref 11.5–14.5)
ERYTHROCYTE [SEDIMENTATION RATE] IN BLOOD: 52 MM/HR (ref 0–20)
GLOBULIN SER CALC-MCNC: 3.6 G/DL (ref 2–4)
GLUCOSE BLD STRIP.AUTO-MCNC: 140 MG/DL (ref 65–117)
GLUCOSE BLD STRIP.AUTO-MCNC: 142 MG/DL (ref 65–117)
GLUCOSE SERPL-MCNC: 178 MG/DL (ref 65–100)
HCT VFR BLD AUTO: 30.5 % (ref 36.6–50.3)
HGB BLD-MCNC: 9.8 G/DL (ref 12.1–17)
IMM GRANULOCYTES # BLD AUTO: 0.03 K/UL (ref 0–0.04)
IMM GRANULOCYTES NFR BLD AUTO: 0.5 % (ref 0–0.5)
LYMPHOCYTES # BLD: 1.47 K/UL (ref 0.8–3.5)
LYMPHOCYTES NFR BLD: 24.5 % (ref 12–49)
MCH RBC QN AUTO: 30 PG (ref 26–34)
MCHC RBC AUTO-ENTMCNC: 32.1 G/DL (ref 30–36.5)
MCV RBC AUTO: 93.3 FL (ref 80–99)
MONOCYTES # BLD: 0.6 K/UL (ref 0–1)
MONOCYTES NFR BLD: 10 % (ref 5–13)
NEUTS SEG # BLD: 3.76 K/UL (ref 1.8–8)
NEUTS SEG NFR BLD: 62.5 % (ref 32–75)
NRBC # BLD: 0 K/UL (ref 0–0.01)
NRBC BLD-RTO: 0 PER 100 WBC
PLATELET # BLD AUTO: 214 K/UL (ref 150–400)
PMV BLD AUTO: 9.3 FL (ref 8.9–12.9)
POTASSIUM SERPL-SCNC: 5.3 MMOL/L (ref 3.5–5.1)
PROCALCITONIN SERPL-MCNC: 0.63 NG/ML
PROT SERPL-MCNC: 6.8 G/DL (ref 6.4–8.3)
RBC # BLD AUTO: 3.27 M/UL (ref 4.1–5.7)
SERVICE CMNT-IMP: ABNORMAL
SERVICE CMNT-IMP: ABNORMAL
SODIUM SERPL-SCNC: 138 MMOL/L (ref 136–145)
SPECIMEN HOLD: NORMAL
WBC # BLD AUTO: 6 K/UL (ref 4.1–11.1)

## 2025-08-28 PROCEDURE — 2500000003 HC RX 250 WO HCPCS: Performed by: STUDENT IN AN ORGANIZED HEALTH CARE EDUCATION/TRAINING PROGRAM

## 2025-08-28 PROCEDURE — 82962 GLUCOSE BLOOD TEST: CPT

## 2025-08-28 PROCEDURE — 84145 PROCALCITONIN (PCT): CPT

## 2025-08-28 PROCEDURE — 2500000003 HC RX 250 WO HCPCS: Performed by: INTERNAL MEDICINE

## 2025-08-28 PROCEDURE — 86140 C-REACTIVE PROTEIN: CPT

## 2025-08-28 PROCEDURE — 6360000002 HC RX W HCPCS: Performed by: INTERNAL MEDICINE

## 2025-08-28 PROCEDURE — 87040 BLOOD CULTURE FOR BACTERIA: CPT

## 2025-08-28 PROCEDURE — 96365 THER/PROPH/DIAG IV INF INIT: CPT

## 2025-08-28 PROCEDURE — 96375 TX/PRO/DX INJ NEW DRUG ADDON: CPT

## 2025-08-28 PROCEDURE — 2580000003 HC RX 258: Performed by: STUDENT IN AN ORGANIZED HEALTH CARE EDUCATION/TRAINING PROGRAM

## 2025-08-28 PROCEDURE — 6370000000 HC RX 637 (ALT 250 FOR IP): Performed by: INTERNAL MEDICINE

## 2025-08-28 PROCEDURE — 99285 EMERGENCY DEPT VISIT HI MDM: CPT

## 2025-08-28 PROCEDURE — 85652 RBC SED RATE AUTOMATED: CPT

## 2025-08-28 PROCEDURE — 1100000000 HC RM PRIVATE

## 2025-08-28 PROCEDURE — 6360000002 HC RX W HCPCS: Performed by: STUDENT IN AN ORGANIZED HEALTH CARE EDUCATION/TRAINING PROGRAM

## 2025-08-28 PROCEDURE — 71045 X-RAY EXAM CHEST 1 VIEW: CPT

## 2025-08-28 PROCEDURE — 85025 COMPLETE CBC W/AUTO DIFF WBC: CPT

## 2025-08-28 PROCEDURE — 93005 ELECTROCARDIOGRAM TRACING: CPT | Performed by: PHYSICIAN ASSISTANT

## 2025-08-28 PROCEDURE — 80053 COMPREHEN METABOLIC PANEL: CPT

## 2025-08-28 RX ORDER — HEPARIN SODIUM 5000 [USP'U]/ML
5000 INJECTION, SOLUTION INTRAVENOUS; SUBCUTANEOUS EVERY 8 HOURS SCHEDULED
Status: DISCONTINUED | OUTPATIENT
Start: 2025-08-28 | End: 2025-09-05 | Stop reason: HOSPADM

## 2025-08-28 RX ORDER — SODIUM CHLORIDE 9 MG/ML
INJECTION, SOLUTION INTRAVENOUS PRN
Status: DISCONTINUED | OUTPATIENT
Start: 2025-08-28 | End: 2025-09-05 | Stop reason: HOSPADM

## 2025-08-28 RX ORDER — MIRTAZAPINE 15 MG/1
15 TABLET, FILM COATED ORAL NIGHTLY
Status: DISCONTINUED | OUTPATIENT
Start: 2025-08-28 | End: 2025-08-30

## 2025-08-28 RX ORDER — SODIUM CHLORIDE 0.9 % (FLUSH) 0.9 %
5-40 SYRINGE (ML) INJECTION EVERY 12 HOURS SCHEDULED
Status: DISCONTINUED | OUTPATIENT
Start: 2025-08-28 | End: 2025-09-05 | Stop reason: HOSPADM

## 2025-08-28 RX ORDER — HYDROMORPHONE HYDROCHLORIDE 1 MG/ML
1 INJECTION, SOLUTION INTRAMUSCULAR; INTRAVENOUS; SUBCUTANEOUS EVERY 4 HOURS PRN
Status: DISCONTINUED | OUTPATIENT
Start: 2025-08-28 | End: 2025-09-05 | Stop reason: HOSPADM

## 2025-08-28 RX ORDER — ONDANSETRON 4 MG/1
4 TABLET, ORALLY DISINTEGRATING ORAL EVERY 8 HOURS PRN
Status: DISCONTINUED | OUTPATIENT
Start: 2025-08-28 | End: 2025-09-05 | Stop reason: HOSPADM

## 2025-08-28 RX ORDER — ACETAMINOPHEN 325 MG/1
650 TABLET ORAL EVERY 6 HOURS PRN
Status: DISCONTINUED | OUTPATIENT
Start: 2025-08-28 | End: 2025-09-05 | Stop reason: HOSPADM

## 2025-08-28 RX ORDER — FLUOXETINE 10 MG/1
10 CAPSULE ORAL DAILY
Status: DISCONTINUED | OUTPATIENT
Start: 2025-08-28 | End: 2025-08-30

## 2025-08-28 RX ORDER — ATORVASTATIN CALCIUM 40 MG/1
40 TABLET, FILM COATED ORAL NIGHTLY
Status: DISCONTINUED | OUTPATIENT
Start: 2025-08-28 | End: 2025-08-30

## 2025-08-28 RX ORDER — VITAMIN B COMPLEX
2000 TABLET ORAL DAILY
Status: DISCONTINUED | OUTPATIENT
Start: 2025-08-28 | End: 2025-08-30

## 2025-08-28 RX ORDER — ACETAMINOPHEN 650 MG/1
650 SUPPOSITORY RECTAL EVERY 6 HOURS PRN
Status: DISCONTINUED | OUTPATIENT
Start: 2025-08-28 | End: 2025-09-05 | Stop reason: HOSPADM

## 2025-08-28 RX ORDER — POLYETHYLENE GLYCOL 3350 17 G/17G
17 POWDER, FOR SOLUTION ORAL DAILY PRN
Status: DISCONTINUED | OUTPATIENT
Start: 2025-08-28 | End: 2025-09-05 | Stop reason: HOSPADM

## 2025-08-28 RX ORDER — CLOPIDOGREL BISULFATE 75 MG/1
75 TABLET ORAL DAILY
Status: DISCONTINUED | OUTPATIENT
Start: 2025-08-28 | End: 2025-08-30

## 2025-08-28 RX ORDER — ONDANSETRON 2 MG/ML
4 INJECTION INTRAMUSCULAR; INTRAVENOUS EVERY 6 HOURS PRN
Status: DISCONTINUED | OUTPATIENT
Start: 2025-08-28 | End: 2025-09-05 | Stop reason: HOSPADM

## 2025-08-28 RX ORDER — MIRTAZAPINE 15 MG/1
TABLET, FILM COATED ORAL
COMMUNITY
Start: 2025-08-12

## 2025-08-28 RX ORDER — SODIUM CHLORIDE 0.9 % (FLUSH) 0.9 %
5-40 SYRINGE (ML) INJECTION PRN
Status: DISCONTINUED | OUTPATIENT
Start: 2025-08-28 | End: 2025-09-05 | Stop reason: HOSPADM

## 2025-08-28 RX ORDER — OXYCODONE HYDROCHLORIDE 5 MG/1
5 TABLET ORAL EVERY 4 HOURS PRN
Status: DISCONTINUED | OUTPATIENT
Start: 2025-08-28 | End: 2025-09-05 | Stop reason: HOSPADM

## 2025-08-28 RX ORDER — SUCRALFATE 1 G/1
1 TABLET ORAL 4 TIMES DAILY
Status: DISCONTINUED | OUTPATIENT
Start: 2025-08-28 | End: 2025-08-30

## 2025-08-28 RX ORDER — ASPIRIN 81 MG/1
81 TABLET, CHEWABLE ORAL DAILY
Status: DISCONTINUED | OUTPATIENT
Start: 2025-08-28 | End: 2025-08-30

## 2025-08-28 RX ORDER — INSULIN LISPRO 100 [IU]/ML
0-8 INJECTION, SOLUTION INTRAVENOUS; SUBCUTANEOUS
Status: DISCONTINUED | OUTPATIENT
Start: 2025-08-28 | End: 2025-09-05 | Stop reason: HOSPADM

## 2025-08-28 RX ADMIN — WATER 2000 MG: 1 INJECTION INTRAMUSCULAR; INTRAVENOUS; SUBCUTANEOUS at 14:15

## 2025-08-28 RX ADMIN — SODIUM ZIRCONIUM CYCLOSILICATE 10 G: 10 POWDER, FOR SUSPENSION ORAL at 17:34

## 2025-08-28 RX ADMIN — VANCOMYCIN HYDROCHLORIDE 1750 MG: 10 INJECTION, POWDER, LYOPHILIZED, FOR SOLUTION INTRAVENOUS at 14:23

## 2025-08-28 RX ADMIN — ATORVASTATIN CALCIUM 40 MG: 40 TABLET, FILM COATED ORAL at 20:42

## 2025-08-28 RX ADMIN — ASPIRIN 81 MG: 81 TABLET, CHEWABLE ORAL at 16:27

## 2025-08-28 RX ADMIN — MIRTAZAPINE 15 MG: 15 TABLET, FILM COATED ORAL at 20:42

## 2025-08-28 RX ADMIN — SODIUM CHLORIDE, PRESERVATIVE FREE 10 ML: 5 INJECTION INTRAVENOUS at 20:42

## 2025-08-28 RX ADMIN — HEPARIN SODIUM 5000 UNITS: 5000 INJECTION, SOLUTION INTRAVENOUS; SUBCUTANEOUS at 17:34

## 2025-08-28 RX ADMIN — FLUOXETINE HYDROCHLORIDE 10 MG: 10 CAPSULE ORAL at 17:34

## 2025-08-28 RX ADMIN — CLOPIDOGREL BISULFATE 75 MG: 75 TABLET, FILM COATED ORAL at 17:40

## 2025-08-28 RX ADMIN — Medication 2000 UNITS: at 17:33

## 2025-08-28 RX ADMIN — SUCRALFATE 1 G: 1 TABLET ORAL at 17:34

## 2025-08-28 RX ADMIN — SUCRALFATE 1 G: 1 TABLET ORAL at 20:42

## 2025-08-28 ASSESSMENT — PAIN SCALES - GENERAL
PAINLEVEL_OUTOF10: 0

## 2025-08-29 LAB
ALBUMIN SERPL-MCNC: 3 G/DL (ref 3.5–5.2)
ALBUMIN/GLOB SERPL: 1 (ref 1.1–2.2)
ALP SERPL-CCNC: 68 U/L (ref 40–129)
ALT SERPL-CCNC: 9 U/L (ref 10–50)
ANION GAP SERPL CALC-SCNC: 13 MMOL/L (ref 2–14)
AST SERPL-CCNC: 14 U/L (ref 10–50)
BASOPHILS # BLD: 0.03 K/UL (ref 0–0.1)
BASOPHILS NFR BLD: 0.5 % (ref 0–1)
BILIRUB SERPL-MCNC: 0.3 MG/DL (ref 0–1.2)
BUN SERPL-MCNC: 61 MG/DL (ref 6–20)
BUN/CREAT SERPL: 6 (ref 12–20)
CALCIUM SERPL-MCNC: 8.4 MG/DL (ref 8.6–10)
CHLORIDE SERPL-SCNC: 103 MMOL/L (ref 98–107)
CO2 SERPL-SCNC: 21 MMOL/L (ref 20–29)
CREAT SERPL-MCNC: 9.5 MG/DL (ref 0.7–1.2)
DIFFERENTIAL METHOD BLD: ABNORMAL
EOSINOPHIL # BLD: 0.16 K/UL (ref 0–0.4)
EOSINOPHIL NFR BLD: 2.5 % (ref 0–7)
ERYTHROCYTE [DISTWIDTH] IN BLOOD BY AUTOMATED COUNT: 12.5 % (ref 11.5–14.5)
EST. AVERAGE GLUCOSE BLD GHB EST-MCNC: 116 MG/DL
FERRITIN SERPL-MCNC: 727 NG/ML (ref 30–400)
FOLATE SERPL-MCNC: 3.8 NG/ML (ref 4.8–24.2)
GLOBULIN SER CALC-MCNC: 3.1 G/DL (ref 2–4)
GLUCOSE BLD STRIP.AUTO-MCNC: 100 MG/DL (ref 65–117)
GLUCOSE BLD STRIP.AUTO-MCNC: 125 MG/DL (ref 65–117)
GLUCOSE BLD STRIP.AUTO-MCNC: 135 MG/DL (ref 65–117)
GLUCOSE SERPL-MCNC: 116 MG/DL (ref 65–100)
HBA1C MFR BLD: 5.7 % (ref 4–5.6)
HBV SURFACE AB SERPL IA-ACNC: 6994 MIU/ML
HBV SURFACE AG SER QL: NONREACTIVE
HCT VFR BLD AUTO: 29.8 % (ref 36.6–50.3)
HGB BLD-MCNC: 9.5 G/DL (ref 12.1–17)
IMM GRANULOCYTES # BLD AUTO: 0.03 K/UL (ref 0–0.04)
IMM GRANULOCYTES NFR BLD AUTO: 0.5 % (ref 0–0.5)
IRON SATN MFR SERPL: 23 %
IRON SERPL-MCNC: 36 UG/DL (ref 40–157)
LYMPHOCYTES # BLD: 1.41 K/UL (ref 0.8–3.5)
LYMPHOCYTES NFR BLD: 22.1 % (ref 12–49)
MAGNESIUM SERPL-MCNC: 2.2 MG/DL (ref 1.6–2.6)
MCH RBC QN AUTO: 29.4 PG (ref 26–34)
MCHC RBC AUTO-ENTMCNC: 31.9 G/DL (ref 30–36.5)
MCV RBC AUTO: 92.3 FL (ref 80–99)
MONOCYTES # BLD: 0.69 K/UL (ref 0–1)
MONOCYTES NFR BLD: 10.8 % (ref 5–13)
NEUTS SEG # BLD: 4.05 K/UL (ref 1.8–8)
NEUTS SEG NFR BLD: 63.6 % (ref 32–75)
NRBC # BLD: 0 K/UL (ref 0–0.01)
NRBC BLD-RTO: 0 PER 100 WBC
PHOSPHATE SERPL-MCNC: 3.4 MG/DL (ref 2.5–4.5)
PLATELET # BLD AUTO: 217 K/UL (ref 150–400)
PMV BLD AUTO: 9.5 FL (ref 8.9–12.9)
POTASSIUM SERPL-SCNC: 5.3 MMOL/L (ref 3.5–5.1)
PROT SERPL-MCNC: 6.1 G/DL (ref 6.4–8.3)
RBC # BLD AUTO: 3.23 M/UL (ref 4.1–5.7)
SERVICE CMNT-IMP: ABNORMAL
SERVICE CMNT-IMP: ABNORMAL
SERVICE CMNT-IMP: NORMAL
SODIUM SERPL-SCNC: 136 MMOL/L (ref 136–145)
TIBC SERPL-MCNC: 154 UG/DL (ref 250–450)
TSH, 3RD GENERATION: 2.29 UIU/ML (ref 0.27–4.2)
UIBC SERPL-MCNC: 118 UG/DL (ref 112–347)
VIT B12 SERPL-MCNC: 551 PG/ML (ref 232–1245)
WBC # BLD AUTO: 6.4 K/UL (ref 4.1–11.1)

## 2025-08-29 PROCEDURE — 83735 ASSAY OF MAGNESIUM: CPT

## 2025-08-29 PROCEDURE — 80053 COMPREHEN METABOLIC PANEL: CPT

## 2025-08-29 PROCEDURE — 82728 ASSAY OF FERRITIN: CPT

## 2025-08-29 PROCEDURE — 85025 COMPLETE CBC W/AUTO DIFF WBC: CPT

## 2025-08-29 PROCEDURE — G0545 PR INHERENT VISIT TO INPT: HCPCS | Performed by: STUDENT IN AN ORGANIZED HEALTH CARE EDUCATION/TRAINING PROGRAM

## 2025-08-29 PROCEDURE — 86706 HEP B SURFACE ANTIBODY: CPT

## 2025-08-29 PROCEDURE — 82962 GLUCOSE BLOOD TEST: CPT

## 2025-08-29 PROCEDURE — 6370000000 HC RX 637 (ALT 250 FOR IP): Performed by: INTERNAL MEDICINE

## 2025-08-29 PROCEDURE — 83550 IRON BINDING TEST: CPT

## 2025-08-29 PROCEDURE — APPNB30 APP NON BILLABLE TIME 0-30 MINS

## 2025-08-29 PROCEDURE — 83540 ASSAY OF IRON: CPT

## 2025-08-29 PROCEDURE — 6360000002 HC RX W HCPCS: Performed by: INTERNAL MEDICINE

## 2025-08-29 PROCEDURE — 90935 HEMODIALYSIS ONE EVALUATION: CPT

## 2025-08-29 PROCEDURE — 1100000000 HC RM PRIVATE

## 2025-08-29 PROCEDURE — 82746 ASSAY OF FOLIC ACID SERUM: CPT

## 2025-08-29 PROCEDURE — 84443 ASSAY THYROID STIM HORMONE: CPT

## 2025-08-29 PROCEDURE — 99222 1ST HOSP IP/OBS MODERATE 55: CPT | Performed by: STUDENT IN AN ORGANIZED HEALTH CARE EDUCATION/TRAINING PROGRAM

## 2025-08-29 PROCEDURE — 6370000000 HC RX 637 (ALT 250 FOR IP): Performed by: NURSE PRACTITIONER

## 2025-08-29 PROCEDURE — 82607 VITAMIN B-12: CPT

## 2025-08-29 PROCEDURE — 83036 HEMOGLOBIN GLYCOSYLATED A1C: CPT

## 2025-08-29 PROCEDURE — 94760 N-INVAS EAR/PLS OXIMETRY 1: CPT

## 2025-08-29 PROCEDURE — 2580000003 HC RX 258: Performed by: INTERNAL MEDICINE

## 2025-08-29 PROCEDURE — 2500000003 HC RX 250 WO HCPCS: Performed by: INTERNAL MEDICINE

## 2025-08-29 PROCEDURE — 84100 ASSAY OF PHOSPHORUS: CPT

## 2025-08-29 PROCEDURE — 87340 HEPATITIS B SURFACE AG IA: CPT

## 2025-08-29 RX ORDER — CLONIDINE HYDROCHLORIDE 0.1 MG/1
0.1 TABLET ORAL EVERY 6 HOURS PRN
Status: DISCONTINUED | OUTPATIENT
Start: 2025-08-29 | End: 2025-09-05 | Stop reason: HOSPADM

## 2025-08-29 RX ADMIN — HEPARIN SODIUM 5000 UNITS: 5000 INJECTION, SOLUTION INTRAVENOUS; SUBCUTANEOUS at 00:30

## 2025-08-29 RX ADMIN — SODIUM CHLORIDE: 0.9 INJECTION, SOLUTION INTRAVENOUS at 02:48

## 2025-08-29 RX ADMIN — EPOETIN ALFA-EPBX 10000 UNITS: 10000 INJECTION, SOLUTION INTRAVENOUS; SUBCUTANEOUS at 19:16

## 2025-08-29 RX ADMIN — SUCRALFATE 1 G: 1 TABLET ORAL at 19:16

## 2025-08-29 RX ADMIN — FLUOXETINE HYDROCHLORIDE 10 MG: 10 CAPSULE ORAL at 08:54

## 2025-08-29 RX ADMIN — HEPARIN SODIUM 5000 UNITS: 5000 INJECTION, SOLUTION INTRAVENOUS; SUBCUTANEOUS at 07:31

## 2025-08-29 RX ADMIN — SODIUM CHLORIDE, PRESERVATIVE FREE 10 ML: 5 INJECTION INTRAVENOUS at 22:07

## 2025-08-29 RX ADMIN — PIPERACILLIN AND TAZOBACTAM 3375 MG: 3; .375 INJECTION, POWDER, LYOPHILIZED, FOR SOLUTION INTRAVENOUS at 02:50

## 2025-08-29 RX ADMIN — CLONIDINE HYDROCHLORIDE 0.1 MG: 0.1 TABLET ORAL at 03:35

## 2025-08-29 RX ADMIN — HEPARIN SODIUM 5000 UNITS: 5000 INJECTION, SOLUTION INTRAVENOUS; SUBCUTANEOUS at 18:04

## 2025-08-29 ASSESSMENT — PAIN SCALES - GENERAL
PAINLEVEL_OUTOF10: 0

## 2025-08-30 LAB
ANION GAP SERPL CALC-SCNC: 10 MMOL/L (ref 2–14)
BUN SERPL-MCNC: 31 MG/DL (ref 6–20)
BUN/CREAT SERPL: 5 (ref 12–20)
CALCIUM SERPL-MCNC: 8.3 MG/DL (ref 8.6–10)
CHLORIDE SERPL-SCNC: 103 MMOL/L (ref 98–107)
CO2 SERPL-SCNC: 27 MMOL/L (ref 20–29)
CREAT SERPL-MCNC: 6.23 MG/DL (ref 0.7–1.2)
GLUCOSE BLD STRIP.AUTO-MCNC: 126 MG/DL (ref 65–117)
GLUCOSE BLD STRIP.AUTO-MCNC: 188 MG/DL (ref 65–117)
GLUCOSE BLD STRIP.AUTO-MCNC: 208 MG/DL (ref 65–117)
GLUCOSE SERPL-MCNC: 162 MG/DL (ref 65–100)
HCT VFR BLD AUTO: 28.7 % (ref 36.6–50.3)
HGB BLD-MCNC: 9.5 G/DL (ref 12.1–17)
POTASSIUM SERPL-SCNC: 4.3 MMOL/L (ref 3.5–5.1)
SERVICE CMNT-IMP: ABNORMAL
SODIUM SERPL-SCNC: 140 MMOL/L (ref 136–145)

## 2025-08-30 PROCEDURE — 85014 HEMATOCRIT: CPT

## 2025-08-30 PROCEDURE — 6360000002 HC RX W HCPCS: Performed by: INTERNAL MEDICINE

## 2025-08-30 PROCEDURE — 80048 BASIC METABOLIC PNL TOTAL CA: CPT

## 2025-08-30 PROCEDURE — 82962 GLUCOSE BLOOD TEST: CPT

## 2025-08-30 PROCEDURE — 85018 HEMOGLOBIN: CPT

## 2025-08-30 PROCEDURE — 1100000000 HC RM PRIVATE

## 2025-08-30 RX ADMIN — HEPARIN SODIUM 5000 UNITS: 5000 INJECTION, SOLUTION INTRAVENOUS; SUBCUTANEOUS at 15:03

## 2025-08-30 RX ADMIN — HEPARIN SODIUM 5000 UNITS: 5000 INJECTION, SOLUTION INTRAVENOUS; SUBCUTANEOUS at 08:29

## 2025-08-30 RX ADMIN — HEPARIN SODIUM 5000 UNITS: 5000 INJECTION, SOLUTION INTRAVENOUS; SUBCUTANEOUS at 00:02

## 2025-08-30 ASSESSMENT — PAIN SCALES - GENERAL
PAINLEVEL_OUTOF10: 0
PAINLEVEL_OUTOF10: 5
PAINLEVEL_OUTOF10: 0

## 2025-08-30 ASSESSMENT — PAIN DESCRIPTION - PAIN TYPE: TYPE: SURGICAL PAIN

## 2025-08-30 ASSESSMENT — PAIN DESCRIPTION - LOCATION: LOCATION: ABDOMEN

## 2025-08-31 LAB
GLUCOSE BLD STRIP.AUTO-MCNC: 139 MG/DL (ref 65–117)
GLUCOSE BLD STRIP.AUTO-MCNC: 149 MG/DL (ref 65–117)
GLUCOSE BLD STRIP.AUTO-MCNC: 166 MG/DL (ref 65–117)
GLUCOSE BLD STRIP.AUTO-MCNC: 194 MG/DL (ref 65–117)
SERVICE CMNT-IMP: ABNORMAL

## 2025-08-31 PROCEDURE — 1100000000 HC RM PRIVATE

## 2025-08-31 PROCEDURE — 2500000003 HC RX 250 WO HCPCS: Performed by: INTERNAL MEDICINE

## 2025-08-31 PROCEDURE — 82962 GLUCOSE BLOOD TEST: CPT

## 2025-08-31 RX ADMIN — SODIUM CHLORIDE, PRESERVATIVE FREE 10 ML: 5 INJECTION INTRAVENOUS at 21:02

## 2025-08-31 ASSESSMENT — PAIN SCALES - GENERAL
PAINLEVEL_OUTOF10: 0

## 2025-09-01 ENCOUNTER — ANESTHESIA (OUTPATIENT)
Facility: HOSPITAL | Age: 60
End: 2025-09-01
Payer: MEDICARE

## 2025-09-01 ENCOUNTER — ANESTHESIA EVENT (OUTPATIENT)
Facility: HOSPITAL | Age: 60
End: 2025-09-01
Payer: MEDICARE

## 2025-09-01 LAB
ANION GAP SERPL CALC-SCNC: 12 MMOL/L (ref 2–14)
BUN SERPL-MCNC: 45 MG/DL (ref 6–20)
BUN/CREAT SERPL: 5 (ref 12–20)
CALCIUM SERPL-MCNC: 8.2 MG/DL (ref 8.6–10)
CHLORIDE SERPL-SCNC: 101 MMOL/L (ref 98–107)
CO2 SERPL-SCNC: 26 MMOL/L (ref 20–29)
CREAT SERPL-MCNC: 8.65 MG/DL (ref 0.7–1.2)
GLUCOSE BLD STRIP.AUTO-MCNC: 115 MG/DL (ref 65–117)
GLUCOSE BLD STRIP.AUTO-MCNC: 119 MG/DL (ref 65–117)
GLUCOSE BLD STRIP.AUTO-MCNC: 174 MG/DL (ref 65–117)
GLUCOSE BLD STRIP.AUTO-MCNC: 179 MG/DL (ref 65–117)
GLUCOSE SERPL-MCNC: 162 MG/DL (ref 65–100)
HCT VFR BLD AUTO: 28 % (ref 36.6–50.3)
HGB BLD-MCNC: 9 G/DL (ref 12.1–17)
POTASSIUM SERPL-SCNC: 4.4 MMOL/L (ref 3.5–5.1)
SERVICE CMNT-IMP: ABNORMAL
SERVICE CMNT-IMP: NORMAL
SODIUM SERPL-SCNC: 139 MMOL/L (ref 136–145)

## 2025-09-01 PROCEDURE — 87205 SMEAR GRAM STAIN: CPT

## 2025-09-01 PROCEDURE — 87070 CULTURE OTHR SPECIMN AEROBIC: CPT

## 2025-09-01 PROCEDURE — 2500000003 HC RX 250 WO HCPCS: Performed by: NURSE PRACTITIONER

## 2025-09-01 PROCEDURE — 85018 HEMOGLOBIN: CPT

## 2025-09-01 PROCEDURE — 87075 CULTR BACTERIA EXCEPT BLOOD: CPT

## 2025-09-01 PROCEDURE — 6360000002 HC RX W HCPCS: Performed by: NURSE ANESTHETIST, CERTIFIED REGISTERED

## 2025-09-01 PROCEDURE — 2720000010 HC SURG SUPPLY STERILE: Performed by: PODIATRIST

## 2025-09-01 PROCEDURE — 6360000002 HC RX W HCPCS: Performed by: PODIATRIST

## 2025-09-01 PROCEDURE — 3700000001 HC ADD 15 MINUTES (ANESTHESIA): Performed by: PODIATRIST

## 2025-09-01 PROCEDURE — 6360000002 HC RX W HCPCS: Performed by: NURSE PRACTITIONER

## 2025-09-01 PROCEDURE — 3700000000 HC ANESTHESIA ATTENDED CARE: Performed by: PODIATRIST

## 2025-09-01 PROCEDURE — 85014 HEMATOCRIT: CPT

## 2025-09-01 PROCEDURE — 90935 HEMODIALYSIS ONE EVALUATION: CPT

## 2025-09-01 PROCEDURE — 7100000001 HC PACU RECOVERY - ADDTL 15 MIN: Performed by: PODIATRIST

## 2025-09-01 PROCEDURE — 2580000003 HC RX 258

## 2025-09-01 PROCEDURE — 80048 BASIC METABOLIC PNL TOTAL CA: CPT

## 2025-09-01 PROCEDURE — 7100000000 HC PACU RECOVERY - FIRST 15 MIN: Performed by: PODIATRIST

## 2025-09-01 PROCEDURE — 6360000002 HC RX W HCPCS

## 2025-09-01 PROCEDURE — 88305 TISSUE EXAM BY PATHOLOGIST: CPT

## 2025-09-01 PROCEDURE — 88304 TISSUE EXAM BY PATHOLOGIST: CPT

## 2025-09-01 PROCEDURE — 2500000003 HC RX 250 WO HCPCS: Performed by: NURSE ANESTHETIST, CERTIFIED REGISTERED

## 2025-09-01 PROCEDURE — 3600000013 HC SURGERY LEVEL 3 ADDTL 15MIN: Performed by: PODIATRIST

## 2025-09-01 PROCEDURE — 1100000000 HC RM PRIVATE

## 2025-09-01 PROCEDURE — 3600000003 HC SURGERY LEVEL 3 BASE: Performed by: PODIATRIST

## 2025-09-01 PROCEDURE — 2580000003 HC RX 258: Performed by: ANESTHESIOLOGY

## 2025-09-01 PROCEDURE — 2709999900 HC NON-CHARGEABLE SUPPLY: Performed by: PODIATRIST

## 2025-09-01 PROCEDURE — 82962 GLUCOSE BLOOD TEST: CPT

## 2025-09-01 PROCEDURE — 88311 DECALCIFY TISSUE: CPT

## 2025-09-01 PROCEDURE — 2500000003 HC RX 250 WO HCPCS: Performed by: ANESTHESIOLOGY

## 2025-09-01 PROCEDURE — 2500000003 HC RX 250 WO HCPCS: Performed by: INTERNAL MEDICINE

## 2025-09-01 PROCEDURE — 2580000003 HC RX 258: Performed by: NURSE ANESTHETIST, CERTIFIED REGISTERED

## 2025-09-01 RX ORDER — FENTANYL CITRATE 50 UG/ML
50 INJECTION, SOLUTION INTRAMUSCULAR; INTRAVENOUS EVERY 5 MIN PRN
Status: DISCONTINUED | OUTPATIENT
Start: 2025-09-01 | End: 2025-09-01 | Stop reason: HOSPADM

## 2025-09-01 RX ORDER — HYDRALAZINE HYDROCHLORIDE 20 MG/ML
10 INJECTION INTRAMUSCULAR; INTRAVENOUS
Status: DISCONTINUED | OUTPATIENT
Start: 2025-09-01 | End: 2025-09-01 | Stop reason: HOSPADM

## 2025-09-01 RX ORDER — MIDAZOLAM HYDROCHLORIDE 2 MG/2ML
2 INJECTION, SOLUTION INTRAMUSCULAR; INTRAVENOUS
Status: DISCONTINUED | OUTPATIENT
Start: 2025-09-01 | End: 2025-09-01 | Stop reason: HOSPADM

## 2025-09-01 RX ORDER — SODIUM CHLORIDE 9 MG/ML
INJECTION, SOLUTION INTRAVENOUS CONTINUOUS
Status: DISCONTINUED | OUTPATIENT
Start: 2025-09-01 | End: 2025-09-01 | Stop reason: HOSPADM

## 2025-09-01 RX ORDER — SODIUM CHLORIDE, SODIUM LACTATE, POTASSIUM CHLORIDE, CALCIUM CHLORIDE 600; 310; 30; 20 MG/100ML; MG/100ML; MG/100ML; MG/100ML
INJECTION, SOLUTION INTRAVENOUS CONTINUOUS
Status: DISCONTINUED | OUTPATIENT
Start: 2025-09-01 | End: 2025-09-01 | Stop reason: HOSPADM

## 2025-09-01 RX ORDER — PHENYLEPHRINE HCL IN 0.9% NACL 0.4MG/10ML
SYRINGE (ML) INTRAVENOUS
Status: DISCONTINUED | OUTPATIENT
Start: 2025-09-01 | End: 2025-09-01 | Stop reason: SDUPTHER

## 2025-09-01 RX ORDER — ONDANSETRON 2 MG/ML
4 INJECTION INTRAMUSCULAR; INTRAVENOUS
Status: DISCONTINUED | OUTPATIENT
Start: 2025-09-01 | End: 2025-09-01 | Stop reason: HOSPADM

## 2025-09-01 RX ORDER — HYDRALAZINE HYDROCHLORIDE 20 MG/ML
INJECTION INTRAMUSCULAR; INTRAVENOUS
Status: COMPLETED
Start: 2025-09-01 | End: 2025-09-01

## 2025-09-01 RX ORDER — ONDANSETRON 2 MG/ML
INJECTION INTRAMUSCULAR; INTRAVENOUS
Status: DISCONTINUED | OUTPATIENT
Start: 2025-09-01 | End: 2025-09-01 | Stop reason: SDUPTHER

## 2025-09-01 RX ORDER — VANCOMYCIN HYDROCHLORIDE 1 G/20ML
INJECTION, POWDER, LYOPHILIZED, FOR SOLUTION INTRAVENOUS PRN
Status: DISCONTINUED | OUTPATIENT
Start: 2025-09-01 | End: 2025-09-01 | Stop reason: HOSPADM

## 2025-09-01 RX ORDER — SODIUM CHLORIDE 9 MG/ML
INJECTION, SOLUTION INTRAVENOUS PRN
Status: DISCONTINUED | OUTPATIENT
Start: 2025-09-01 | End: 2025-09-01 | Stop reason: HOSPADM

## 2025-09-01 RX ORDER — LIDOCAINE HYDROCHLORIDE 20 MG/ML
INJECTION, SOLUTION EPIDURAL; INFILTRATION; INTRACAUDAL; PERINEURAL
Status: DISCONTINUED | OUTPATIENT
Start: 2025-09-01 | End: 2025-09-01 | Stop reason: SDUPTHER

## 2025-09-01 RX ORDER — EPHEDRINE SULFATE 50 MG/ML
INJECTION INTRAVENOUS
Status: DISCONTINUED | OUTPATIENT
Start: 2025-09-01 | End: 2025-09-01 | Stop reason: SDUPTHER

## 2025-09-01 RX ORDER — SODIUM CHLORIDE 0.9 % (FLUSH) 0.9 %
5-40 SYRINGE (ML) INJECTION PRN
Status: DISCONTINUED | OUTPATIENT
Start: 2025-09-01 | End: 2025-09-01 | Stop reason: HOSPADM

## 2025-09-01 RX ORDER — PROPOFOL 10 MG/ML
INJECTION, EMULSION INTRAVENOUS
Status: DISCONTINUED | OUTPATIENT
Start: 2025-09-01 | End: 2025-09-01 | Stop reason: SDUPTHER

## 2025-09-01 RX ORDER — FENTANYL CITRATE 50 UG/ML
100 INJECTION, SOLUTION INTRAMUSCULAR; INTRAVENOUS
Status: DISCONTINUED | OUTPATIENT
Start: 2025-09-01 | End: 2025-09-01 | Stop reason: HOSPADM

## 2025-09-01 RX ORDER — SODIUM CHLORIDE 0.9 % (FLUSH) 0.9 %
5-40 SYRINGE (ML) INJECTION EVERY 12 HOURS SCHEDULED
Status: DISCONTINUED | OUTPATIENT
Start: 2025-09-01 | End: 2025-09-01 | Stop reason: HOSPADM

## 2025-09-01 RX ORDER — ONDANSETRON 2 MG/ML
4 INJECTION INTRAMUSCULAR; INTRAVENOUS ONCE
Status: DISCONTINUED | OUTPATIENT
Start: 2025-09-01 | End: 2025-09-01 | Stop reason: HOSPADM

## 2025-09-01 RX ORDER — HYDROMORPHONE HYDROCHLORIDE 1 MG/ML
0.5 INJECTION, SOLUTION INTRAMUSCULAR; INTRAVENOUS; SUBCUTANEOUS EVERY 5 MIN PRN
Status: DISCONTINUED | OUTPATIENT
Start: 2025-09-01 | End: 2025-09-01 | Stop reason: HOSPADM

## 2025-09-01 RX ORDER — MIDAZOLAM HYDROCHLORIDE 5 MG/5ML
5 INJECTION, SOLUTION INTRAMUSCULAR; INTRAVENOUS
Status: DISCONTINUED | OUTPATIENT
Start: 2025-09-01 | End: 2025-09-01 | Stop reason: HOSPADM

## 2025-09-01 RX ORDER — MIDAZOLAM HYDROCHLORIDE 1 MG/ML
INJECTION, SOLUTION INTRAMUSCULAR; INTRAVENOUS
Status: DISCONTINUED | OUTPATIENT
Start: 2025-09-01 | End: 2025-09-01 | Stop reason: SDUPTHER

## 2025-09-01 RX ORDER — DIPHENHYDRAMINE HYDROCHLORIDE 50 MG/ML
12.5 INJECTION, SOLUTION INTRAMUSCULAR; INTRAVENOUS
Status: DISCONTINUED | OUTPATIENT
Start: 2025-09-01 | End: 2025-09-01 | Stop reason: HOSPADM

## 2025-09-01 RX ORDER — FENTANYL CITRATE 50 UG/ML
INJECTION, SOLUTION INTRAMUSCULAR; INTRAVENOUS
Status: DISCONTINUED | OUTPATIENT
Start: 2025-09-01 | End: 2025-09-01 | Stop reason: SDUPTHER

## 2025-09-01 RX ORDER — SODIUM CHLORIDE 9 MG/ML
INJECTION, SOLUTION INTRAVENOUS
Status: DISCONTINUED | OUTPATIENT
Start: 2025-09-01 | End: 2025-09-01 | Stop reason: SDUPTHER

## 2025-09-01 RX ORDER — PROCHLORPERAZINE EDISYLATE 5 MG/ML
5 INJECTION INTRAMUSCULAR; INTRAVENOUS
Status: DISCONTINUED | OUTPATIENT
Start: 2025-09-01 | End: 2025-09-01 | Stop reason: HOSPADM

## 2025-09-01 RX ADMIN — LIDOCAINE HYDROCHLORIDE 100 MG: 20 INJECTION, SOLUTION EPIDURAL; INFILTRATION; INTRACAUDAL; PERINEURAL at 08:27

## 2025-09-01 RX ADMIN — SODIUM CHLORIDE, PRESERVATIVE FREE 10 ML: 5 INJECTION INTRAVENOUS at 20:18

## 2025-09-01 RX ADMIN — Medication 80 MCG: at 08:53

## 2025-09-01 RX ADMIN — PROPOFOL 200 MG: 10 INJECTION, EMULSION INTRAVENOUS at 08:27

## 2025-09-01 RX ADMIN — Medication 80 MCG: at 08:45

## 2025-09-01 RX ADMIN — SODIUM CHLORIDE: 0.9 INJECTION, SOLUTION INTRAVENOUS at 11:35

## 2025-09-01 RX ADMIN — Medication 80 MCG: at 09:24

## 2025-09-01 RX ADMIN — HYDRALAZINE HYDROCHLORIDE 10 MG: 20 INJECTION INTRAMUSCULAR; INTRAVENOUS at 10:07

## 2025-09-01 RX ADMIN — SODIUM CHLORIDE: 9 INJECTION, SOLUTION INTRAVENOUS at 08:18

## 2025-09-01 RX ADMIN — Medication 80 MCG: at 08:57

## 2025-09-01 RX ADMIN — WATER 2000 MG: 1 INJECTION INTRAMUSCULAR; INTRAVENOUS; SUBCUTANEOUS at 17:33

## 2025-09-01 RX ADMIN — SODIUM CHLORIDE, PRESERVATIVE FREE 10 ML: 5 INJECTION INTRAVENOUS at 11:36

## 2025-09-01 RX ADMIN — Medication 80 MCG: at 09:28

## 2025-09-01 RX ADMIN — ONDANSETRON 4 MG: 2 INJECTION, SOLUTION INTRAMUSCULAR; INTRAVENOUS at 08:31

## 2025-09-01 RX ADMIN — EPHEDRINE SULFATE 10 MG: 50 INJECTION INTRAVENOUS at 08:59

## 2025-09-01 RX ADMIN — VANCOMYCIN HYDROCHLORIDE 500 MG: 500 INJECTION, POWDER, LYOPHILIZED, FOR SOLUTION INTRAVENOUS at 20:12

## 2025-09-01 RX ADMIN — MIDAZOLAM HYDROCHLORIDE 2 MG: 1 INJECTION, SOLUTION INTRAMUSCULAR; INTRAVENOUS at 08:23

## 2025-09-01 RX ADMIN — Medication 80 MCG: at 08:49

## 2025-09-01 RX ADMIN — FENTANYL CITRATE 25 MCG: 50 INJECTION INTRAMUSCULAR; INTRAVENOUS at 08:41

## 2025-09-01 ASSESSMENT — PAIN SCALES - GENERAL
PAINLEVEL_OUTOF10: 0
PAINLEVEL_OUTOF10: 0

## 2025-09-02 ENCOUNTER — APPOINTMENT (OUTPATIENT)
Facility: HOSPITAL | Age: 60
End: 2025-09-02
Payer: MEDICARE

## 2025-09-02 LAB
BACTERIA SPEC CULT: NORMAL
BACTERIA SPEC CULT: NORMAL
GLUCOSE BLD STRIP.AUTO-MCNC: 111 MG/DL (ref 65–117)
GLUCOSE BLD STRIP.AUTO-MCNC: 147 MG/DL (ref 65–117)
GLUCOSE BLD STRIP.AUTO-MCNC: 199 MG/DL (ref 65–117)
GLUCOSE BLD STRIP.AUTO-MCNC: 222 MG/DL (ref 65–117)
SERVICE CMNT-IMP: ABNORMAL
SERVICE CMNT-IMP: NORMAL

## 2025-09-02 PROCEDURE — 82962 GLUCOSE BLOOD TEST: CPT

## 2025-09-02 PROCEDURE — 97165 OT EVAL LOW COMPLEX 30 MIN: CPT

## 2025-09-02 PROCEDURE — 1100000000 HC RM PRIVATE

## 2025-09-02 PROCEDURE — 93925 LOWER EXTREMITY STUDY: CPT

## 2025-09-02 PROCEDURE — 2500000003 HC RX 250 WO HCPCS: Performed by: INTERNAL MEDICINE

## 2025-09-02 PROCEDURE — 6370000000 HC RX 637 (ALT 250 FOR IP): Performed by: INTERNAL MEDICINE

## 2025-09-02 PROCEDURE — G0545 PR INHERENT VISIT TO INPT: HCPCS | Performed by: STUDENT IN AN ORGANIZED HEALTH CARE EDUCATION/TRAINING PROGRAM

## 2025-09-02 PROCEDURE — 97535 SELF CARE MNGMENT TRAINING: CPT

## 2025-09-02 PROCEDURE — 97161 PT EVAL LOW COMPLEX 20 MIN: CPT

## 2025-09-02 PROCEDURE — 99232 SBSQ HOSP IP/OBS MODERATE 35: CPT | Performed by: STUDENT IN AN ORGANIZED HEALTH CARE EDUCATION/TRAINING PROGRAM

## 2025-09-02 PROCEDURE — 97530 THERAPEUTIC ACTIVITIES: CPT

## 2025-09-02 RX ADMIN — SODIUM CHLORIDE, PRESERVATIVE FREE 5 ML: 5 INJECTION INTRAVENOUS at 20:08

## 2025-09-02 RX ADMIN — INSULIN LISPRO 2 UNITS: 100 INJECTION, SOLUTION INTRAVENOUS; SUBCUTANEOUS at 06:06

## 2025-09-02 ASSESSMENT — PAIN SCALES - GENERAL
PAINLEVEL_OUTOF10: 0

## 2025-09-03 ENCOUNTER — APPOINTMENT (OUTPATIENT)
Facility: HOSPITAL | Age: 60
End: 2025-09-03
Payer: MEDICARE

## 2025-09-03 LAB
ANION GAP SERPL CALC-SCNC: 11 MMOL/L (ref 2–14)
BACTERIA SPEC CULT: NORMAL
BACTERIA SPEC CULT: NORMAL
BASOPHILS # BLD: 0.04 K/UL (ref 0–0.1)
BASOPHILS NFR BLD: 0.5 % (ref 0–1)
BUN SERPL-MCNC: 35 MG/DL (ref 6–20)
BUN/CREAT SERPL: 5 (ref 12–20)
CALCIUM SERPL-MCNC: 8 MG/DL (ref 8.6–10)
CHLORIDE SERPL-SCNC: 103 MMOL/L (ref 98–107)
CO2 SERPL-SCNC: 27 MMOL/L (ref 20–29)
CREAT SERPL-MCNC: 7.18 MG/DL (ref 0.7–1.2)
DIFFERENTIAL METHOD BLD: ABNORMAL
ECHO BSA: 1.93 M2
EOSINOPHIL # BLD: 0.08 K/UL (ref 0–0.4)
EOSINOPHIL NFR BLD: 1 % (ref 0–7)
ERYTHROCYTE [DISTWIDTH] IN BLOOD BY AUTOMATED COUNT: 13.2 % (ref 11.5–14.5)
GLUCOSE BLD STRIP.AUTO-MCNC: 112 MG/DL (ref 65–117)
GLUCOSE BLD STRIP.AUTO-MCNC: 114 MG/DL (ref 65–117)
GLUCOSE BLD STRIP.AUTO-MCNC: 185 MG/DL (ref 65–117)
GLUCOSE BLD STRIP.AUTO-MCNC: 209 MG/DL (ref 65–117)
GLUCOSE SERPL-MCNC: 175 MG/DL (ref 65–100)
GRAM STN SPEC: NORMAL
HCT VFR BLD AUTO: 29.2 % (ref 36.6–50.3)
HGB BLD-MCNC: 9.2 G/DL (ref 12.1–17)
IMM GRANULOCYTES # BLD AUTO: 0.02 K/UL (ref 0–0.04)
IMM GRANULOCYTES NFR BLD AUTO: 0.3 % (ref 0–0.5)
LYMPHOCYTES # BLD: 1.57 K/UL (ref 0.8–3.5)
LYMPHOCYTES NFR BLD: 19.8 % (ref 12–49)
MCH RBC QN AUTO: 29.7 PG (ref 26–34)
MCHC RBC AUTO-ENTMCNC: 31.5 G/DL (ref 30–36.5)
MCV RBC AUTO: 94.2 FL (ref 80–99)
MONOCYTES # BLD: 0.59 K/UL (ref 0–1)
MONOCYTES NFR BLD: 7.4 % (ref 5–13)
NEUTS SEG # BLD: 5.63 K/UL (ref 1.8–8)
NEUTS SEG NFR BLD: 71 % (ref 32–75)
NRBC # BLD: 0 K/UL (ref 0–0.01)
NRBC BLD-RTO: 0 PER 100 WBC
PLATELET # BLD AUTO: 255 K/UL (ref 150–400)
PMV BLD AUTO: 9.2 FL (ref 8.9–12.9)
POTASSIUM SERPL-SCNC: 4.2 MMOL/L (ref 3.5–5.1)
RBC # BLD AUTO: 3.1 M/UL (ref 4.1–5.7)
SERVICE CMNT-IMP: ABNORMAL
SERVICE CMNT-IMP: ABNORMAL
SERVICE CMNT-IMP: NORMAL
SODIUM SERPL-SCNC: 141 MMOL/L (ref 136–145)
VANCOMYCIN SERPL-MCNC: 11 UG/ML
VAS LEFT ATA DIST PSV: 66.2 CM/S
VAS LEFT ATA PROX PSV: 119.1 CM/S
VAS LEFT CFA PROX PSV: 88.7 CM/S
VAS LEFT PERONEAL DIST PSV: 61.8 CM/S
VAS LEFT PFA PROX PSV: 64.2 CM/S
VAS LEFT POP A DIST PSV: 104.5 CM/S
VAS LEFT POP A PROX PSV: 113.7 CM/S
VAS LEFT POP A PROX VEL RATIO: 1.05
VAS LEFT PTA DIST PSV: 354.2 CM/S
VAS LEFT PTA PROX PSV: 132 CM/S
VAS LEFT SFA DIST PSV: 108.2 CM/S
VAS LEFT SFA DIST VEL RATIO: 1.33
VAS LEFT SFA MID PSV: 81.3 CM/S
VAS LEFT SFA MID VEL RATIO: 1.01
VAS LEFT SFA PROX PSV: 80.1 CM/S
VAS LEFT SFA PROX VEL RATIO: 0.9
VAS RIGHT ATA DIST PSV: 21.2 CM/S
VAS RIGHT ATA PROX PSV: 41.2 CM/S
VAS RIGHT CFA PROX PSV: 72.8 CM/S
VAS RIGHT PERONEAL DIST PSV: 42 CM/S
VAS RIGHT PFA PROX PSV: 57.4 CM/S
VAS RIGHT POP A DIST PSV: 77.2 CM/S
VAS RIGHT POP A PROX PSV: 46.5 CM/S
VAS RIGHT POP A PROX VEL RATIO: 0.44
VAS RIGHT PTA DIST PSV: 61.8 CM/S
VAS RIGHT PTA PROX PSV: 67.3 CM/S
VAS RIGHT SFA DIST PSV: 106.4 CM/S
VAS RIGHT SFA DIST VEL RATIO: 0.83
VAS RIGHT SFA MID PSV: 128.3 CM/S
VAS RIGHT SFA MID VEL RATIO: 1.7
VAS RIGHT SFA PROX PSV: 76.1 CM/S
VAS RIGHT SFA PROX VEL RATIO: 1
WBC # BLD AUTO: 7.9 K/UL (ref 4.1–11.1)

## 2025-09-03 PROCEDURE — 80048 BASIC METABOLIC PNL TOTAL CA: CPT

## 2025-09-03 PROCEDURE — 6360000002 HC RX W HCPCS: Performed by: NURSE PRACTITIONER

## 2025-09-03 PROCEDURE — 2580000003 HC RX 258: Performed by: NURSE PRACTITIONER

## 2025-09-03 PROCEDURE — 82962 GLUCOSE BLOOD TEST: CPT

## 2025-09-03 PROCEDURE — 80202 ASSAY OF VANCOMYCIN: CPT

## 2025-09-03 PROCEDURE — 85025 COMPLETE CBC W/AUTO DIFF WBC: CPT

## 2025-09-03 PROCEDURE — 1100000000 HC RM PRIVATE

## 2025-09-03 PROCEDURE — 73630 X-RAY EXAM OF FOOT: CPT

## 2025-09-03 RX ADMIN — VANCOMYCIN HYDROCHLORIDE 750 MG: 750 INJECTION, POWDER, LYOPHILIZED, FOR SOLUTION INTRAVENOUS at 12:17

## 2025-09-03 ASSESSMENT — PAIN SCALES - GENERAL
PAINLEVEL_OUTOF10: 0

## 2025-09-04 LAB
GLUCOSE BLD STRIP.AUTO-MCNC: 122 MG/DL (ref 65–117)
GLUCOSE BLD STRIP.AUTO-MCNC: 197 MG/DL (ref 65–117)
SERVICE CMNT-IMP: ABNORMAL
SERVICE CMNT-IMP: ABNORMAL

## 2025-09-04 PROCEDURE — 2500000003 HC RX 250 WO HCPCS: Performed by: INTERNAL MEDICINE

## 2025-09-04 PROCEDURE — 1100000000 HC RM PRIVATE

## 2025-09-04 PROCEDURE — G0545 PR INHERENT VISIT TO INPT: HCPCS | Performed by: STUDENT IN AN ORGANIZED HEALTH CARE EDUCATION/TRAINING PROGRAM

## 2025-09-04 PROCEDURE — 6370000000 HC RX 637 (ALT 250 FOR IP): Performed by: NURSE PRACTITIONER

## 2025-09-04 PROCEDURE — 94760 N-INVAS EAR/PLS OXIMETRY 1: CPT

## 2025-09-04 PROCEDURE — 99232 SBSQ HOSP IP/OBS MODERATE 35: CPT | Performed by: STUDENT IN AN ORGANIZED HEALTH CARE EDUCATION/TRAINING PROGRAM

## 2025-09-04 PROCEDURE — 82962 GLUCOSE BLOOD TEST: CPT

## 2025-09-04 RX ADMIN — CLONIDINE HYDROCHLORIDE 0.1 MG: 0.1 TABLET ORAL at 20:22

## 2025-09-04 RX ADMIN — SODIUM CHLORIDE, PRESERVATIVE FREE 10 ML: 5 INJECTION INTRAVENOUS at 20:26

## 2025-09-04 RX ADMIN — SODIUM CHLORIDE, PRESERVATIVE FREE 5 ML: 5 INJECTION INTRAVENOUS at 08:29

## 2025-09-04 ASSESSMENT — PAIN SCALES - GENERAL: PAINLEVEL_OUTOF10: 0

## 2025-09-05 VITALS
RESPIRATION RATE: 17 BRPM | HEART RATE: 79 BPM | HEIGHT: 74 IN | BODY MASS INDEX: 21.16 KG/M2 | OXYGEN SATURATION: 100 % | TEMPERATURE: 97.7 F | WEIGHT: 164.9 LBS | DIASTOLIC BLOOD PRESSURE: 81 MMHG | SYSTOLIC BLOOD PRESSURE: 157 MMHG

## 2025-09-05 LAB
GLUCOSE BLD STRIP.AUTO-MCNC: 143 MG/DL (ref 65–117)
GLUCOSE BLD STRIP.AUTO-MCNC: 150 MG/DL (ref 65–117)
GLUCOSE BLD STRIP.AUTO-MCNC: 168 MG/DL (ref 65–117)
SERVICE CMNT-IMP: ABNORMAL

## 2025-09-05 PROCEDURE — 97116 GAIT TRAINING THERAPY: CPT

## 2025-09-05 PROCEDURE — 94760 N-INVAS EAR/PLS OXIMETRY 1: CPT

## 2025-09-05 PROCEDURE — 82962 GLUCOSE BLOOD TEST: CPT

## 2025-09-05 PROCEDURE — G0545 PR INHERENT VISIT TO INPT: HCPCS | Performed by: STUDENT IN AN ORGANIZED HEALTH CARE EDUCATION/TRAINING PROGRAM

## 2025-09-05 PROCEDURE — 99231 SBSQ HOSP IP/OBS SF/LOW 25: CPT | Performed by: STUDENT IN AN ORGANIZED HEALTH CARE EDUCATION/TRAINING PROGRAM

## 2025-09-05 PROCEDURE — 90935 HEMODIALYSIS ONE EVALUATION: CPT

## 2025-09-05 ASSESSMENT — PAIN SCALES - GENERAL
PAINLEVEL_OUTOF10: 0

## (undated) DEVICE — STOCKINETTE ORTH W12XL48IN COT 2 PLY HLLW FOR HANDLING LMB

## (undated) DEVICE — DRESSING PETRO W5XL9IN 3% BISMUTH TRIBROMOPHENATE XRFRM

## (undated) DEVICE — PENCIL SMK EVAC 10 FT BLADE ELECTRD ROCKER FOR TELSCP

## (undated) DEVICE — SPONGE GZ KERLIX W4.5INXL4.1YD COT 6 PLY OPN WV STRETCHABLE

## (undated) DEVICE — GLOVE SURG SZ 65 THK91MIL LTX FREE SYN POLYISOPRENE

## (undated) DEVICE — SUTURE ETHILON 2-0 L150CM NONABSORBABLE BLK TP-1 L65MM 1/2 825G

## (undated) DEVICE — GLOVE SURG SZ 7 L12IN FNGR THK79MIL GRN LTX FREE

## (undated) DEVICE — SUTURE ETHILON SZ 3-0 L18IN NONABSORBABLE BLK L24MM PS-1 3/8 1663G

## (undated) DEVICE — BANDAGE 4 IN ESMARCH COMPR W4INXL9FT E SMOOTH FINISH

## (undated) DEVICE — PADDING UNDERCAST W3INXL12FT RAYON POLY SYN NONADHESIVE

## (undated) DEVICE — SUTURE NONABSORBABLE MONOFILAMENT 3-0 PS-1 18 IN BLK ETHILON 1663H

## (undated) DEVICE — SOLUTION SURG PREP 26 CC PURPREP

## (undated) DEVICE — SUTURE VICRYL 2-0 L27IN ABSRB UD PS-2 L19MM 1/2 CIR J428H

## (undated) DEVICE — TOWEL SURG W17XL27IN BLU COT STD PREWASHED DELINTED 4 PER STRL PK

## (undated) DEVICE — Device

## (undated) DEVICE — TOURNIQUET RED 18 X 4 DISP

## (undated) DEVICE — BLADE SURG 10 TWIN BK CARBON STL STRL CISION LF DISP

## (undated) DEVICE — GOWN SURG XL L47IN BLU FABRICREINFORCED SET IN SL HK LOOP

## (undated) DEVICE — BLADE SURG 15 TWIN BK CARBON STL STRL CISION LF DISP

## (undated) DEVICE — SOLUTION IRRIG 1000ML 09% SOD CHL USP PIC PLAS CONTAINER

## (undated) DEVICE — SUTURE VICRYL + SZ 3-0 L27IN ABSRB UD L26MM SH 1/2 CIR VCP416H

## (undated) DEVICE — SUTURE ETHILON SZ 2-0 L18IN NONABSORBABLE BLK L26MM FS 3/8 664G

## (undated) DEVICE — GUARD PIN DIA0.045IN WHT REM SL FOR K WIRE STNMN MINIMUM ORDER 2 BX

## (undated) DEVICE — DRESSING PETRO W3XL3IN OIL EMUL N ADH GZ KNIT IMPREG CELOS

## (undated) DEVICE — SPONGE GZ W4XL4IN COT 12 PLY TYP VII WVN C FLD DSGN STERILE

## (undated) DEVICE — NEEDLE SAFETY MONOJECT 25GX1-1/2